# Patient Record
Sex: MALE | Race: BLACK OR AFRICAN AMERICAN | NOT HISPANIC OR LATINO | URBAN - METROPOLITAN AREA
[De-identification: names, ages, dates, MRNs, and addresses within clinical notes are randomized per-mention and may not be internally consistent; named-entity substitution may affect disease eponyms.]

---

## 2022-05-27 PROBLEM — Z00.00 ENCOUNTER FOR PREVENTIVE HEALTH EXAMINATION: Status: ACTIVE | Noted: 2022-05-27

## 2022-10-30 ENCOUNTER — EMERGENCY (EMERGENCY)
Facility: HOSPITAL | Age: 57
LOS: 0 days | Discharge: HOME | End: 2022-10-30
Attending: STUDENT IN AN ORGANIZED HEALTH CARE EDUCATION/TRAINING PROGRAM | Admitting: STUDENT IN AN ORGANIZED HEALTH CARE EDUCATION/TRAINING PROGRAM

## 2022-10-30 VITALS
HEART RATE: 77 BPM | SYSTOLIC BLOOD PRESSURE: 154 MMHG | RESPIRATION RATE: 16 BRPM | OXYGEN SATURATION: 99 % | TEMPERATURE: 98 F | DIASTOLIC BLOOD PRESSURE: 67 MMHG

## 2022-10-30 DIAGNOSIS — H92.02 OTALGIA, LEFT EAR: ICD-10-CM

## 2022-10-30 PROCEDURE — 99282 EMERGENCY DEPT VISIT SF MDM: CPT

## 2022-10-30 NOTE — ED PROVIDER NOTE - OBJECTIVE STATEMENT
58 y/o M p/w gradual onset, dull, non radiating L sided ear pain x 2d. + recent URI x1wk, improving. No hearing loss, trauma, HA. No discharge.

## 2022-10-30 NOTE — ED PROVIDER NOTE - PHYSICAL EXAMINATION
CONSTITUTIONAL: NAD  SKIN: Warm dry  HEAD: NCAT  EYES: NL inspection  ENT: MMM; b/l NL TM, no erythema, effusion or bulging; no pinna ttp; no mastoid ttp  NECK: Supple; non tender.  CARD: RRR  RESP: No resp distress  NEURO: Grossly unremarkable  PSYCH: Cooperative, appropriate.

## 2022-10-30 NOTE — ED PROVIDER NOTE - NS ED ROS FT
Constitutional:  See HPI  Eyes:  No visual changes  ENMT: See HPI; + nasal congestion  Cardiac:  No chest pain  Respiratory:  No cough or respiratory distress.   GI:  No nausea, vomiting, diarrhea or abdominal pain.  :  No dysuria, frequency or burning.  MS:  No back pain.  Neuro:  No headache   Skin:  No skin rash  Except as documented in the HPI,  all other systems are negative

## 2022-10-30 NOTE — ED PROVIDER NOTE - NSFOLLOWUPINSTRUCTIONS_ED_ALL_ED_FT
You have been seen for an earache.  Looks like you have a virus that will resolve on its own.  Safer discharge.  Take over-the-counter decongestants for your symptoms.  Follow-up with your doctor if your symptoms do not improve in 3 to 5 days.  For new or worsening symptoms return to emergency department    An earache, or ear pain, can be caused by many things, including:  •An infection.      •Ear wax buildup.      •Ear pressure.      •Something in the ear that should not be there (foreign body).      •A sore throat.      •Tooth problems.      •Jaw problems.      Treatment of the earache will depend on the cause. If the cause is not clear or cannot be determined, you may need to watch your symptoms until your earache goes away or until a cause is found.      Follow these instructions at home:    Medicines     •Take or apply over-the-counter and prescription medicines only as told by your health care provider.      •If you were prescribed an antibiotic medicine, use it as told by your health care provider. Do not stop using the antibiotic even if you start to feel better.      • Do not put anything in your ear other than medicine that is prescribed by your health care provider.      Managing pain     If directed, apply heat to the affected area as often as told by your health care provider. Use the heat source that your health care provider recommends, such as a moist heat pack or a heating pad.  •Place a towel between your skin and the heat source.      •Leave the heat on for 20–30 minutes.      •Remove the heat if your skin turns bright red. This is especially important if you are unable to feel pain, heat, or cold. You may have a greater risk of getting burned.        If directed, put ice on the affected area as often as told by your health care provider. To do this:              •Put ice in a plastic bag.      •Place a towel between your skin and the bag.      •Leave the ice on for 20 minutes, 2–3 times a day.      General instructions    •Pay attention to any changes in your symptoms.      •Try resting in an upright position instead of lying down. This may help to reduce pressure in your ear and relieve pain.      •Chew gum if it helps to relieve your ear pain.      •Treat any allergies as told by your health care provider.      •Drink enough fluid to keep your urine pale yellow.      •It is up to you to get the results of any tests that were done. Ask your health care provider, or the department that is doing the tests, when your results will be ready.      •Keep all follow-up visits as told by your health care provider. This is important.        Contact a health care provider if:    •Your pain does not improve within 2 days.      •Your earache gets worse.      •You have new symptoms.      •You have a fever.        Get help right away if you:    •Have a severe headache.      •Have a stiff neck.      •Have trouble swallowing.      •Have redness or swelling behind your ear.      •Have fluid or blood coming from your ear.      •Have hearing loss.      •Feel dizzy.        Summary    •An earache, or ear pain, can be caused by many things.      •Treatment of the earache will depend on the cause. Follow recommendations from your health care provider to treat your ear pain.      •If the cause is not clear or cannot be determined, you may need to watch your symptoms until your earache goes away or until a cause is found.      •Keep all follow-up visits as told by your health care provider. This is important.

## 2022-10-30 NOTE — ED ADULT NURSE NOTE - PAIN RATING/NUMBER SCALE (0-10): REST
Hpi Title: Evaluation of Skin Lesions How Severe Are Your Spot(S)?: mild Have Your Spot(S) Been Treated In The Past?: has not been treated Location: Right chest 5

## 2022-10-30 NOTE — ED PROVIDER NOTE - PATIENT PORTAL LINK FT
You can access the FollowMyHealth Patient Portal offered by [facility] by registering at the following website: [email or web address]. By joining [facility]’s FollowMyHealth portal, you will also be able to view your health information using other applications (apps) compatible with our system.

## 2023-12-12 ENCOUNTER — APPOINTMENT (OUTPATIENT)
Dept: NEUROLOGY | Facility: CLINIC | Age: 58
End: 2023-12-12
Payer: COMMERCIAL

## 2023-12-12 ENCOUNTER — OUTPATIENT (OUTPATIENT)
Dept: OUTPATIENT SERVICES | Facility: HOSPITAL | Age: 58
LOS: 1 days | End: 2023-12-12
Payer: COMMERCIAL

## 2023-12-12 VITALS
HEIGHT: 68 IN | OXYGEN SATURATION: 98 % | HEART RATE: 80 BPM | BODY MASS INDEX: 28.04 KG/M2 | WEIGHT: 185 LBS | SYSTOLIC BLOOD PRESSURE: 138 MMHG | DIASTOLIC BLOOD PRESSURE: 86 MMHG

## 2023-12-12 DIAGNOSIS — G44.209 TENSION-TYPE HEADACHE, UNSPECIFIED, NOT INTRACTABLE: ICD-10-CM

## 2023-12-12 DIAGNOSIS — M54.16 RADICULOPATHY, LUMBAR REGION: ICD-10-CM

## 2023-12-12 DIAGNOSIS — Z00.00 ENCOUNTER FOR GENERAL ADULT MEDICAL EXAMINATION WITHOUT ABNORMAL FINDINGS: ICD-10-CM

## 2023-12-12 PROCEDURE — 99203 OFFICE O/P NEW LOW 30 MIN: CPT

## 2023-12-14 DIAGNOSIS — G44.209 TENSION-TYPE HEADACHE, UNSPECIFIED, NOT INTRACTABLE: ICD-10-CM

## 2023-12-14 DIAGNOSIS — M54.16 RADICULOPATHY, LUMBAR REGION: ICD-10-CM

## 2024-07-05 ENCOUNTER — EMERGENCY (EMERGENCY)
Facility: HOSPITAL | Age: 59
LOS: 0 days | Discharge: HOME | End: 2024-07-05
Attending: STUDENT IN AN ORGANIZED HEALTH CARE EDUCATION/TRAINING PROGRAM
Payer: COMMERCIAL

## 2024-07-05 VITALS
DIASTOLIC BLOOD PRESSURE: 84 MMHG | HEART RATE: 73 BPM | SYSTOLIC BLOOD PRESSURE: 127 MMHG | RESPIRATION RATE: 18 BRPM | OXYGEN SATURATION: 99 % | TEMPERATURE: 98 F

## 2024-07-05 VITALS
RESPIRATION RATE: 18 BRPM | HEART RATE: 77 BPM | TEMPERATURE: 98 F | OXYGEN SATURATION: 97 % | DIASTOLIC BLOOD PRESSURE: 90 MMHG | WEIGHT: 167.99 LBS | SYSTOLIC BLOOD PRESSURE: 132 MMHG | HEIGHT: 68 IN

## 2024-07-05 DIAGNOSIS — I10 ESSENTIAL (PRIMARY) HYPERTENSION: ICD-10-CM

## 2024-07-05 DIAGNOSIS — R20.2 PARESTHESIA OF SKIN: ICD-10-CM

## 2024-07-05 DIAGNOSIS — E78.5 HYPERLIPIDEMIA, UNSPECIFIED: ICD-10-CM

## 2024-07-05 DIAGNOSIS — R07.0 PAIN IN THROAT: ICD-10-CM

## 2024-07-05 DIAGNOSIS — M79.89 OTHER SPECIFIED SOFT TISSUE DISORDERS: ICD-10-CM

## 2024-07-05 DIAGNOSIS — I63.81 OTHER CEREBRAL INFARCTION DUE TO OCCLUSION OR STENOSIS OF SMALL ARTERY: ICD-10-CM

## 2024-07-05 LAB
A1C WITH ESTIMATED AVERAGE GLUCOSE RESULT: 5.6 % — SIGNIFICANT CHANGE UP (ref 4–5.6)
ALBUMIN SERPL ELPH-MCNC: 4.6 G/DL — SIGNIFICANT CHANGE UP (ref 3.5–5.2)
ALP SERPL-CCNC: 64 U/L — SIGNIFICANT CHANGE UP (ref 30–115)
ALT FLD-CCNC: 19 U/L — SIGNIFICANT CHANGE UP (ref 0–41)
ANION GAP SERPL CALC-SCNC: 13 MMOL/L — SIGNIFICANT CHANGE UP (ref 7–14)
AST SERPL-CCNC: 26 U/L — SIGNIFICANT CHANGE UP (ref 0–41)
BASOPHILS # BLD AUTO: 0.02 K/UL — SIGNIFICANT CHANGE UP (ref 0–0.2)
BASOPHILS NFR BLD AUTO: 0.4 % — SIGNIFICANT CHANGE UP (ref 0–1)
BILIRUB SERPL-MCNC: 0.6 MG/DL — SIGNIFICANT CHANGE UP (ref 0.2–1.2)
BUN SERPL-MCNC: 14 MG/DL — SIGNIFICANT CHANGE UP (ref 10–20)
CALCIUM SERPL-MCNC: 9.7 MG/DL — SIGNIFICANT CHANGE UP (ref 8.4–10.5)
CHLORIDE SERPL-SCNC: 99 MMOL/L — SIGNIFICANT CHANGE UP (ref 98–110)
CHOLEST SERPL-MCNC: 126 MG/DL — SIGNIFICANT CHANGE UP
CO2 SERPL-SCNC: 25 MMOL/L — SIGNIFICANT CHANGE UP (ref 17–32)
CREAT SERPL-MCNC: 1.2 MG/DL — SIGNIFICANT CHANGE UP (ref 0.7–1.5)
EGFR: 69 ML/MIN/1.73M2 — SIGNIFICANT CHANGE UP
EOSINOPHIL # BLD AUTO: 0.13 K/UL — SIGNIFICANT CHANGE UP (ref 0–0.7)
EOSINOPHIL NFR BLD AUTO: 2.4 % — SIGNIFICANT CHANGE UP (ref 0–8)
ESTIMATED AVERAGE GLUCOSE: 114 MG/DL — SIGNIFICANT CHANGE UP (ref 68–114)
GLUCOSE SERPL-MCNC: 93 MG/DL — SIGNIFICANT CHANGE UP (ref 70–99)
HCT VFR BLD CALC: 43.2 % — SIGNIFICANT CHANGE UP (ref 42–52)
HDLC SERPL-MCNC: 52 MG/DL — SIGNIFICANT CHANGE UP
HGB BLD-MCNC: 14.8 G/DL — SIGNIFICANT CHANGE UP (ref 14–18)
IMM GRANULOCYTES NFR BLD AUTO: 0.2 % — SIGNIFICANT CHANGE UP (ref 0.1–0.3)
LIPID PNL WITH DIRECT LDL SERPL: 58 MG/DL — SIGNIFICANT CHANGE UP
LYMPHOCYTES # BLD AUTO: 1.28 K/UL — SIGNIFICANT CHANGE UP (ref 1.2–3.4)
LYMPHOCYTES # BLD AUTO: 23.7 % — SIGNIFICANT CHANGE UP (ref 20.5–51.1)
MCHC RBC-ENTMCNC: 33.7 PG — HIGH (ref 27–31)
MCHC RBC-ENTMCNC: 34.3 G/DL — SIGNIFICANT CHANGE UP (ref 32–37)
MCV RBC AUTO: 98.4 FL — HIGH (ref 80–94)
MONOCYTES # BLD AUTO: 0.33 K/UL — SIGNIFICANT CHANGE UP (ref 0.1–0.6)
MONOCYTES NFR BLD AUTO: 6.1 % — SIGNIFICANT CHANGE UP (ref 1.7–9.3)
NEUTROPHILS # BLD AUTO: 3.62 K/UL — SIGNIFICANT CHANGE UP (ref 1.4–6.5)
NEUTROPHILS NFR BLD AUTO: 67.2 % — SIGNIFICANT CHANGE UP (ref 42.2–75.2)
NON HDL CHOLESTEROL: 74 MG/DL — SIGNIFICANT CHANGE UP
NRBC # BLD: 0 /100 WBCS — SIGNIFICANT CHANGE UP (ref 0–0)
PLATELET # BLD AUTO: 301 K/UL — SIGNIFICANT CHANGE UP (ref 130–400)
PMV BLD: 9.3 FL — SIGNIFICANT CHANGE UP (ref 7.4–10.4)
POTASSIUM SERPL-MCNC: 5.5 MMOL/L — HIGH (ref 3.5–5)
POTASSIUM SERPL-SCNC: 5.5 MMOL/L — HIGH (ref 3.5–5)
PROT SERPL-MCNC: 7.2 G/DL — SIGNIFICANT CHANGE UP (ref 6–8)
RBC # BLD: 4.39 M/UL — LOW (ref 4.7–6.1)
RBC # FLD: 12.9 % — SIGNIFICANT CHANGE UP (ref 11.5–14.5)
SODIUM SERPL-SCNC: 137 MMOL/L — SIGNIFICANT CHANGE UP (ref 135–146)
TRIGL SERPL-MCNC: 77 MG/DL — SIGNIFICANT CHANGE UP
TSH SERPL-MCNC: 1.02 UIU/ML — SIGNIFICANT CHANGE UP (ref 0.27–4.2)
WBC # BLD: 5.39 K/UL — SIGNIFICANT CHANGE UP (ref 4.8–10.8)
WBC # FLD AUTO: 5.39 K/UL — SIGNIFICANT CHANGE UP (ref 4.8–10.8)

## 2024-07-05 PROCEDURE — 70498 CT ANGIOGRAPHY NECK: CPT | Mod: MC

## 2024-07-05 PROCEDURE — 70498 CT ANGIOGRAPHY NECK: CPT | Mod: 26,MC

## 2024-07-05 PROCEDURE — 36415 COLL VENOUS BLD VENIPUNCTURE: CPT

## 2024-07-05 PROCEDURE — 83036 HEMOGLOBIN GLYCOSYLATED A1C: CPT

## 2024-07-05 PROCEDURE — 70496 CT ANGIOGRAPHY HEAD: CPT | Mod: MC

## 2024-07-05 PROCEDURE — 84443 ASSAY THYROID STIM HORMONE: CPT

## 2024-07-05 PROCEDURE — 80061 LIPID PANEL: CPT

## 2024-07-05 PROCEDURE — 70551 MRI BRAIN STEM W/O DYE: CPT | Mod: MC

## 2024-07-05 PROCEDURE — 70450 CT HEAD/BRAIN W/O DYE: CPT | Mod: MC

## 2024-07-05 PROCEDURE — G0378: CPT

## 2024-07-05 PROCEDURE — 93010 ELECTROCARDIOGRAM REPORT: CPT

## 2024-07-05 PROCEDURE — 70551 MRI BRAIN STEM W/O DYE: CPT | Mod: 26,MC

## 2024-07-05 PROCEDURE — 70491 CT SOFT TISSUE NECK W/DYE: CPT | Mod: MC

## 2024-07-05 PROCEDURE — 70450 CT HEAD/BRAIN W/O DYE: CPT | Mod: 26,MC

## 2024-07-05 PROCEDURE — 99223 1ST HOSP IP/OBS HIGH 75: CPT

## 2024-07-05 PROCEDURE — 85025 COMPLETE CBC W/AUTO DIFF WBC: CPT

## 2024-07-05 PROCEDURE — 99284 EMERGENCY DEPT VISIT MOD MDM: CPT

## 2024-07-05 PROCEDURE — 70491 CT SOFT TISSUE NECK W/DYE: CPT | Mod: 26,MC

## 2024-07-05 PROCEDURE — 70496 CT ANGIOGRAPHY HEAD: CPT | Mod: 26,MC

## 2024-07-05 PROCEDURE — 93005 ELECTROCARDIOGRAM TRACING: CPT

## 2024-07-05 PROCEDURE — 80053 COMPREHEN METABOLIC PANEL: CPT

## 2024-07-05 RX ORDER — ATORVASTATIN CALCIUM 20 MG/1
80 TABLET, FILM COATED ORAL ONCE
Refills: 0 | Status: COMPLETED | OUTPATIENT
Start: 2024-07-05 | End: 2024-07-05

## 2024-07-05 RX ORDER — ASPIRIN 325 MG/1
1 TABLET, FILM COATED ORAL
Qty: 30 | Refills: 0
Start: 2024-07-05 | End: 2024-08-03

## 2024-07-05 RX ORDER — ASPIRIN 325 MG/1
81 TABLET, FILM COATED ORAL ONCE
Refills: 0 | Status: COMPLETED | OUTPATIENT
Start: 2024-07-05 | End: 2024-07-05

## 2024-07-05 RX ADMIN — ATORVASTATIN CALCIUM 80 MILLIGRAM(S): 20 TABLET, FILM COATED ORAL at 14:29

## 2024-07-05 RX ADMIN — ASPIRIN 81 MILLIGRAM(S): 325 TABLET, FILM COATED ORAL at 14:29

## 2024-07-13 ENCOUNTER — EMERGENCY (EMERGENCY)
Facility: HOSPITAL | Age: 59
LOS: 0 days | Discharge: HOME | End: 2024-07-13
Attending: STUDENT IN AN ORGANIZED HEALTH CARE EDUCATION/TRAINING PROGRAM
Payer: COMMERCIAL

## 2024-07-13 VITALS
OXYGEN SATURATION: 98 % | TEMPERATURE: 98 F | HEIGHT: 68 IN | WEIGHT: 167.99 LBS | SYSTOLIC BLOOD PRESSURE: 118 MMHG | DIASTOLIC BLOOD PRESSURE: 78 MMHG | RESPIRATION RATE: 18 BRPM | HEART RATE: 88 BPM

## 2024-07-13 DIAGNOSIS — Z91.148 PATIENT'S OTHER NONCOMPLIANCE WITH MEDICATION REGIMEN FOR OTHER REASON: ICD-10-CM

## 2024-07-13 DIAGNOSIS — I10 ESSENTIAL (PRIMARY) HYPERTENSION: ICD-10-CM

## 2024-07-13 DIAGNOSIS — E78.5 HYPERLIPIDEMIA, UNSPECIFIED: ICD-10-CM

## 2024-07-13 DIAGNOSIS — T50.906A UNDERDOSING OF UNSPECIFIED DRUGS, MEDICAMENTS AND BIOLOGICAL SUBSTANCES, INITIAL ENCOUNTER: ICD-10-CM

## 2024-07-13 DIAGNOSIS — F41.9 ANXIETY DISORDER, UNSPECIFIED: ICD-10-CM

## 2024-07-13 PROCEDURE — 99283 EMERGENCY DEPT VISIT LOW MDM: CPT

## 2024-07-15 ENCOUNTER — EMERGENCY (EMERGENCY)
Facility: HOSPITAL | Age: 59
LOS: 0 days | Discharge: HOME | End: 2024-07-16
Attending: EMERGENCY MEDICINE
Payer: COMMERCIAL

## 2024-07-15 VITALS
HEART RATE: 73 BPM | TEMPERATURE: 98 F | OXYGEN SATURATION: 98 % | DIASTOLIC BLOOD PRESSURE: 91 MMHG | RESPIRATION RATE: 18 BRPM | SYSTOLIC BLOOD PRESSURE: 153 MMHG

## 2024-07-15 VITALS
HEART RATE: 103 BPM | WEIGHT: 160.06 LBS | HEIGHT: 68 IN | OXYGEN SATURATION: 95 % | SYSTOLIC BLOOD PRESSURE: 122 MMHG | DIASTOLIC BLOOD PRESSURE: 76 MMHG | TEMPERATURE: 99 F | RESPIRATION RATE: 18 BRPM

## 2024-07-15 DIAGNOSIS — F41.9 ANXIETY DISORDER, UNSPECIFIED: ICD-10-CM

## 2024-07-15 DIAGNOSIS — E78.5 HYPERLIPIDEMIA, UNSPECIFIED: ICD-10-CM

## 2024-07-15 DIAGNOSIS — E03.9 HYPOTHYROIDISM, UNSPECIFIED: ICD-10-CM

## 2024-07-15 DIAGNOSIS — Z20.822 CONTACT WITH AND (SUSPECTED) EXPOSURE TO COVID-19: ICD-10-CM

## 2024-07-15 DIAGNOSIS — F41.0 PANIC DISORDER [EPISODIC PAROXYSMAL ANXIETY]: ICD-10-CM

## 2024-07-15 DIAGNOSIS — I10 ESSENTIAL (PRIMARY) HYPERTENSION: ICD-10-CM

## 2024-07-15 LAB
ANION GAP SERPL CALC-SCNC: 13 MMOL/L — SIGNIFICANT CHANGE UP (ref 7–14)
APAP SERPL-MCNC: <5 UG/ML — LOW (ref 10–30)
APPEARANCE UR: CLEAR — SIGNIFICANT CHANGE UP
BASOPHILS # BLD AUTO: 0.02 K/UL — SIGNIFICANT CHANGE UP (ref 0–0.2)
BASOPHILS NFR BLD AUTO: 0.4 % — SIGNIFICANT CHANGE UP (ref 0–1)
BILIRUB UR-MCNC: NEGATIVE — SIGNIFICANT CHANGE UP
BUN SERPL-MCNC: 12 MG/DL — SIGNIFICANT CHANGE UP (ref 10–20)
CALCIUM SERPL-MCNC: 10.2 MG/DL — SIGNIFICANT CHANGE UP (ref 8.4–10.5)
CHLORIDE SERPL-SCNC: 98 MMOL/L — SIGNIFICANT CHANGE UP (ref 98–110)
CO2 SERPL-SCNC: 27 MMOL/L — SIGNIFICANT CHANGE UP (ref 17–32)
COLOR SPEC: YELLOW — SIGNIFICANT CHANGE UP
CREAT SERPL-MCNC: 1.2 MG/DL — SIGNIFICANT CHANGE UP (ref 0.7–1.5)
DIFF PNL FLD: NEGATIVE — SIGNIFICANT CHANGE UP
EGFR: 69 ML/MIN/1.73M2 — SIGNIFICANT CHANGE UP
EOSINOPHIL # BLD AUTO: 0.03 K/UL — SIGNIFICANT CHANGE UP (ref 0–0.7)
EOSINOPHIL NFR BLD AUTO: 0.5 % — SIGNIFICANT CHANGE UP (ref 0–8)
ETHANOL SERPL-MCNC: <10 MG/DL — SIGNIFICANT CHANGE UP
FLUAV AG NPH QL: SIGNIFICANT CHANGE UP
FLUBV AG NPH QL: SIGNIFICANT CHANGE UP
GLUCOSE SERPL-MCNC: 74 MG/DL — SIGNIFICANT CHANGE UP (ref 70–99)
GLUCOSE UR QL: NEGATIVE MG/DL — SIGNIFICANT CHANGE UP
HCT VFR BLD CALC: 43.2 % — SIGNIFICANT CHANGE UP (ref 42–52)
HGB BLD-MCNC: 14.9 G/DL — SIGNIFICANT CHANGE UP (ref 14–18)
IMM GRANULOCYTES NFR BLD AUTO: 0.4 % — HIGH (ref 0.1–0.3)
KETONES UR-MCNC: 15 MG/DL
LEUKOCYTE ESTERASE UR-ACNC: NEGATIVE — SIGNIFICANT CHANGE UP
LYMPHOCYTES # BLD AUTO: 1.03 K/UL — LOW (ref 1.2–3.4)
LYMPHOCYTES # BLD AUTO: 18.6 % — LOW (ref 20.5–51.1)
MCHC RBC-ENTMCNC: 33.7 PG — HIGH (ref 27–31)
MCHC RBC-ENTMCNC: 34.5 G/DL — SIGNIFICANT CHANGE UP (ref 32–37)
MCV RBC AUTO: 97.7 FL — HIGH (ref 80–94)
MONOCYTES # BLD AUTO: 0.38 K/UL — SIGNIFICANT CHANGE UP (ref 0.1–0.6)
MONOCYTES NFR BLD AUTO: 6.8 % — SIGNIFICANT CHANGE UP (ref 1.7–9.3)
NEUTROPHILS # BLD AUTO: 4.07 K/UL — SIGNIFICANT CHANGE UP (ref 1.4–6.5)
NEUTROPHILS NFR BLD AUTO: 73.3 % — SIGNIFICANT CHANGE UP (ref 42.2–75.2)
NITRITE UR-MCNC: NEGATIVE — SIGNIFICANT CHANGE UP
NRBC # BLD: 0 /100 WBCS — SIGNIFICANT CHANGE UP (ref 0–0)
PH UR: 6 — SIGNIFICANT CHANGE UP (ref 5–8)
PLATELET # BLD AUTO: 303 K/UL — SIGNIFICANT CHANGE UP (ref 130–400)
PMV BLD: 8.9 FL — SIGNIFICANT CHANGE UP (ref 7.4–10.4)
POTASSIUM SERPL-MCNC: 4.9 MMOL/L — SIGNIFICANT CHANGE UP (ref 3.5–5)
POTASSIUM SERPL-SCNC: 4.9 MMOL/L — SIGNIFICANT CHANGE UP (ref 3.5–5)
PROT UR-MCNC: SIGNIFICANT CHANGE UP MG/DL
RBC # BLD: 4.42 M/UL — LOW (ref 4.7–6.1)
RBC # FLD: 13.1 % — SIGNIFICANT CHANGE UP (ref 11.5–14.5)
RSV RNA NPH QL NAA+NON-PROBE: SIGNIFICANT CHANGE UP
SALICYLATES SERPL-MCNC: <0.3 MG/DL — LOW (ref 4–30)
SARS-COV-2 RNA SPEC QL NAA+PROBE: SIGNIFICANT CHANGE UP
SODIUM SERPL-SCNC: 138 MMOL/L — SIGNIFICANT CHANGE UP (ref 135–146)
SP GR SPEC: 1.02 — SIGNIFICANT CHANGE UP (ref 1–1.03)
UROBILINOGEN FLD QL: 1 MG/DL — SIGNIFICANT CHANGE UP (ref 0.2–1)
WBC # BLD: 5.55 K/UL — SIGNIFICANT CHANGE UP (ref 4.8–10.8)
WBC # FLD AUTO: 5.55 K/UL — SIGNIFICANT CHANGE UP (ref 4.8–10.8)

## 2024-07-15 PROCEDURE — 36415 COLL VENOUS BLD VENIPUNCTURE: CPT

## 2024-07-15 PROCEDURE — 0241U: CPT

## 2024-07-15 PROCEDURE — 85025 COMPLETE CBC W/AUTO DIFF WBC: CPT

## 2024-07-15 PROCEDURE — 80048 BASIC METABOLIC PNL TOTAL CA: CPT

## 2024-07-15 PROCEDURE — 81003 URINALYSIS AUTO W/O SCOPE: CPT

## 2024-07-15 PROCEDURE — 90792 PSYCH DIAG EVAL W/MED SRVCS: CPT | Mod: 95

## 2024-07-15 PROCEDURE — 99285 EMERGENCY DEPT VISIT HI MDM: CPT

## 2024-07-15 PROCEDURE — 93005 ELECTROCARDIOGRAM TRACING: CPT

## 2024-07-15 PROCEDURE — 93010 ELECTROCARDIOGRAM REPORT: CPT

## 2024-07-15 PROCEDURE — 99285 EMERGENCY DEPT VISIT HI MDM: CPT | Mod: 25

## 2024-07-15 PROCEDURE — 80307 DRUG TEST PRSMV CHEM ANLYZR: CPT

## 2024-07-18 ENCOUNTER — OUTPATIENT (OUTPATIENT)
Dept: OUTPATIENT SERVICES | Facility: HOSPITAL | Age: 59
LOS: 1 days | Discharge: STILL ADMITTED OR ADMINISTRATIVE | End: 2024-07-18
Payer: COMMERCIAL

## 2024-07-18 PROCEDURE — 90833 PSYTX W PT W E/M 30 MIN: CPT

## 2024-07-18 PROCEDURE — 99204 OFFICE O/P NEW MOD 45 MIN: CPT

## 2024-07-22 DIAGNOSIS — F43.23 ADJUSTMENT DISORDER WITH MIXED ANXIETY AND DEPRESSED MOOD: ICD-10-CM

## 2024-07-24 ENCOUNTER — INPATIENT (INPATIENT)
Facility: HOSPITAL | Age: 59
LOS: 8 days | Discharge: PSYCHIATRIC OR SUBSTANCE USE FACILITY | DRG: 72 | End: 2024-08-02
Attending: HOSPITALIST | Admitting: INTERNAL MEDICINE
Payer: COMMERCIAL

## 2024-07-24 VITALS
OXYGEN SATURATION: 100 % | HEIGHT: 68 IN | SYSTOLIC BLOOD PRESSURE: 141 MMHG | RESPIRATION RATE: 18 BRPM | HEART RATE: 80 BPM | TEMPERATURE: 98 F | DIASTOLIC BLOOD PRESSURE: 90 MMHG

## 2024-07-24 PROCEDURE — 99285 EMERGENCY DEPT VISIT HI MDM: CPT

## 2024-07-24 NOTE — ED ADULT TRIAGE NOTE - CHIEF COMPLAINT QUOTE
Pt BIBA from for seizure like activity today ~9p now seems "off" and lethargic  same thing happened to pt last week per family

## 2024-07-24 NOTE — ED ADULT NURSE NOTE - NSFALLHARMRISKINTERV_ED_ALL_ED

## 2024-07-25 DIAGNOSIS — G93.49 OTHER ENCEPHALOPATHY: ICD-10-CM

## 2024-07-25 LAB
ALBUMIN SERPL ELPH-MCNC: 4.6 G/DL — SIGNIFICANT CHANGE UP (ref 3.5–5.2)
ALP SERPL-CCNC: 58 U/L — SIGNIFICANT CHANGE UP (ref 30–115)
ALT FLD-CCNC: 43 U/L — HIGH (ref 0–41)
ANION GAP SERPL CALC-SCNC: 12 MMOL/L — SIGNIFICANT CHANGE UP (ref 7–14)
APTT BLD: 29.9 SEC — SIGNIFICANT CHANGE UP (ref 27–39.2)
AST SERPL-CCNC: 119 U/L — HIGH (ref 0–41)
BASOPHILS # BLD AUTO: 0.02 K/UL — SIGNIFICANT CHANGE UP (ref 0–0.2)
BASOPHILS NFR BLD AUTO: 0.3 % — SIGNIFICANT CHANGE UP (ref 0–1)
BILIRUB SERPL-MCNC: 0.8 MG/DL — SIGNIFICANT CHANGE UP (ref 0.2–1.2)
BUN SERPL-MCNC: 32 MG/DL — HIGH (ref 10–20)
CALCIUM SERPL-MCNC: 9.5 MG/DL — SIGNIFICANT CHANGE UP (ref 8.4–10.5)
CHLORIDE SERPL-SCNC: 98 MMOL/L — SIGNIFICANT CHANGE UP (ref 98–110)
CO2 SERPL-SCNC: 25 MMOL/L — SIGNIFICANT CHANGE UP (ref 17–32)
CREAT SERPL-MCNC: 1.6 MG/DL — HIGH (ref 0.7–1.5)
EGFR: 49 ML/MIN/1.73M2 — LOW
EOSINOPHIL # BLD AUTO: 0.14 K/UL — SIGNIFICANT CHANGE UP (ref 0–0.7)
EOSINOPHIL NFR BLD AUTO: 2 % — SIGNIFICANT CHANGE UP (ref 0–8)
GLUCOSE SERPL-MCNC: 88 MG/DL — SIGNIFICANT CHANGE UP (ref 70–99)
HCT VFR BLD CALC: 42.2 % — SIGNIFICANT CHANGE UP (ref 42–52)
HGB BLD-MCNC: 14.3 G/DL — SIGNIFICANT CHANGE UP (ref 14–18)
IMM GRANULOCYTES NFR BLD AUTO: 0.1 % — SIGNIFICANT CHANGE UP (ref 0.1–0.3)
INR BLD: 1.01 RATIO — SIGNIFICANT CHANGE UP (ref 0.65–1.3)
LYMPHOCYTES # BLD AUTO: 1.35 K/UL — SIGNIFICANT CHANGE UP (ref 1.2–3.4)
LYMPHOCYTES # BLD AUTO: 18.9 % — LOW (ref 20.5–51.1)
MCHC RBC-ENTMCNC: 33.6 PG — HIGH (ref 27–31)
MCHC RBC-ENTMCNC: 33.9 G/DL — SIGNIFICANT CHANGE UP (ref 32–37)
MCV RBC AUTO: 99.3 FL — HIGH (ref 80–94)
MONOCYTES # BLD AUTO: 0.61 K/UL — HIGH (ref 0.1–0.6)
MONOCYTES NFR BLD AUTO: 8.5 % — SIGNIFICANT CHANGE UP (ref 1.7–9.3)
NEUTROPHILS # BLD AUTO: 5.02 K/UL — SIGNIFICANT CHANGE UP (ref 1.4–6.5)
NEUTROPHILS NFR BLD AUTO: 70.2 % — SIGNIFICANT CHANGE UP (ref 42.2–75.2)
NRBC # BLD: 0 /100 WBCS — SIGNIFICANT CHANGE UP (ref 0–0)
PLATELET # BLD AUTO: 226 K/UL — SIGNIFICANT CHANGE UP (ref 130–400)
PMV BLD: 8.9 FL — SIGNIFICANT CHANGE UP (ref 7.4–10.4)
POTASSIUM SERPL-MCNC: 5 MMOL/L — SIGNIFICANT CHANGE UP (ref 3.5–5)
POTASSIUM SERPL-SCNC: 5 MMOL/L — SIGNIFICANT CHANGE UP (ref 3.5–5)
PROT SERPL-MCNC: 7.2 G/DL — SIGNIFICANT CHANGE UP (ref 6–8)
PROTHROM AB SERPL-ACNC: 11.5 SEC — SIGNIFICANT CHANGE UP (ref 9.95–12.87)
RBC # BLD: 4.25 M/UL — LOW (ref 4.7–6.1)
RBC # FLD: 13.5 % — SIGNIFICANT CHANGE UP (ref 11.5–14.5)
SODIUM SERPL-SCNC: 135 MMOL/L — SIGNIFICANT CHANGE UP (ref 135–146)
TROPONIN T, HIGH SENSITIVITY RESULT: 28 NG/L — HIGH (ref 6–21)
WBC # BLD: 7.15 K/UL — SIGNIFICANT CHANGE UP (ref 4.8–10.8)
WBC # FLD AUTO: 7.15 K/UL — SIGNIFICANT CHANGE UP (ref 4.8–10.8)

## 2024-07-25 PROCEDURE — 82140 ASSAY OF AMMONIA: CPT

## 2024-07-25 PROCEDURE — 80307 DRUG TEST PRSMV CHEM ANLYZR: CPT

## 2024-07-25 PROCEDURE — 82746 ASSAY OF FOLIC ACID SERUM: CPT

## 2024-07-25 PROCEDURE — 84484 ASSAY OF TROPONIN QUANT: CPT

## 2024-07-25 PROCEDURE — 0042T: CPT | Mod: MC

## 2024-07-25 PROCEDURE — 99223 1ST HOSP IP/OBS HIGH 75: CPT

## 2024-07-25 PROCEDURE — A9579: CPT

## 2024-07-25 PROCEDURE — 70450 CT HEAD/BRAIN W/O DYE: CPT | Mod: 26,MC

## 2024-07-25 PROCEDURE — 71045 X-RAY EXAM CHEST 1 VIEW: CPT | Mod: 26

## 2024-07-25 PROCEDURE — 70498 CT ANGIOGRAPHY NECK: CPT | Mod: 26,MC

## 2024-07-25 PROCEDURE — 36415 COLL VENOUS BLD VENIPUNCTURE: CPT

## 2024-07-25 PROCEDURE — 80354 DRUG SCREENING FENTANYL: CPT

## 2024-07-25 PROCEDURE — 85027 COMPLETE CBC AUTOMATED: CPT

## 2024-07-25 PROCEDURE — 70552 MRI BRAIN STEM W/DYE: CPT | Mod: MC

## 2024-07-25 PROCEDURE — 70551 MRI BRAIN STEM W/O DYE: CPT | Mod: 26,MC

## 2024-07-25 PROCEDURE — 82607 VITAMIN B-12: CPT

## 2024-07-25 PROCEDURE — 76705 ECHO EXAM OF ABDOMEN: CPT

## 2024-07-25 PROCEDURE — 85025 COMPLETE CBC W/AUTO DIFF WBC: CPT

## 2024-07-25 PROCEDURE — 87040 BLOOD CULTURE FOR BACTERIA: CPT

## 2024-07-25 PROCEDURE — 86780 TREPONEMA PALLIDUM: CPT

## 2024-07-25 PROCEDURE — 87389 HIV-1 AG W/HIV-1&-2 AB AG IA: CPT

## 2024-07-25 PROCEDURE — 95819 EEG AWAKE AND ASLEEP: CPT

## 2024-07-25 PROCEDURE — 0241U: CPT

## 2024-07-25 PROCEDURE — 80053 COMPREHEN METABOLIC PANEL: CPT

## 2024-07-25 PROCEDURE — 84443 ASSAY THYROID STIM HORMONE: CPT

## 2024-07-25 PROCEDURE — 80048 BASIC METABOLIC PNL TOTAL CA: CPT

## 2024-07-25 PROCEDURE — 81003 URINALYSIS AUTO W/O SCOPE: CPT

## 2024-07-25 PROCEDURE — 83735 ASSAY OF MAGNESIUM: CPT

## 2024-07-25 PROCEDURE — 70496 CT ANGIOGRAPHY HEAD: CPT | Mod: 26,MC

## 2024-07-25 RX ORDER — HEPARIN SODIUM 1000 [USP'U]/ML
5000 INJECTION, SOLUTION INTRAVENOUS; SUBCUTANEOUS EVERY 8 HOURS
Refills: 0 | Status: DISCONTINUED | OUTPATIENT
Start: 2024-07-25 | End: 2024-08-01

## 2024-07-25 RX ORDER — ASPIRIN 500 MG
81 TABLET ORAL DAILY
Refills: 0 | Status: DISCONTINUED | OUTPATIENT
Start: 2024-07-25 | End: 2024-08-02

## 2024-07-25 RX ORDER — BACTERIOSTATIC SODIUM CHLORIDE 0.9 %
1000 VIAL (ML) INJECTION
Refills: 0 | Status: DISCONTINUED | OUTPATIENT
Start: 2024-07-25 | End: 2024-07-30

## 2024-07-25 RX ORDER — ATORVASTATIN CALCIUM 40 MG/1
10 TABLET, FILM COATED ORAL AT BEDTIME
Refills: 0 | Status: DISCONTINUED | OUTPATIENT
Start: 2024-07-25 | End: 2024-08-02

## 2024-07-25 RX ORDER — AMLODIPINE BESYLATE 2.5 MG/1
5 TABLET ORAL DAILY
Refills: 0 | Status: DISCONTINUED | OUTPATIENT
Start: 2024-07-25 | End: 2024-07-29

## 2024-07-25 RX ADMIN — HEPARIN SODIUM 5000 UNIT(S): 1000 INJECTION, SOLUTION INTRAVENOUS; SUBCUTANEOUS at 21:06

## 2024-07-25 RX ADMIN — ATORVASTATIN CALCIUM 10 MILLIGRAM(S): 40 TABLET, FILM COATED ORAL at 21:06

## 2024-07-25 NOTE — ED CDU PROVIDER INITIAL DAY NOTE - CLINICAL SUMMARY MEDICAL DECISION MAKING FREE TEXT BOX
Patient presented with seizure-like symptoms.  On exam was found to have left facial droop which wife confirms dyspnea and stroke was activated.  Patient was evaluated by the stroke team who stated that his symptoms are resolved, NIHSS score is 0.  They recommended placement in ED OU for MRI of the brain and EEG and reassess

## 2024-07-25 NOTE — ED CDU PROVIDER DISPOSITION NOTE - TIME SPENT DISCHARGE SVCS
[facility] Asc Procedure Text (A): After obtaining clear surgical margins the patient was sent to an ASC for surgical repair.  The patient understands they will receive post-surgical care and follow-up from the ASC physician.

## 2024-07-25 NOTE — ED PROVIDER NOTE - ATTENDING CONTRIBUTION TO CARE
59-year-old male with history of depression currently not being treated, HTN, recently found to have a chronic lacunar infarct 3 weeks ago, presents for evaluation of neurologic symptoms.  Patient's states that about 3 hours ago while they were sitting and watching TV, patient started having generalized tremors.  Patient did not lose tone, was trying to talk however unable to get any words out.  Wife states that this incident lasted for about 10 minutes and self resolved.  Also report the patient has since had left-sided facial droop.  Patient denies any dizziness, headache, nausea, vomiting, extremity weakness or numbness.  VSS, non toxic appearing, NAD, Head NCAT, MMM, neck supple, normal ROM, normal s1s2, lungs ctab, abd s/nt/nd, no guarding or rebound, extremities wnl, AAO x 3, GCS 15, AOx3, CN II to XII within normal limits, except for subtle left-sided facial nerve palsy with subsequent droop of the nasoabial fold, sensation intact throughout extremities, normal motor strength in all extremities, no pronator drift present.  Patient affect is very flat during evaluation. No acute skin lesions. Plan is stroke code activated due to facial droop, FS 82, head imaging, elevation by the stroke team and reassess.

## 2024-07-25 NOTE — ED PROVIDER NOTE - PHYSICAL EXAMINATION
VITAL SIGNS: I have reviewed nursing notes and confirm.  CONSTITUTIONAL: Well-developed; well-nourished; in no acute distress.  SKIN: Skin exam is warm and dry, no acute rash.  HEAD: Normocephalic; atraumatic.  EYES: PERRL, EOM intact; conjunctiva and sclera clear.  CARD: S1, S2 normal; no murmurs, gallops, or rubs. Regular rate and rhythm.  RESP: Normal respiratory effort, no tachypnea or distress. Lungs CTAB, no wheezes, rales or rhonchi.  ABD: soft, NT/ND.  EXT: Normal ROM. No clubbing, cyanosis or edema.  NEURO: Alert, oriented see below for NIH   PSYCH: Cooperative, flat affect, appears anxious    NIH Stroke Scale  1A: Level of consciousness  Alert; keenly responsive 0    1B: Ask month and age  Both questions right 0    1C: 'Blink eyes' & 'squeeze hands'  Performs both tasks 0    2: Horizontal extraocular movements  Normal 0    3: Visual fields  No visual loss 0    4: Facial palsy  Minor paralysis (flat nasolabial fold, smile asymmetry) +1    5A: Left arm motor drift  No drift for 10 seconds 0    5B: Right arm motor drift  No drift for 10 seconds 0    6A: Left leg motor drift  No drift for 5 seconds 0    6B: Right leg motor drift  No drift for 5 seconds 0    7: Limb Ataxia  No ataxia 0    8: Sensation  Normal; no sensory loss 0    9: Language/aphasia  Normal; no aphasia 0    10: Dysarthria  Normal 0    11: Extinction/inattention  No abnormality 0

## 2024-07-25 NOTE — ED PROVIDER NOTE - OBJECTIVE STATEMENT
59-year-old male with a past medical history of hypertension hyperlipidemia, CVA–diagnosed on 7/5, presents to the ED after an episode of aphasia, trembling as described by wife, followed by a period of AMS characterized by not responding to verbal stimuli.  Patient reports that during episode, he remembers periods of trying to speak but does not remember not responding to verbal stimuli.  Denies bowel or bladder incontinence or biting of tongue.  Patient currently AAO x 4 and responding appropriately but states he is anxious.  Additionally, reports an episode of sharp chest pain at 11 AM that lasted "a while" this morning not associated with shortness of breath, N/V.  Currently, no HA/dizziness/CP/SOB/N/V/D.

## 2024-07-25 NOTE — H&P ADULT - ATTENDING COMMENTS
59 year old male with PMH of HTN and recent chronic lacunar infarct with no residual deficits diagnosed in july24 coming to the hospital for momentary episodes of trembling and shaking associated with aphasia and impaired mobility. History complicated by multiple stressors and patient  is currently depressed with active suicidal thoughts but no active plan. Patient was a stroke code in the ED but negative for stroke, neuro consulted and Patient admitted for further work up.      Agree  with assessment  except for changes below.   Vital Signs Last 24 Hrs  T(C): 36.5 (25 Jul 2024 15:40), Max: 36.8 (24 Jul 2024 23:33)  T(F): 97.7 (25 Jul 2024 15:40), Max: 98.2 (24 Jul 2024 23:33)  HR: 84 (25 Jul 2024 15:40) (68 - 84)  BP: 130/84 (25 Jul 2024 15:40) (114/75 - 141/90)  BP(mean): --  RR: 18 (25 Jul 2024 15:40) (18 - 18)  SpO2: 96% (25 Jul 2024 15:40) (96% - 100%)    Parameters below as of 25 Jul 2024 15:40  Patient On (Oxygen Delivery Method): room air    CT PERFUSION:  No core infarct or acute ischemic penumbra.    CT ANGIOGRAPHY BRAIN:  No large vessel occlusion, significant stenosis, aneurysm or vascular   malformation.    CT ANGIOGRAPHY NECK:  Vasculature of the neck is patent without significant stenosis or   dissection.    MRI Brain   1.  No acute infarct or intracranial hemorrhage. Stable exam.  2.  Stable minimal chronic microvascular changes.  3.  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   - low suspicion for TME   - Obtain UTox, UA, Urine drug screen,   - Psych eval, informed psych and was asked to call back in the am  - B12, folate, TSH  - Continue with Asa/Statin/Amlodipine   - F/U neuro    Suspected DARREN Suspected Prerenal   Baseline creatinine: 1.0-1.2  Creatinine today 1.6, Consider Renal US,  Avoid nephrotoxic agents, Monitor BUN/creatinine, Send  Urine Lytes, Gentle Hydration     Transaminitis  - Pt has mild elevated LFTs  - Denies any drug/alcohol use   - Continue to trend LFTs  - Consider RUQ 59 year old male with PMH of HTN and recent chronic lacunar infarct with no residual deficits diagnosed in july24 coming to the hospital for momentary episodes of trembling and shaking associated with aphasia and impaired mobility. History complicated by multiple stressors and patient  is currently depressed with active suicidal thoughts but no active plan. Patient was a stroke code in the ED but negative for stroke, neuro consulted and Patient admitted for further work up.      Agree  with assessment  except for changes below.   Vital Signs Last 24 Hrs  T(C): 36.5 (25 Jul 2024 15:40), Max: 36.8 (24 Jul 2024 23:33)  T(F): 97.7 (25 Jul 2024 15:40), Max: 98.2 (24 Jul 2024 23:33)  HR: 84 (25 Jul 2024 15:40) (68 - 84)  BP: 130/84 (25 Jul 2024 15:40) (114/75 - 141/90)  BP(mean): --  RR: 18 (25 Jul 2024 15:40) (18 - 18)  SpO2: 96% (25 Jul 2024 15:40) (96% - 100%)    Parameters below as of 25 Jul 2024 15:40  Patient On (Oxygen Delivery Method): room air    CT PERFUSION:  No core infarct or acute ischemic penumbra.    CT ANGIOGRAPHY BRAIN:  No large vessel occlusion, significant stenosis, aneurysm or vascular   malformation.    CT ANGIOGRAPHY NECK:  Vasculature of the neck is patent without significant stenosis or   dissection.    MRI Brain   1.  No acute infarct or intracranial hemorrhage. Stable exam.  2.  Stable minimal chronic microvascular changes.  3.  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   - low suspicion for TME   - Obtain UTox, UA, Urine drug screen,   - Psych eval, informed psych and was asked to call back in the am  - B12, folate, TSH  - Continue with Asa/Statin/Amlodipine   - F/U neuro    Suspected DARREN Suspected Prerenal   Baseline creatinine: 1.0-1.2  Creatinine today 1.6, Consider Renal US,  Avoid nephrotoxic agents, Monitor BUN/creatinine, Send  Urine Lytes, Gentle Hydration     Transaminitis  - Pt has mild elevated LFTs  - Denies any drug/alcohol use   - Continue to trend LFTs  - Consider RUQ    Seen on 07/25

## 2024-07-25 NOTE — H&P ADULT - ASSESSMENT
- cmp, utox  UA  urinanalysis   CT, MRI   Patient is a 59 year old male with PMH of HTN and recent chronic lacunar infarct with no residual deficits diagnosed in july24 coming to the hospital for momentary episodes of trembling and shaking associated with aphasia and impaired mobility. History complicated by multiple stressors and Pt is currently depressed with active suicidal thoughts but no active plan. Pt was a stroke code in the ED but negative for stroke, neuro consulted and Pt admitted for further work up.     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   - low suspicion for TME   - Obtain UTox, UA, Urine drug screen,   - Urgent psych eval, informed psych but was asked to call back in the am - please call psych([phone]) in the am.   - Put on constant observation   - B12, folate, TSH  - Continue with Asa/Statin/Amlodipine   - F/U neuro    #DARREN   - Creat 1.6  - could be due to poor PO intake/dehydration  - encouraged PO hydration     #Transamnitis  - Pt has mild elevated LFTs  - Denies any drug/alcohol use   - Continue to trend     #MISC   - Diet - reg  - DVT prophy - heparin sub q  - Activity - AAT  - Gi prophy - not indicated    --> Pending : Psych consult, MRI with gadolinium, EEG, F/U Neuro, improvement in DARREN, LFTs.      Patient is a 59 year old male with PMH of HTN and recent chronic lacunar infarct with no residual deficits diagnosed in july24 coming to the hospital for momentary episodes of trembling and shaking associated with aphasia and impaired mobility. History complicated by multiple stressors and Pt is currently depressed with active suicidal thoughts but no active plan. Pt was a stroke code in the ED but negative for stroke, neuro consulted and Pt admitted for further work up.     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   - low suspicion for TME   - Obtain UTox, UA, Urine drug screen,   - Urgent psych eval, informed psych and was asked to call back in the am - please call psych([phone]) in the am.   - Put on constant observation   - B12, folate, TSH  - Continue with Asa/Statin/Amlodipine   - F/U neuro    #DARREN   - Creat 1.6  - could be due to poor PO intake/dehydration  - encouraged PO hydration     #Transamnitis  - Pt has mild elevated LFTs  - Denies any drug/alcohol use   - Continue to trend     #MISC   - Diet - reg  - DVT prophy - heparin sub q  - Activity - AAT  - Gi prophy - not indicated    --> Pending : Psych consult, MRI with gadolinium, EEG, F/U Neuro, improvement in DARREN, LFTs.

## 2024-07-25 NOTE — ED PROVIDER NOTE - PROGRESS NOTE DETAILS
Apple:  I discussed patient's presentation with telestroke attending .  She states that since NIHSS is 0, patient is not with TNK candidate at this time.

## 2024-07-25 NOTE — H&P ADULT - TIME-BASED BILLING (NON-CRITICAL CARE)
Spoke with regarding wanting to reschedule appointment for today ( 5/21/17 ). Patient stated she decided not to reschedule appointment.  Patient stated she will be at appointment today at 4:00pm with Dr. Dillard Allen Time-based billing (NON-critical care)

## 2024-07-25 NOTE — CONSULT NOTE ADULT - ASSESSMENT
Impression:  Patient is a 59 year old gentleman with PMH of HTN and recent depression/anxiety diagnosis. Recent negative MRI brain in July 2024. Patient went missing for 8 hours as per his wife. Was home and himself for several hours throughout the day but at 2000 was watching a sporting event on TV and began shaking his right hand and stopped speaking to his wife. Stroke code in ED. Patients family reported inconsistency in patients compliance with escitalopram. Patients exam is inconsistent. Smile symmetry is inconsistent, right hand shaking and alertness is inconsistent. Patient out of the window for IV thrombolytics, CTA without LVO not a candidate for IA intervention. Etiology of symptoms less likely neurovascular in nature, rule out epileptiform cause.     Suggestion:  MRI brain without rosa  Routine EEG  Seizure precautions  Keep magnesium >2  UTox  B12, folate, TSH  Telemetry monitoring  ED observational unit

## 2024-07-25 NOTE — H&P ADULT - NSHPPHYSICALEXAM_GEN_ALL_CORE
GENERAL: Flat affect  HEAD:  Atraumatic, normocephalic  EYES: EOMI, PERRLA, conjunctiva and sclera clear  NECK: Supple, trachea midline, no JVD  HEART: Regular rate and rhythm, no murmurs, rubs, or gallops  LUNGS: Unlabored respirations.  Clear to auscultation bilaterally, no crackles, wheezing, or rhonchi  ABDOMEN: Soft, nontender, nondistended, +BS  EXTREMITIES: 2+ peripheral pulses bilaterally. No clubbing, cyanosis, or edema  NERVOUS SYSTEM:  A&Ox3, moving all extremities, no focal deficits, left sided facial droop noticed

## 2024-07-25 NOTE — ED PROVIDER NOTE - CLINICAL SUMMARY MEDICAL DECISION MAKING FREE TEXT BOX
Patient presented with seizure-like symptoms.  On exam was found to have left facial droop which wife confirms dyspnea and stroke was activated.  Patient was evaluated by the stroke team who stated that his symptoms are resolved, NIHSS score is 0.  They recommended placement in ED OU for MRI of the brain and EEG and reassess.

## 2024-07-25 NOTE — H&P ADULT - HISTORY OF PRESENT ILLNESS
Patient is a 59 year old male with PMH of HTN and recent depression/anxiety diagnosis. Recent negative MRI brain in July 2024. Patient went missing for 8 hours as per his wife. Was home and himself for several hours throughout the day but at 2000 was watching a sporting event on TV and began shaking his right hand and stopped speaking to his wife. Stroke code in ED. Patients family reported inconsistency in patients compliance with escitalopram. Patients exam is inconsistent. Smile symmetry is inconsistent, right hand shaking and alertness is inconsistent. Patient out of the window for IV thrombolytics, CTA without LVO not a candidate for IA intervention. Etiology of symptoms less likely neurovascular in nature, rule out epileptiform cause.       60 yo RHM w/ HTN currently p/w episode of transient unresponsiveness in the broader setting of worsening cognition and ability to perform ADLs.  Had recent admission earlier this month for transient symptoms spontaneously resolved found to have incidental chronic stroke which magnified underlying anxiety and stress.  Per family also has had recent issues with wife after admitting to a recent extramarital affair 2 months which has increased his depression and anxiety to point of suicidal ideation.  When asked about where he when he left his brother house 2 days prior, he mentioned that he was "trying to get away" and admitted to thoughts of suicide but denies any attempts or actual plans.  Was recently prescribed escitalopram but has not started medications.  Has appointment with psychiatry later this week.     Per family has had recent significant weight loss over the last few months and currently family has noted intermittent twitching and shaking worse yesterday currently improved.  Pt denies any illicit drugs or ETOH.  Has appeared more withdrawn over the last 2 months but wife is unclear if this is due to recent affair and depression.  No HA/N/V/nuchal rigidity or stiffness.   No obvious focal neurologic deficits.  Has intermittent subtle myoclonic jerks particularly RUE.  (+) startle reflex.  Flat affect.  Suspect pseudodementia possibly from uncontrolled depression but cannot r/o toxic/metabolic cause for encephalopathy.  Recommend admit for further workup including psychiatric evaluation for uncontrolled depression/suicidal ideation.  Recommend repeat MRI Brain w/ gadolinium only, f/u EEG results and recommend medical w/u for encephalopathy for now.   Patient is a 59 year old male with PMH of HTN and recent chronic lacunar infarct with no residual deficits diagnosed in july24 coming to the hospital for momentary episodes of trembling and shaking associated with aphasia and impaired mobility. History goes back to 2 months ago since he disclosed to his wife about his extramarital affair and has been stressed/depressed. Patient also had episodes of worsening cognition, depressed mood and momentary episodes of trembling. Patient's wife brought him to the ED during one of these episodes on 7/5/24 and on imaging was found have a chronic age indeterminate lacunar infarct involving the anterior limb of the right external capsule. MRI head done at that time showed Minimal chronic microvascular type changes only. Patient was discharged on Asa & statin. Patient came back to ED again complaining of anxiety on 7/15 and suicidal thoughts & was discharged on escitalopram which he is not taking. Apparently two days ago pt was at his brother's house and was not found for around 8 hrs. When asked, pt said he was too stressed and wanted to be away from people, was having suicidal thoughts among many but denied an active plan. Patient was brought in to the ED in this admission for not being himself and episodes of shaking/trembling again per his wife.     On my encounter pt expressed being depressed with active suicidal thoughts but no active plan. Patient has an appointment with psychiatry coming up but is agreeable to see psych while admitted.     Triage vitals:     · Temp at ED Arrival (C)	36.8 Degrees C  · O2 Delivery/Oxygen Delivery Method	room air  · SpO2 (%)	100 %  · Temp (C)	36.8 Degrees C  · Temp (F)	98.2 Degrees F  · Respiration Rate (breaths/min)	18 /min  · Heart Rate	80 /min  · BP Diastolic	90 mm Hg  · BP Systolic	141 mm Hg    Patient was a stroke code in the ED, neuro consulted and stroke protocol followed with CTA & MRI as well.     CTA with not LVO, and MRI with No acute infarct or intracranial hemorrhage. 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    Pertinent labs: creat 1.6, transaminitis EKG NSR.     Pt is admitted to medicine for further work up and management.

## 2024-07-25 NOTE — CONSULT NOTE ADULT - SUBJECTIVE AND OBJECTIVE BOX
Neurology Consult    Patient is a 59y old  Male who presents with a chief complaint of lethargy and shaking    HPI:  Patient is a 59 year old gentleman with PMH of HTN and recent depression/anxiety diagnosis. Recent negative MRI brain in July 2024. Patient went missing for 8 hours as per his wife. Was home and himself for several hours throughout the day but at 2000 was watching a sporting event on TV and began shaking his right hand and stopped speaking to his wife. Stroke code in ED.     PAST MEDICAL & SURGICAL HISTORY:      FAMILY HISTORY:      Social History: (-) x 3    Allergies    No Known Allergies    Intolerances        MEDICATIONS  (STANDING):    MEDICATIONS  (PRN):      Vital Signs Last 24 Hrs  T(C): 36.8 (24 Jul 2024 23:33), Max: 36.8 (24 Jul 2024 23:33)  T(F): 98.2 (24 Jul 2024 23:33), Max: 98.2 (24 Jul 2024 23:33)  HR: 80 (24 Jul 2024 23:33) (80 - 80)  BP: 141/90 (24 Jul 2024 23:33) (141/90 - 141/90)  BP(mean): --  RR: 18 (24 Jul 2024 23:33) (18 - 18)  SpO2: 100% (24 Jul 2024 23:33) (100% - 100%)    Parameters below as of 24 Jul 2024 23:33  Patient On (Oxygen Delivery Method): room air        Examination:  Cognitive/Language: Alert with encouragement. The patient is oriented to person, place, time and date.  Recent and remote memory intact.  Fund of knowledge is intact and normal.  Language with normal repetition, comprehension and naming.  Nondysarthric.    Eyes: intact VA, VFF.  EOMI w/o nystagmus, skew or reported double vision.  PERRL.   Face:  Facial sensation normal V1 - 3, no facial asymmetry.    No cogwheeling, no increased tone.   Inconsistent course right arm shaking.   Formal Muscle Strength Testing: (MRC grade R/L) 5/5 UE; 5/5 LE.  No observable drift.  Reflexes:   2+ b/l pectoralis, biceps, triceps, brachioradialis, patella and Achilles.  Plantar response downgoing b/l.  Jaw jerk, Kyle, clonus absent.  Sensory examination:   Intact to light touch in all extremities.  Cerebellum:   FTN/HKS intact with normal AIDAN in all limbs.  No dysmetria or dysdiadokinesia.      NIHSS 0  mrs 0    Labs:   CBC Full  -  ( 25 Jul 2024 00:26 )  WBC Count : 7.15 K/uL  RBC Count : 4.25 M/uL  Hemoglobin : 14.3 g/dL  Hematocrit : 42.2 %  Platelet Count - Automated : 226 K/uL  Mean Cell Volume : 99.3 fL  Mean Cell Hemoglobin : 33.6 pg  Mean Cell Hemoglobin Concentration : 33.9 g/dL  Auto Neutrophil # : 5.02 K/uL  Auto Lymphocyte # : 1.35 K/uL  Auto Monocyte # : 0.61 K/uL  Auto Eosinophil # : 0.14 K/uL  Auto Basophil # : 0.02 K/uL  Auto Neutrophil % : 70.2 %  Auto Lymphocyte % : 18.9 %  Auto Monocyte % : 8.5 %  Auto Eosinophil % : 2.0 %  Auto Basophil % : 0.3 %    07-25    135  |  98  |  32<H>  ----------------------------<  88  5.0   |  25  |  1.6<H>    Ca    9.5      25 Jul 2024 00:26    TPro  7.2  /  Alb  4.6  /  TBili  0.8  /  DBili  x   /  AST  119<H>  /  ALT  43<H>  /  AlkPhos  58  07-25    LIVER FUNCTIONS - ( 25 Jul 2024 00:26 )  Alb: 4.6 g/dL / Pro: 7.2 g/dL / ALK PHOS: 58 U/L / ALT: 43 U/L / AST: 119 U/L / GGT: x           PT/INR - ( 25 Jul 2024 00:26 )   PT: 11.50 sec;   INR: 1.01 ratio         PTT - ( 25 Jul 2024 00:26 )  PTT:29.9 sec  Urinalysis Basic - ( 25 Jul 2024 00:26 )    Color: x / Appearance: x / SG: x / pH: x  Gluc: 88 mg/dL / Ketone: x  / Bili: x / Urobili: x   Blood: x / Protein: x / Nitrite: x   Leuk Esterase: x / RBC: x / WBC x   Sq Epi: x / Non Sq Epi: x / Bacteria: x          Neuroimaging:  < from: CT Brain Stroke Protocol (07.25.24 @ 00:43) >  IMPRESSION:    No CT evidence of large acute territorial infarct or acute intracranial   hemorrhage.    < end of copied text >  < from: CT Angio Neck Stroke Protocol w/ IV Cont (07.25.24 @ 00:52) >      IMPRESSION:  CT PERFUSION:  No core infarct or acute ischemic penumbra.    CT ANGIOGRAPHY BRAIN:  No large vessel occlusion, significant stenosis, aneurysm or vascular   malformation.    CT ANGIOGRAPHY NECK:  Vasculature of the neck is patent without significant stenosis or   dissection.    < end of copied text >       Neurology Consult    Patient is a 59y old  Male who presents with a chief complaint of lethargy and shaking    HPI:  Patient is a 59 year old gentleman with PMH of HTN and recent depression/anxiety diagnosis. Recent negative MRI brain in July 2024 for left head and face tingling. Patient went missing for 8 hours as per his wife. Returned home and was himself for several hours throughout the day but at 2000 was watching a sporting event on TV and began shaking his right hand and stopped speaking to his wife. Stroke code in ED.     PAST MEDICAL & SURGICAL HISTORY:      FAMILY HISTORY:      Social History: (-) x 3    Allergies    No Known Allergies    Intolerances        MEDICATIONS  (STANDING):    MEDICATIONS  (PRN):      Vital Signs Last 24 Hrs  T(C): 36.8 (24 Jul 2024 23:33), Max: 36.8 (24 Jul 2024 23:33)  T(F): 98.2 (24 Jul 2024 23:33), Max: 98.2 (24 Jul 2024 23:33)  HR: 80 (24 Jul 2024 23:33) (80 - 80)  BP: 141/90 (24 Jul 2024 23:33) (141/90 - 141/90)  BP(mean): --  RR: 18 (24 Jul 2024 23:33) (18 - 18)  SpO2: 100% (24 Jul 2024 23:33) (100% - 100%)    Parameters below as of 24 Jul 2024 23:33  Patient On (Oxygen Delivery Method): room air        Examination:  Cognitive/Language: Alert with encouragement. The patient is oriented to person, place, time and date.  Recent and remote memory intact.  Fund of knowledge is intact and normal.  Language with normal repetition, comprehension and naming.  Nondysarthric.    Eyes: intact VA, VFF.  EOMI w/o nystagmus, skew or reported double vision.  PERRL.   Face:  Facial sensation normal V1 - 3, no facial asymmetry.    No cogwheeling, no increased tone.   Inconsistent course right arm shaking.   Formal Muscle Strength Testing: (MRC grade R/L) 5/5 UE; 5/5 LE.  No observable drift.  Reflexes:   2+ b/l pectoralis, biceps, triceps, brachioradialis, patella and Achilles.  Plantar response downgoing b/l.  Jaw jerk, Kyle, clonus absent.  Sensory examination:   Intact to light touch in all extremities.  Cerebellum:   FTN/HKS intact with normal AIDAN in all limbs.  No dysmetria or dysdiadokinesia.      NIHSS 0  mrs 0    Labs:   CBC Full  -  ( 25 Jul 2024 00:26 )  WBC Count : 7.15 K/uL  RBC Count : 4.25 M/uL  Hemoglobin : 14.3 g/dL  Hematocrit : 42.2 %  Platelet Count - Automated : 226 K/uL  Mean Cell Volume : 99.3 fL  Mean Cell Hemoglobin : 33.6 pg  Mean Cell Hemoglobin Concentration : 33.9 g/dL  Auto Neutrophil # : 5.02 K/uL  Auto Lymphocyte # : 1.35 K/uL  Auto Monocyte # : 0.61 K/uL  Auto Eosinophil # : 0.14 K/uL  Auto Basophil # : 0.02 K/uL  Auto Neutrophil % : 70.2 %  Auto Lymphocyte % : 18.9 %  Auto Monocyte % : 8.5 %  Auto Eosinophil % : 2.0 %  Auto Basophil % : 0.3 %    07-25    135  |  98  |  32<H>  ----------------------------<  88  5.0   |  25  |  1.6<H>    Ca    9.5      25 Jul 2024 00:26    TPro  7.2  /  Alb  4.6  /  TBili  0.8  /  DBili  x   /  AST  119<H>  /  ALT  43<H>  /  AlkPhos  58  07-25    LIVER FUNCTIONS - ( 25 Jul 2024 00:26 )  Alb: 4.6 g/dL / Pro: 7.2 g/dL / ALK PHOS: 58 U/L / ALT: 43 U/L / AST: 119 U/L / GGT: x           PT/INR - ( 25 Jul 2024 00:26 )   PT: 11.50 sec;   INR: 1.01 ratio         PTT - ( 25 Jul 2024 00:26 )  PTT:29.9 sec  Urinalysis Basic - ( 25 Jul 2024 00:26 )    Color: x / Appearance: x / SG: x / pH: x  Gluc: 88 mg/dL / Ketone: x  / Bili: x / Urobili: x   Blood: x / Protein: x / Nitrite: x   Leuk Esterase: x / RBC: x / WBC x   Sq Epi: x / Non Sq Epi: x / Bacteria: x          Neuroimaging:  < from: CT Brain Stroke Protocol (07.25.24 @ 00:43) >  IMPRESSION:    No CT evidence of large acute territorial infarct or acute intracranial   hemorrhage.    < end of copied text >  < from: CT Angio Neck Stroke Protocol w/ IV Cont (07.25.24 @ 00:52) >      IMPRESSION:  CT PERFUSION:  No core infarct or acute ischemic penumbra.    CT ANGIOGRAPHY BRAIN:  No large vessel occlusion, significant stenosis, aneurysm or vascular   malformation.    CT ANGIOGRAPHY NECK:  Vasculature of the neck is patent without significant stenosis or   dissection.    < end of copied text >

## 2024-07-25 NOTE — CONSULT NOTE ADULT - NS ATTEND AMEND GEN_ALL_CORE FT
60 yo RHM w/ HTN currently p/w episode of transient unresponsiveness in the broader setting of worsening cognition and ability to perform ADLs.  Had recent admission earlier this month for transient symptoms spontaneously resolved found to have incidental chronic stroke which magnified underlying anxiety and stress.  Per family also has had recent issues with wife after admitting to a recent extramarital affair 2 months which has increased his depression and anxiety to point of suicidal ideation.  When asked about where he when he left his brother house 2 days prior, he mentioned that he was "trying to get away" and admitted to thoughts of suicide but denies any attempts or actual plans.  Was recently prescribed escitalopram but has not started medications.  Has appointment with psychiatry later this week.     Per family has had recent significant weight loss over the last few months and currently family has noted intermittent twitching and shaking worse yesterday currently improved.  Pt denies any illicit drugs or ETOH.  Has appeared more withdrawn over the last 2 months but wife is unclear if this is due to recent affair and depression.  No HA/N/V/nuchal rigidity or stiffness.   No obvious focal neurologic deficits.  Has intermittent subtle myoclonic jerks particularly RUE.  (+) startle reflex.  Suspect pseudodementia possibly from uncontrolled depression but cannot r/o toxic/metabolic cause for encephalopathy.  Recommend admit for further workup including psychiatric evaluation for uncontrolled depression/suicidal ideation.  Recommend repeat MRI Brain w/ gadolinium only, f/u EEG results and recommend medical w/u for encephalopathy for now. 60 yo RHM w/ HTN currently p/w episode of transient unresponsiveness in the broader setting of worsening cognition and ability to perform ADLs.  Had recent admission earlier this month for transient symptoms spontaneously resolved found to have incidental chronic stroke which magnified underlying anxiety and stress.  Per family also has had recent issues with wife after admitting to a recent extramarital affair 2 months which has increased his depression and anxiety to point of suicidal ideation.  When asked about where he when he left his brother house 2 days prior, he mentioned that he was "trying to get away" and admitted to thoughts of suicide but denies any attempts or actual plans.  Was recently prescribed escitalopram but has not started medications.  Has appointment with psychiatry later this week.     Per family has had recent significant weight loss over the last few months and currently family has noted intermittent twitching and shaking worse yesterday currently improved.  Pt denies any illicit drugs or ETOH.  Has appeared more withdrawn over the last 2 months but wife is unclear if this is due to recent affair and depression.  No HA/N/V/nuchal rigidity or stiffness.   No obvious focal neurologic deficits.  Has intermittent subtle myoclonic jerks particularly RUE.  (+) startle reflex.  Flat affect.  Suspect pseudodementia possibly from uncontrolled depression but cannot r/o toxic/metabolic cause for encephalopathy.  Recommend admit for further workup including psychiatric evaluation for uncontrolled depression/suicidal ideation.  Recommend repeat MRI Brain w/ gadolinium only, f/u EEG results and recommend medical w/u for encephalopathy for now.

## 2024-07-26 LAB
ALBUMIN SERPL ELPH-MCNC: 4.3 G/DL — SIGNIFICANT CHANGE UP (ref 3.5–5.2)
ALP SERPL-CCNC: 60 U/L — SIGNIFICANT CHANGE UP (ref 30–115)
ALT FLD-CCNC: 39 U/L — SIGNIFICANT CHANGE UP (ref 0–41)
ANION GAP SERPL CALC-SCNC: 13 MMOL/L — SIGNIFICANT CHANGE UP (ref 7–14)
ANION GAP SERPL CALC-SCNC: 9 MMOL/L — SIGNIFICANT CHANGE UP (ref 7–14)
APPEARANCE UR: CLEAR — SIGNIFICANT CHANGE UP
AST SERPL-CCNC: 61 U/L — HIGH (ref 0–41)
BILIRUB SERPL-MCNC: 0.8 MG/DL — SIGNIFICANT CHANGE UP (ref 0.2–1.2)
BILIRUB UR-MCNC: NEGATIVE — SIGNIFICANT CHANGE UP
BUN SERPL-MCNC: 14 MG/DL — SIGNIFICANT CHANGE UP (ref 10–20)
BUN SERPL-MCNC: 19 MG/DL — SIGNIFICANT CHANGE UP (ref 10–20)
CALCIUM SERPL-MCNC: 7.4 MG/DL — LOW (ref 8.4–10.5)
CALCIUM SERPL-MCNC: 9.2 MG/DL — SIGNIFICANT CHANGE UP (ref 8.4–10.5)
CHLORIDE SERPL-SCNC: 100 MMOL/L — SIGNIFICANT CHANGE UP (ref 98–110)
CHLORIDE SERPL-SCNC: 106 MMOL/L — SIGNIFICANT CHANGE UP (ref 98–110)
CO2 SERPL-SCNC: 24 MMOL/L — SIGNIFICANT CHANGE UP (ref 17–32)
CO2 SERPL-SCNC: 24 MMOL/L — SIGNIFICANT CHANGE UP (ref 17–32)
COLOR SPEC: YELLOW — SIGNIFICANT CHANGE UP
CREAT SERPL-MCNC: 0.9 MG/DL — SIGNIFICANT CHANGE UP (ref 0.7–1.5)
CREAT SERPL-MCNC: 1 MG/DL — SIGNIFICANT CHANGE UP (ref 0.7–1.5)
DIFF PNL FLD: NEGATIVE — SIGNIFICANT CHANGE UP
DRUG SCREEN 1, URINE RESULT: SIGNIFICANT CHANGE UP
EGFR: 86 ML/MIN/1.73M2 — SIGNIFICANT CHANGE UP
EGFR: 98 ML/MIN/1.73M2 — SIGNIFICANT CHANGE UP
GLUCOSE SERPL-MCNC: 138 MG/DL — HIGH (ref 70–99)
GLUCOSE SERPL-MCNC: 89 MG/DL — SIGNIFICANT CHANGE UP (ref 70–99)
GLUCOSE UR QL: NEGATIVE MG/DL — SIGNIFICANT CHANGE UP
HCT VFR BLD CALC: 43.5 % — SIGNIFICANT CHANGE UP (ref 42–52)
HGB BLD-MCNC: 14.6 G/DL — SIGNIFICANT CHANGE UP (ref 14–18)
KETONES UR-MCNC: NEGATIVE MG/DL — SIGNIFICANT CHANGE UP
LEUKOCYTE ESTERASE UR-ACNC: NEGATIVE — SIGNIFICANT CHANGE UP
MCHC RBC-ENTMCNC: 33.3 PG — HIGH (ref 27–31)
MCHC RBC-ENTMCNC: 33.6 G/DL — SIGNIFICANT CHANGE UP (ref 32–37)
MCV RBC AUTO: 99.3 FL — HIGH (ref 80–94)
NITRITE UR-MCNC: NEGATIVE — SIGNIFICANT CHANGE UP
NRBC # BLD: 0 /100 WBCS — SIGNIFICANT CHANGE UP (ref 0–0)
PH UR: 6 — SIGNIFICANT CHANGE UP (ref 5–8)
PLATELET # BLD AUTO: 245 K/UL — SIGNIFICANT CHANGE UP (ref 130–400)
PMV BLD: 9.1 FL — SIGNIFICANT CHANGE UP (ref 7.4–10.4)
POTASSIUM SERPL-MCNC: 3.8 MMOL/L — SIGNIFICANT CHANGE UP (ref 3.5–5)
POTASSIUM SERPL-MCNC: 4.5 MMOL/L — SIGNIFICANT CHANGE UP (ref 3.5–5)
POTASSIUM SERPL-SCNC: 3.8 MMOL/L — SIGNIFICANT CHANGE UP (ref 3.5–5)
POTASSIUM SERPL-SCNC: 4.5 MMOL/L — SIGNIFICANT CHANGE UP (ref 3.5–5)
PROT SERPL-MCNC: 6.6 G/DL — SIGNIFICANT CHANGE UP (ref 6–8)
PROT UR-MCNC: SIGNIFICANT CHANGE UP MG/DL
RBC # BLD: 4.38 M/UL — LOW (ref 4.7–6.1)
RBC # FLD: 13.2 % — SIGNIFICANT CHANGE UP (ref 11.5–14.5)
SODIUM SERPL-SCNC: 137 MMOL/L — SIGNIFICANT CHANGE UP (ref 135–146)
SODIUM SERPL-SCNC: 139 MMOL/L — SIGNIFICANT CHANGE UP (ref 135–146)
SP GR SPEC: 1.02 — SIGNIFICANT CHANGE UP (ref 1–1.03)
TROPONIN T, HIGH SENSITIVITY RESULT: 23 NG/L — HIGH (ref 6–21)
TSH SERPL-MCNC: 0.56 UIU/ML — SIGNIFICANT CHANGE UP (ref 0.27–4.2)
UROBILINOGEN FLD QL: 1 MG/DL — SIGNIFICANT CHANGE UP (ref 0.2–1)
WBC # BLD: 5.36 K/UL — SIGNIFICANT CHANGE UP (ref 4.8–10.8)
WBC # FLD AUTO: 5.36 K/UL — SIGNIFICANT CHANGE UP (ref 4.8–10.8)

## 2024-07-26 PROCEDURE — 95819 EEG AWAKE AND ASLEEP: CPT | Mod: 26

## 2024-07-26 PROCEDURE — 99232 SBSQ HOSP IP/OBS MODERATE 35: CPT

## 2024-07-26 PROCEDURE — 90792 PSYCH DIAG EVAL W/MED SRVCS: CPT | Mod: 95

## 2024-07-26 PROCEDURE — 70552 MRI BRAIN STEM W/DYE: CPT | Mod: 26

## 2024-07-26 RX ADMIN — ATORVASTATIN CALCIUM 10 MILLIGRAM(S): 40 TABLET, FILM COATED ORAL at 21:05

## 2024-07-26 RX ADMIN — Medication 81 MILLIGRAM(S): at 11:15

## 2024-07-26 RX ADMIN — HEPARIN SODIUM 5000 UNIT(S): 1000 INJECTION, SOLUTION INTRAVENOUS; SUBCUTANEOUS at 21:05

## 2024-07-26 RX ADMIN — AMLODIPINE BESYLATE 5 MILLIGRAM(S): 2.5 TABLET ORAL at 05:04

## 2024-07-26 NOTE — BH CONSULTATION LIAISON ASSESSMENT NOTE - NSBHCONSULTMEDAGITATION_PSY_A_CORE FT
We recommend Haldol 2 mg P.O Q 6 hrs PRN for agitation. Please note that this medication can be given via the intramuscular route if the patient is severely agitated and is considered a danger to himself or others. Please ensure that QTC is < 500

## 2024-07-26 NOTE — BH CONSULTATION LIAISON ASSESSMENT NOTE - NSBHMSEINTELL_PSY_A_CORE
"Pt bp recycled, admit team paged w/ pt bp  Pt able to follow commands, denies dizziness, denies discomfort, pt confirms feels "just fine" at this time  MAP is 65  " Average

## 2024-07-26 NOTE — PROGRESS NOTE ADULT - SUBJECTIVE AND OBJECTIVE BOX
JULIO CÉSAR DURANT 59y Male  MRN#: [id number]     Hospital Day: 1d    Pt is currently admitted with the primary diagnosis of  Other encephalopathy        SUBJECTIVE     Overnight events  None    Subjective complaints  Pt was evaluated this am. Patient denied any active complaints and per patient his symptoms are improving                                            ----------------------------------------------------------  OBJECTIVE  PAST MEDICAL & SURGICAL HISTORY                                            -----------------------------------------------------------  ALLERGIES:  No Known Allergies                                            ------------------------------------------------------------    HOME MEDICATIONS  Home Medications:  aspirin 81 mg oral tablet: 1 tab(s) orally once a day (25 Jul 2024 19:47)  atorvastatin 10 mg oral tablet: 1 tab(s) orally once a day (at bedtime) (25 Jul 2024 19:47)  Norvasc 5 mg oral tablet: 1 tab(s) orally once a day (25 Jul 2024 19:47)                           MEDICATIONS:  STANDING MEDICATIONS  amLODIPine   Tablet 5 milliGRAM(s) Oral daily  aspirin enteric coated 81 milliGRAM(s) Oral daily  atorvastatin 10 milliGRAM(s) Oral at bedtime  heparin   Injectable 5000 Unit(s) SubCutaneous every 8 hours  sodium chloride 0.9%. 1000 milliLiter(s) IV Continuous <Continuous>    PRN MEDICATIONS                                            ------------------------------------------------------------  VITAL SIGNS: Last 24 Hours  T(C): 36.9 (26 Jul 2024 05:00), Max: 36.9 (26 Jul 2024 05:00)  T(F): 98.5 (26 Jul 2024 05:00), Max: 98.5 (26 Jul 2024 05:00)  HR: 64 (26 Jul 2024 05:00) (64 - 84)  BP: 116/76 (26 Jul 2024 05:00) (106/66 - 133/87)  BP(mean): --  RR: 18 (26 Jul 2024 05:00) (18 - 18)  SpO2: 97% (26 Jul 2024 05:00) (96% - 98%)                                             --------------------------------------------------------------  LABS:                        14.3   7.15  )-----------( 226      ( 25 Jul 2024 00:26 )             42.2     07-25    135  |  98  |  32<H>  ----------------------------<  88  5.0   |  25  |  1.6<H>    Ca    9.5      25 Jul 2024 00:26    TPro  7.2  /  Alb  4.6  /  TBili  0.8  /  DBili  x   /  AST  119<H>  /  ALT  43<H>  /  AlkPhos  58  07-25    PT/INR - ( 25 Jul 2024 00:26 )   PT: 11.50 sec;   INR: 1.01 ratio         PTT - ( 25 Jul 2024 00:26 )  PTT:29.9 sec  Urinalysis Basic - ( 25 Jul 2024 00:26 )    Color: x / Appearance: x / SG: x / pH: x  Gluc: 88 mg/dL / Ketone: x  / Bili: x / Urobili: x   Blood: x / Protein: x / Nitrite: x   Leuk Esterase: x / RBC: x / WBC x   Sq Epi: x / Non Sq Epi: x / Bacteria: x                                                -------------------------------------------------------------  RADIOLOGY:    CT Angio Brain Stroke Protocol  w/ IV Cont (07.25.24 @ 00:52) >  CT PERFUSION:  No core infarct or acute ischemic penumbra.    CT ANGIOGRAPHY BRAIN:  No large vessel occlusion, significant stenosis, aneurysm or vascular   malformation.    CT ANGIOGRAPHY NECK:  Vasculature of the neck is patent without significant stenosis or   dissection.     MR Head No Cont (07.25.24 @ 11:39) >  1.  No acute infarct or intracranial hemorrhage. Stable exam.    2.  Stable minimal chronic microvascular changes.    3.  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                                                --------------------------------------------------------------    PHYSICAL EXAM:  GENERAL: NAD, lying in bed comfortably  HEAD:  Atraumatic, Normocephalic  EYES: EOMI, conjunctiva and sclera clear  ENT: Moist mucous membranes  NECK: Supple, No JVD  CHEST/LUNG: Clear to auscultation bilaterally; No rales, rhonchi, wheezing, or rubs. Unlabored respirations  HEART: regular rate and rhythm; No murmurs, rubs, or gallops  ABDOMEN: Bowel sounds present; Soft, Nontender, Nondistended.    EXTREMITIES: Warm. No clubbing, cyanosis, or edema  NERVOUS SYSTEM:  Alert & Oriented X3. No focal deficits   SKIN: No rashes or lesions                                           --------------------------------------------------------------

## 2024-07-26 NOTE — BH CONSULTATION LIAISON ASSESSMENT NOTE - NSBHCONSULTRECOMMENDOTHER_PSY_A_CORE FT
1. There is no acute indication to start any psychotropic medication for now   2. Patient was offered Trazodone 50mg bedtime for insomnia but refused this medication   3. We recommend melatonin 5 –10 mg P.O bedtime to regulate sleep wake cycle   4. We recommend behavioral interventions, and environmental modifications to help patient's orientation and help him feel safe in addition to implementing delirium precautions as per nursing staff.   5. Patient will benefit from supportive psychotherapy to help him develop better coping skills and better frustration tolerance to address his stressors.  6. No further psychiatric intervention is indicated for now , please re-call as needed

## 2024-07-26 NOTE — BH CONSULTATION LIAISON ASSESSMENT NOTE - NSBHCHARTREVIEWLAB_PSY_A_CORE FT
14.6   5.36  )-----------( 245      ( 26 Jul 2024 09:52 )             43.5     07-26    137  |  100  |  19  ----------------------------<  138<H>  4.5   |  24  |  1.0    Ca    9.2      26 Jul 2024 09:52    TPro  6.6  /  Alb  4.3  /  TBili  0.8  /  DBili  x   /  AST  61<H>  /  ALT  39  /  AlkPhos  60  07-26

## 2024-07-26 NOTE — PROGRESS NOTE ADULT - ASSESSMENT
Patient is a 59 year old male with PMH of HTN and recent chronic lacunar infarct with no residual deficits diagnosed in july24 coming to the hospital for momentary episodes of trembling and shaking associated with aphasia and impaired mobility. History complicated by multiple stressors and Pt is currently depressed with active suicidal thoughts but no active plan. Pt was a stroke code in the ED but negative for stroke, neuro consulted and Pt admitted for further work up.     1. 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   - low suspicion for TME   - Obtain UTox, UA, Urine drug screen,   - Urgent psych eval, informed psych and was asked to call back in the am - please call psych([phone]) in the am.   - Put on constant observation   - f/u B12, folate, TSH  - Continue with Asa/Statin/Amlodipine   - F/U neuro    #DARREN   - Creat 1.6  - could be due to poor PO intake/dehydration  - encouraged PO hydration     #Transamnitis  - Pt has mild elevated LFTs  - Denies any drug/alcohol use   - Continue to trend     #MISC   - Diet - reg  - DVT prophy - heparin sub q  - Activity - AAT  - Gi prophy - not indicated    --> Pending : Psych consult, MRI with gadolinium, EEG, F/U Neuro, improvement in DARREN, LFTs.        Patient is a 59 year old male with PMH of HTN and recent chronic lacunar infarct with no residual deficits diagnosed in july24 coming to the hospital for momentary episodes of trembling and shaking associated with aphasia and impaired mobility. History complicated by multiple stressors and Pt is currently depressed with active suicidal thoughts but no active plan. Pt was a stroke code in the ED but negative for stroke, neuro consulted and Pt admitted for further work up.     1. 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 Minimal chronic microvascular type changes.  EEG negative  & medical w/u for encephalopathy - Neuro recommending LP but patient refusing - will discuss LP under anesthesia  - low suspicion for TME   - f/u UTox, UA, Urine drug screen  - Urgent psych eval, informed psych and was asked to call back in the am - please call psych([phone]) in the am.   - Put on constant observation   - f/u B12, folate, TSH 0.56   - Continue with Asa/Statin/Amlodipine   - F/U neuro      #DARREN - resolved   - Creat 1.6 - > 1.0  - could be due to poor PO intake/dehydration  - encouraged PO hydration     #Transamnitis  - Pt has mild elevated LFTs  - Denies any drug/alcohol use   - Continue to trend         - DVT prophy - heparin sub q  - Gi prophy - not indicated    --> Pending :   EEG, F/U Neuro, improvement in DARREN, LFTs.

## 2024-07-26 NOTE — BH CONSULTATION LIAISON ASSESSMENT NOTE - SUMMARY
Mr Pizarro is a 59 year old man with a history of depression who was admitted to the medical floor for the evaluation of Altered mental status  . According to the medical team, patient has significant marital stressors and was diagnosed with Depression on 7/15, was placed on Lexapro but has not been taking it . psychiatry consult was called for the evaluation of depressed mood and possible suicidal ideations .   It seems that patient made a suicidal statement in the context of the distress and feeling of demoralization he was experiencing in the context of his social stressors . He seems to have significant difficult coping with this stressor with associated feelings of shame , guilt and disappointment in himself despite the support he has of his family.   Although he has significant feelings of depression, his symptom do not meet criteria for a Major depressive disorder for now . He denies having current suicidal ideations, intent or plan.   For now, patients do not appear to have acute symptoms of psychosis or alexandro .  At this time, patient is not considered an imminent danger to himself or others and does not need inpatient psychiatric hospitalization.

## 2024-07-26 NOTE — CHART NOTE - NSCHARTNOTEFT_GEN_A_CORE
As per neurology, patient was advised for Lumbar puncture to rule out any brain infection/inflammation (like HSV, autoimmune encephalitis) as in his MRI brain, right temporal lobe looks smaller.   Patient refused the procedure.   Patient was explained the risk and benefits of not getting the procedure.   Patient still refusing the procedure.
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
## 2024-07-26 NOTE — BH CONSULTATION LIAISON ASSESSMENT NOTE - NSBHADMITIPOBS_PSY_A_CORE
If you are a smoker, it is important for your health to stop smoking. Please be aware that second hand smoke is also harmful.
Routine observation

## 2024-07-26 NOTE — BH CONSULTATION LIAISON ASSESSMENT NOTE - CURRENT MEDICATION
MEDICATIONS  (STANDING):  amLODIPine   Tablet 5 milliGRAM(s) Oral daily  aspirin enteric coated 81 milliGRAM(s) Oral daily  atorvastatin 10 milliGRAM(s) Oral at bedtime  heparin   Injectable 5000 Unit(s) SubCutaneous every 8 hours  sodium chloride 0.9%. 1000 milliLiter(s) (75 mL/Hr) IV Continuous <Continuous>    MEDICATIONS  (PRN):

## 2024-07-26 NOTE — BH CONSULTATION LIAISON ASSESSMENT NOTE - NSBHCONSULTFOLLOWAFTERCARE_PSY_A_CORE FT
Upon discharge, patient  should keep his intake appointment as scheduled on Tuesday, 7/30/2024 at [facility], [address] [place] [address], and Phone number: [phone] Upon discharge, patient  should keep his intake appointment as scheduled on Tuesday, 7/30/2024 at [facility], [address] [place] [address], and Phone number: [phone] with therapist Erendira.
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
## 2024-07-26 NOTE — BH CONSULTATION LIAISON ASSESSMENT NOTE - NSBHCHARTREVIEWVS_PSY_A_CORE FT
Vital Signs Last 24 Hrs  T(C): 36.8 (26 Jul 2024 12:16), Max: 36.9 (26 Jul 2024 05:00)  T(F): 98.2 (26 Jul 2024 12:16), Max: 98.5 (26 Jul 2024 05:00)  HR: 78 (26 Jul 2024 12:16) (64 - 84)  BP: 108/72 (26 Jul 2024 12:16) (106/66 - 133/87)  BP(mean): --  RR: 16 (26 Jul 2024 12:16) (16 - 18)  SpO2: 99% (26 Jul 2024 12:16) (96% - 99%)    Parameters below as of 26 Jul 2024 03:25  Patient On (Oxygen Delivery Method): room air

## 2024-07-26 NOTE — EEG REPORT - NS EEG TEXT BOX
Patient Name:	JULIO CÉSAR DURANT    :	1965  MRN:	[id number]  Study Date/Time:	2024, 9:18:13 PM  Referred by:	-    Brief Clinical History:  JULIO CÉSAR DURANT is a 59 year old Male; study performed to investigate for seizures or markers of epilepsy.   Diagnosis Code: R40.4 Transient alteration of awareness  CPT:  64352 (awake/drowsy)     Patient Medication:  ecotrin    heparin    norvasc    lipitor      Acquisition Details:  Electroencephalography was acquired using a minimum of 21 channels on an [employer] Neurology system v 9.3.1 with electrode placement according to the standard International 10-20 system following ACNS (American Clinical Neurophysiology Society) guidelines.  Anterior temporal T1 and T2 electrodes were utilized whenever possible.   The [employer] automated spike & seizure detections were all reviewed in detail, in addition to the entire raw EEG.    Findings:  Background:  continuous. No cortical activity seen at 2 microvolts/mm.  Voltage:  Normal (20uV)  Organization:  Appropriate anterior-posterior gradient  Posterior Dominant Rhythm:  8.5 Hz symmetric, well-organized, and well-modulated  Variability:  Yes	Reactivity:  Yes  Sleep:  Absent.  Focal abnormalities:  No persistent asymmetries of voltage or frequency.  Interictal Activity:  None  Location:    Focal Slowing:  None  Generalized Slowing:  No  Events:  1)	No electrographic seizures or significant clinical events.  Provocations:  1)	Hyperventilation: was not performed.  2)	Photic stimulation: was not performed.  Impression:  normal    Clinical Correlation:  Normal study does not exclude diagnosis of seizure disorder    Tom Gonzalez MD  Attending Neurologist, Division of Epilepsy
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
## 2024-07-27 LAB
ANION GAP SERPL CALC-SCNC: 11 MMOL/L — SIGNIFICANT CHANGE UP (ref 7–14)
BUN SERPL-MCNC: 12 MG/DL — SIGNIFICANT CHANGE UP (ref 10–20)
CALCIUM SERPL-MCNC: 9 MG/DL — SIGNIFICANT CHANGE UP (ref 8.4–10.5)
CHLORIDE SERPL-SCNC: 103 MMOL/L — SIGNIFICANT CHANGE UP (ref 98–110)
CO2 SERPL-SCNC: 24 MMOL/L — SIGNIFICANT CHANGE UP (ref 17–32)
CREAT SERPL-MCNC: 1.1 MG/DL — SIGNIFICANT CHANGE UP (ref 0.7–1.5)
EGFR: 77 ML/MIN/1.73M2 — SIGNIFICANT CHANGE UP
FOLATE SERPL-MCNC: 14 NG/ML — SIGNIFICANT CHANGE UP
GLUCOSE SERPL-MCNC: 94 MG/DL — SIGNIFICANT CHANGE UP (ref 70–99)
HCT VFR BLD CALC: 42.7 % — SIGNIFICANT CHANGE UP (ref 42–52)
HGB BLD-MCNC: 14.4 G/DL — SIGNIFICANT CHANGE UP (ref 14–18)
MCHC RBC-ENTMCNC: 33.5 PG — HIGH (ref 27–31)
MCHC RBC-ENTMCNC: 33.7 G/DL — SIGNIFICANT CHANGE UP (ref 32–37)
MCV RBC AUTO: 99.3 FL — HIGH (ref 80–94)
NRBC # BLD: 0 /100 WBCS — SIGNIFICANT CHANGE UP (ref 0–0)
PLATELET # BLD AUTO: 260 K/UL — SIGNIFICANT CHANGE UP (ref 130–400)
PMV BLD: 9 FL — SIGNIFICANT CHANGE UP (ref 7.4–10.4)
POTASSIUM SERPL-MCNC: 4.3 MMOL/L — SIGNIFICANT CHANGE UP (ref 3.5–5)
POTASSIUM SERPL-SCNC: 4.3 MMOL/L — SIGNIFICANT CHANGE UP (ref 3.5–5)
RBC # BLD: 4.3 M/UL — LOW (ref 4.7–6.1)
RBC # FLD: 13.2 % — SIGNIFICANT CHANGE UP (ref 11.5–14.5)
SODIUM SERPL-SCNC: 138 MMOL/L — SIGNIFICANT CHANGE UP (ref 135–146)
VIT B12 SERPL-MCNC: 1871 PG/ML — HIGH (ref 232–1245)
WBC # BLD: 3.9 K/UL — LOW (ref 4.8–10.8)
WBC # FLD AUTO: 3.9 K/UL — LOW (ref 4.8–10.8)

## 2024-07-27 PROCEDURE — 99232 SBSQ HOSP IP/OBS MODERATE 35: CPT

## 2024-07-27 RX ADMIN — AMLODIPINE BESYLATE 5 MILLIGRAM(S): 2.5 TABLET ORAL at 05:07

## 2024-07-27 RX ADMIN — HEPARIN SODIUM 5000 UNIT(S): 1000 INJECTION, SOLUTION INTRAVENOUS; SUBCUTANEOUS at 05:07

## 2024-07-27 RX ADMIN — HEPARIN SODIUM 5000 UNIT(S): 1000 INJECTION, SOLUTION INTRAVENOUS; SUBCUTANEOUS at 21:57

## 2024-07-27 RX ADMIN — HEPARIN SODIUM 5000 UNIT(S): 1000 INJECTION, SOLUTION INTRAVENOUS; SUBCUTANEOUS at 13:39

## 2024-07-27 RX ADMIN — Medication 75 MILLILITER(S): at 18:52

## 2024-07-27 RX ADMIN — Medication 81 MILLIGRAM(S): at 12:37

## 2024-07-27 RX ADMIN — ATORVASTATIN CALCIUM 10 MILLIGRAM(S): 40 TABLET, FILM COATED ORAL at 21:57

## 2024-07-27 NOTE — PROGRESS NOTE ADULT - SUBJECTIVE AND OBJECTIVE BOX
JULIO CÉSAR DURANT 59y Male  MRN#: [id number]     Hospital Day: 2d    Pt is currently admitted with the primary diagnosis of  Other encephalopathy        SUBJECTIVE     Overnight events  None    Subjective complaints  Pt was evaluated this am. Patient denied any active complaints and per patient his symptoms are improving                                            ----------------------------------------------------------  OBJECTIVE  PAST MEDICAL & SURGICAL HISTORY                                            -----------------------------------------------------------  ALLERGIES:  No Known Allergies                                            ------------------------------------------------------------    HOME MEDICATIONS  Home Medications:  aspirin 81 mg oral tablet: 1 tab(s) orally once a day (25 Jul 2024 19:47)  atorvastatin 10 mg oral tablet: 1 tab(s) orally once a day (at bedtime) (25 Jul 2024 19:47)  Norvasc 5 mg oral tablet: 1 tab(s) orally once a day (25 Jul 2024 19:47)                           MEDICATIONS:  STANDING MEDICATIONS  amLODIPine   Tablet 5 milliGRAM(s) Oral daily  aspirin enteric coated 81 milliGRAM(s) Oral daily  atorvastatin 10 milliGRAM(s) Oral at bedtime  heparin   Injectable 5000 Unit(s) SubCutaneous every 8 hours  sodium chloride 0.9%. 1000 milliLiter(s) IV Continuous <Continuous>    PRN MEDICATIONS                                            ------------------------------------------------------------  VITAL SIGNS: Last 24 Hours  T(C): 36.8 (27 Jul 2024 05:00), Max: 36.8 (26 Jul 2024 12:16)  T(F): 98.2 (27 Jul 2024 05:00), Max: 98.2 (26 Jul 2024 12:16)  HR: 81 (27 Jul 2024 05:00) (73 - 81)  BP: 133/85 (27 Jul 2024 05:00) (108/72 - 133/85)  BP(mean): --  RR: 18 (27 Jul 2024 05:00) (16 - 18)  SpO2: 97% (27 Jul 2024 05:00) (97% - 99%)      07-26-24 @ 07:01  -  07-27-24 @ 07:00  --------------------------------------------------------  IN: 900 mL / OUT: 870 mL / NET: 30 mL                                             --------------------------------------------------------------  LABS:                        14.6   5.36  )-----------( 245      ( 26 Jul 2024 09:52 )             43.5     07-26    139  |  106  |  14  ----------------------------<  89  3.8   |  24  |  0.9    Ca    7.4<L>      26 Jul 2024 16:00    TPro  6.6  /  Alb  4.3  /  TBili  0.8  /  DBili  x   /  AST  61<H>  /  ALT  39  /  AlkPhos  60  07-26      Urinalysis Basic - ( 26 Jul 2024 16:00 )    Color: x / Appearance: x / SG: x / pH: x  Gluc: 89 mg/dL / Ketone: x  / Bili: x / Urobili: x   Blood: x / Protein: x / Nitrite: x   Leuk Esterase: x / RBC: x / WBC x   Sq Epi: x / Non Sq Epi: x / Bacteria: x                                              -------------------------------------------------------------  RADIOLOGY:  CT Angio Brain Stroke Protocol  w/ IV Cont (07.25.24 @ 00:52) >  CT PERFUSION:  No core infarct or acute ischemic penumbra.    CT ANGIOGRAPHY BRAIN:  No large vessel occlusion, significant stenosis, aneurysm or vascular   malformation.    CT ANGIOGRAPHY NECK:  Vasculature of the neck is patent without significant stenosis or   dissection.     MR Head No Cont (07.25.24 @ 11:39) >  1.  No acute infarct or intracranial hemorrhage. Stable exam.    2.  Stable minimal chronic microvascular changes.    3.  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    No acute intracranial pathology or abnormal enhancement.    Minimal chronic microvascular type changes.                                            --------------------------------------------------------------    PHYSICAL EXAM:  GENERAL: NAD, lying in bed comfortably  HEAD:  Atraumatic, Normocephalic  EYES: EOMI, conjunctiva and sclera clear  ENT: Moist mucous membranes  NECK: Supple, No JVD  CHEST/LUNG: Clear to auscultation bilaterally; No rales, rhonchi, wheezing, or rubs. Unlabored respirations  HEART: regular rate and rhythm; No murmurs, rubs, or gallops  ABDOMEN: Bowel sounds present; Soft, Nontender, Nondistended.    EXTREMITIES: Warm. No clubbing, cyanosis, or edema  NERVOUS SYSTEM:  Alert & Oriented X3. No focal deficits   SKIN: No rashes or lesions                                           --------------------------------------------------------------

## 2024-07-27 NOTE — PROGRESS NOTE ADULT - ASSESSMENT
Patient is a 59 year old male with PMH of HTN and recent chronic lacunar infarct with no residual deficits diagnosed in july24 coming to the hospital for momentary episodes of trembling and shaking associated with aphasia and impaired mobility. History complicated by multiple stressors and Pt is currently depressed with active suicidal thoughts but no active plan. Pt was a stroke code in the ED but negative for stroke, neuro consulted and Pt admitted for further work up.     1. 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   - MRI Brain w/ gadolinium (7/26)- No acute intracranial pathology or abnormal enhancement. Minimal chronic microvascular type changes.  - EEG negative    - f/u B12 1871, folate 14, TSH 0.56    - f/u UTox (7/15) negative, UA negative, Urine drug screen (7/26) negative  PLAN:   - Neuro recommending  LP but patient refusing  - on constant observation   - Continue with Asa/Statin/Amlodipine   - Psychiatry consultation 7/26 -  appointment at the [facility] psychiatry [facility] next Tuesday.                                              - There is no acute indication to start any psychotropic medication for now                                              -  recommends melatonin 5 –10 mg P.O bedtime to regulate sleep wake cycle      #DARREN - resolved   - Creat 1.6 - > 1.0  - could be due to poor PO intake/dehydration  - encouraged PO hydration     #Transamnitis  - Pt has mild elevated LFTs  - Denies any drug/alcohol use   - Continue to trend     - DVT prophy - heparin sub q  - Gi prophy - not indicated

## 2024-07-27 NOTE — PROGRESS NOTE ADULT - ASSESSMENT
Patient is a 59 year old male with PMH of HTN and recent chronic lacunar infarct with no residual deficits diagnosed in july24 coming to the hospital for momentary episodes of trembling and shaking associated with aphasia and impaired mobility. History complicated by multiple stressors and Pt is currently depressed with active suicidal thoughts but no active plan. Pt was a stroke code in the ED but negative for stroke, neuro consulted and Pt admitted for further work up.     Today: patient accepting to do LP, f/u neuro recs    1. 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 Minimal chronic microvascular type changes.  -EEG negative   -  medical w/u for encephalopathy - Neuro recommending LP but patient refusing on  (7/26)  - low suspicion for TME   - f/u UTox, UA, Urine drug screen- all negative  - Urgent psych eval, informed psych and was asked to call back in the am - please call psych([phone]) in the am.   - Psychiatry consulted (7/26) -  As per psychiatry- no acute indication to start any psychotropic medication for now ,recommends melatonin 5 –10 mg P.O bedtime to regulate sleep wake cycle   - on constant observation   - f/u B12 1871, folate 14, TSH 0.56    - Continue with Asa/Statin/Amlodipine   - F/U neuro after LP       #DARREN - resolved   - Creat 1.6 - > 1.0  - could be due to poor PO intake/dehydration  - encouraged PO hydration     #Transamnitis  - Pt has mild elevated LFTs  - Denies any drug/alcohol use   - Continue to trend         - DVT prophy - heparin sub q  - Gi prophy - not indicated    -Pending :    F/U Neuro, LP

## 2024-07-27 NOTE — PROGRESS NOTE ADULT - SUBJECTIVE AND OBJECTIVE BOX
JULIO CÉSAR DURANT 59y Male  MRN#: [id number]     Hospital Day: 2d    Pt is currently admitted with the primary diagnosis of  Other encephalopathy        SUBJECTIVE     Overnight events  None    Subjective complaints  Pt was evaluated this am. Patient denied any active complaints and per patient his symptoms are improving                                            ----------------------------------------------------------  OBJECTIVE  PAST MEDICAL & SURGICAL HISTORY                                            -----------------------------------------------------------  ALLERGIES:  No Known Allergies                                            ------------------------------------------------------------    HOME MEDICATIONS  Home Medications:  aspirin 81 mg oral tablet: 1 tab(s) orally once a day (25 Jul 2024 19:47)  atorvastatin 10 mg oral tablet: 1 tab(s) orally once a day (at bedtime) (25 Jul 2024 19:47)  Norvasc 5 mg oral tablet: 1 tab(s) orally once a day (25 Jul 2024 19:47)                           MEDICATIONS:  STANDING MEDICATIONS  amLODIPine   Tablet 5 milliGRAM(s) Oral daily  aspirin enteric coated 81 milliGRAM(s) Oral daily  atorvastatin 10 milliGRAM(s) Oral at bedtime  heparin   Injectable 5000 Unit(s) SubCutaneous every 8 hours  sodium chloride 0.9%. 1000 milliLiter(s) IV Continuous <Continuous>    PRN MEDICATIONS                                            ------------------------------------------------------------  VITAL SIGNS: Last 24 Hours  T(C): 36.8 (27 Jul 2024 05:00), Max: 36.8 (27 Jul 2024 05:00)  T(F): 98.2 (27 Jul 2024 05:00), Max: 98.2 (27 Jul 2024 05:00)  HR: 81 (27 Jul 2024 05:00) (73 - 81)  BP: 133/85 (27 Jul 2024 05:00) (116/73 - 133/85)  BP(mean): --  RR: 18 (27 Jul 2024 05:00) (18 - 18)  SpO2: 97% (27 Jul 2024 05:00) (97% - 98%)      07-26-24 @ 07:01  -  07-27-24 @ 07:00  --------------------------------------------------------  IN: 900 mL / OUT: 870 mL / NET: 30 mL                                             --------------------------------------------------------------  LABS:                        14.4   3.90  )-----------( 260      ( 27 Jul 2024 08:43 )             42.7     07-27    138  |  103  |  12  ----------------------------<  94  4.3   |  24  |  1.1    Ca    9.0      27 Jul 2024 08:43    TPro  6.6  /  Alb  4.3  /  TBili  0.8  /  DBili  x   /  AST  61<H>  /  ALT  39  /  AlkPhos  60  07-26      Urinalysis Basic - ( 27 Jul 2024 08:43 )    Color: x / Appearance: x / SG: x / pH: x  Gluc: 94 mg/dL / Ketone: x  / Bili: x / Urobili: x   Blood: x / Protein: x / Nitrite: x   Leuk Esterase: x / RBC: x / WBC x   Sq Epi: x / Non Sq Epi: x / Bacteria: x                                              -------------------------------------------------------------  RADIOLOGY:    CT Angio Brain Stroke Protocol  w/ IV Cont (07.25.24 @ 00:52) >  CT PERFUSION:  No core infarct or acute ischemic penumbra.    CT ANGIOGRAPHY BRAIN:  No large vessel occlusion, significant stenosis, aneurysm or vascular   malformation.    CT ANGIOGRAPHY NECK:  Vasculature of the neck is patent without significant stenosis or   dissection.     MR Head No Cont (07.25.24 @ 11:39) >  1.  No acute infarct or intracranial hemorrhage. Stable exam.    2.  Stable minimal chronic microvascular changes.    3.  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                                            --------------------------------------------------------------    PHYSICAL EXAM:  GENERAL: NAD, lying in bed comfortably  HEAD:  Atraumatic, Normocephalic  EYES: EOMI, conjunctiva and sclera clear  ENT: Moist mucous membranes  NECK: Supple, No JVD  CHEST/LUNG: Clear to auscultation bilaterally; No rales, rhonchi, wheezing, or rubs. Unlabored respirations  HEART: regular rate and rhythm; No murmurs, rubs, or gallops  ABDOMEN: Bowel sounds present; Soft, Nontender, Nondistended.    EXTREMITIES: Warm. No clubbing, cyanosis, or edema  NERVOUS SYSTEM:  Alert & Oriented X3. No focal deficits   SKIN: No rashes or lesions                                           --------------------------------------------------------------

## 2024-07-28 LAB
ANION GAP SERPL CALC-SCNC: 14 MMOL/L — SIGNIFICANT CHANGE UP (ref 7–14)
BUN SERPL-MCNC: 6 MG/DL — LOW (ref 10–20)
CALCIUM SERPL-MCNC: 7.7 MG/DL — LOW (ref 8.4–10.5)
CHLORIDE SERPL-SCNC: 109 MMOL/L — SIGNIFICANT CHANGE UP (ref 98–110)
CO2 SERPL-SCNC: 21 MMOL/L — SIGNIFICANT CHANGE UP (ref 17–32)
CREAT SERPL-MCNC: 0.8 MG/DL — SIGNIFICANT CHANGE UP (ref 0.7–1.5)
EGFR: 101 ML/MIN/1.73M2 — SIGNIFICANT CHANGE UP
GLUCOSE SERPL-MCNC: 93 MG/DL — SIGNIFICANT CHANGE UP (ref 70–99)
HCT VFR BLD CALC: 39.8 % — LOW (ref 42–52)
HGB BLD-MCNC: 13.3 G/DL — LOW (ref 14–18)
MCHC RBC-ENTMCNC: 33.1 PG — HIGH (ref 27–31)
MCHC RBC-ENTMCNC: 33.4 G/DL — SIGNIFICANT CHANGE UP (ref 32–37)
MCV RBC AUTO: 99 FL — HIGH (ref 80–94)
NRBC # BLD: 0 /100 WBCS — SIGNIFICANT CHANGE UP (ref 0–0)
PLATELET # BLD AUTO: 234 K/UL — SIGNIFICANT CHANGE UP (ref 130–400)
PMV BLD: 9 FL — SIGNIFICANT CHANGE UP (ref 7.4–10.4)
POTASSIUM SERPL-MCNC: 3.8 MMOL/L — SIGNIFICANT CHANGE UP (ref 3.5–5)
POTASSIUM SERPL-SCNC: 3.8 MMOL/L — SIGNIFICANT CHANGE UP (ref 3.5–5)
RBC # BLD: 4.02 M/UL — LOW (ref 4.7–6.1)
RBC # FLD: 13 % — SIGNIFICANT CHANGE UP (ref 11.5–14.5)
SODIUM SERPL-SCNC: 144 MMOL/L — SIGNIFICANT CHANGE UP (ref 135–146)
WBC # BLD: 4.22 K/UL — LOW (ref 4.8–10.8)
WBC # FLD AUTO: 4.22 K/UL — LOW (ref 4.8–10.8)

## 2024-07-28 PROCEDURE — 99232 SBSQ HOSP IP/OBS MODERATE 35: CPT

## 2024-07-28 RX ORDER — ESCITALOPRAM OXALATE 20 MG
5 TABLET ORAL DAILY
Refills: 0 | Status: DISCONTINUED | OUTPATIENT
Start: 2024-07-28 | End: 2024-07-30

## 2024-07-28 RX ADMIN — Medication 75 MILLILITER(S): at 05:49

## 2024-07-28 RX ADMIN — Medication 75 MILLILITER(S): at 21:08

## 2024-07-28 RX ADMIN — HEPARIN SODIUM 5000 UNIT(S): 1000 INJECTION, SOLUTION INTRAVENOUS; SUBCUTANEOUS at 21:08

## 2024-07-28 RX ADMIN — HEPARIN SODIUM 5000 UNIT(S): 1000 INJECTION, SOLUTION INTRAVENOUS; SUBCUTANEOUS at 13:44

## 2024-07-28 RX ADMIN — HEPARIN SODIUM 5000 UNIT(S): 1000 INJECTION, SOLUTION INTRAVENOUS; SUBCUTANEOUS at 05:49

## 2024-07-28 RX ADMIN — ATORVASTATIN CALCIUM 10 MILLIGRAM(S): 40 TABLET, FILM COATED ORAL at 21:08

## 2024-07-28 RX ADMIN — Medication 5 MILLIGRAM(S): at 13:45

## 2024-07-28 RX ADMIN — Medication 75 MILLILITER(S): at 08:33

## 2024-07-28 RX ADMIN — AMLODIPINE BESYLATE 5 MILLIGRAM(S): 2.5 TABLET ORAL at 05:49

## 2024-07-28 RX ADMIN — Medication 81 MILLIGRAM(S): at 11:36

## 2024-07-28 NOTE — PROGRESS NOTE ADULT - SUBJECTIVE AND OBJECTIVE BOX
CARLEEN, JULIO CÉSAR  59y, Male  Allergy: No Known Allergies    Hospital Day: 3d    Patient seen and examined earlier today.  No acute events overnight.     PMH/PSH:  PAST MEDICAL & SURGICAL HISTORY:      LAST 24-Hr EVENTS:    VITALS:  T(F): 97.7 (07-28-24 @ 12:58), Max: 98.4 (07-27-24 @ 14:05)  HR: 84 (07-28-24 @ 12:58)  BP: 152/88 (07-27-24 @ 19:53) (124/85 - 152/88)  RR: 16 (07-28-24 @ 12:58)  SpO2: 96% (07-28-24 @ 12:58)          TESTS & MEASUREMENTS:  Weight/BMI  71.4 (07-26-24 @ 03:25)  23.9 (07-26-24 @ 03:25)    07-26-24 @ 07:01  -  07-27-24 @ 07:00  --------------------------------------------------------  IN: 900 mL / OUT: 870 mL / NET: 30 mL                            13.3   4.22  )-----------( 234      ( 28 Jul 2024 09:45 )             39.8       INR: 1.01 ratio (07-25-24 @ 00:26)    07-28    144  |  109  |  6<L>  ----------------------------<  93  3.8   |  21  |  0.8    Ca    7.7<L>      28 Jul 2024 09:45              Culture - Blood (collected 07-26-24 @ 13:15)  Source: .Blood Blood-Peripheral  Preliminary Report (07-27-24 @ 22:02):    No growth at 24 hours      Urinalysis Basic - ( 28 Jul 2024 09:45 )    Color: x / Appearance: x / SG: x / pH: x  Gluc: 93 mg/dL / Ketone: x  / Bili: x / Urobili: x   Blood: x / Protein: x / Nitrite: x   Leuk Esterase: x / RBC: x / WBC x   Sq Epi: x / Non Sq Epi: x / Bacteria: x                    A1C with Estimated Average Glucose Result: 5.6 % (07-05-24 @ 12:37)          RADIOLOGY, ECG, & ADDITIONAL TESTS:  12 Lead ECG:   Ventricular Rate 72 BPM    Atrial Rate 72 BPM    P-R Interval 102 ms    QRS Duration 80 ms    Q-T Interval 372 ms    QTC Calculation(Bazett) 407 ms    P Axis 76 degrees    R Axis 5 degrees    T Axis 67 degrees    Diagnosis Line Sinus rhythm with short DC  Borderline ECG    Confirmed by Gene Nelson ([id number]) on 7/25/2024 8:14:31 AM (07-25-24 @ 06:42)      RECENT DIAGNOSTIC ORDERS:      MEDICATIONS:  MEDICATIONS  (STANDING):  amLODIPine   Tablet 5 milliGRAM(s) Oral daily  aspirin enteric coated 81 milliGRAM(s) Oral daily  atorvastatin 10 milliGRAM(s) Oral at bedtime  heparin   Injectable 5000 Unit(s) SubCutaneous every 8 hours  sodium chloride 0.9%. 1000 milliLiter(s) (75 mL/Hr) IV Continuous <Continuous>    MEDICATIONS  (PRN):      HOME MEDICATIONS:  aspirin 81 mg oral tablet (07-25)  Aspirin Low Dose 81 mg oral delayed release tablet (07-05)  atorvastatin 10 mg oral tablet (07-25)  Norvasc 5 mg oral tablet (07-25)      PHYSICAL EXAM:  GENERAL: NAD, lying in bed comfortably  HEAD:  Atraumatic, Normocephalic  EYES: conjunctiva and sclera clear  ENT: Moist mucous membranes  NECK: Supple, No JVD  CHEST/LUNG: Unlabored respirations, CTAB  HEART: regular rate and rhythm  ABDOMEN: , Nondistended.    EXTREMITIES: Warm. No clubbing, cyanosis, or edema  NERVOUS SYSTEM:  Alert & Oriented X3. No focal deficits   SKIN: No rashes or lesions

## 2024-07-28 NOTE — PROGRESS NOTE ADULT - SUBJECTIVE AND OBJECTIVE BOX
SUBJECTIVE/OVERNIGHT EVENTS  Today is hospital day 3d. This morning patient was seen and examined at bedside, resting comfortably in bed. No acute or major events overnight.    HOSPITAL COURSE  Day 1:   Day 2:   Day 3:     CODE STATUS:    FAMILY COMMUNICATION  Contact date:  Name of person contacted:  Relationship to patient:  Communication details:    MEDICATIONS  STANDING MEDICATIONS  amLODIPine   Tablet 5 milliGRAM(s) Oral daily  aspirin enteric coated 81 milliGRAM(s) Oral daily  atorvastatin 10 milliGRAM(s) Oral at bedtime  escitalopram 5 milliGRAM(s) Oral daily  heparin   Injectable 5000 Unit(s) SubCutaneous every 8 hours  sodium chloride 0.9%. 1000 milliLiter(s) IV Continuous <Continuous>    PRN MEDICATIONS    VITALS  T(F): 97.7 (07-28-24 @ 12:58), Max: 97.7 (07-28-24 @ 12:58)  HR: 84 (07-28-24 @ 12:58) (84 - 84)  BP: 175/107 (07-28-24 @ 18:20) (143/95 - 175/107)  RR: 16 (07-28-24 @ 12:58) (16 - 16)  SpO2: 96% (07-28-24 @ 12:58) (96% - 96%)    PHYSICAL EXAM  GENERAL  (  ) NAD, lying in bed comfortably     (  ) obtunded     (  ) lethargic     (  ) somnolent    HEAD  (  ) Atraumatic     (  ) hematoma     (  ) laceration (specify location:       )     NECK  (  ) Supple     (  ) neck stiffness     (  ) nuchal rigidity     (  )  no JVD     (  ) JVD present ( -- cm)    HEART  Rate -->  (  ) normal rate    (  ) bradycardic    (  ) tachycardic  Rhythm -->  (  ) regular    (  ) regularly irregular    (  ) irregularly irregular  Murmurs -->  (  ) normal s1/s2    (  ) systolic murmur    (  ) diastolic murmur    (  ) continuous murmur     (  ) S3 present    (  ) S4 present    LUNGS  (  )Unlabored respirations     (  ) tachypnea  (  ) B/L air entry     (  ) decreased breath sounds in:  (location     )    (  ) no adventitious sound     (  ) crackles     (  ) wheezing      (  ) rhonchi      (specify location:       )  (  ) chest wall tenderness (specify location:       )    ABDOMEN  (  ) Soft     (  ) tense   |   (  ) nondistended     (  ) distended   |   (  ) +BS     (  ) hypoactive bowel sounds     (  ) hyperactive bowel sounds  (  ) nontender     (  ) RUQ tenderness     (  ) RLQ tenderness     (  ) LLQ tenderness     (  ) epigastric tenderness     (  ) diffuse tenderness  (  ) Splenomegaly      (  ) Hepatomegaly      (  ) Jaundice     (  ) ecchymosis     EXTREMITIES  (  ) Normal     (  ) Rash     (  ) ecchymosis     (  ) varicose veins      (  ) pitting edema     (  ) non-pitting edema   (  ) ulceration     (  ) gangrene:     (location:     )    NERVOUS SYSTEM  (  ) A&Ox3     (  ) confused     (  ) lethargic  CN II-XII:     (  ) Intact     (  ) focal deficits  (Specify:     )   Upper extremities:     (  ) strength X/5     (  ) focal deficit (specify:    )  Lower extremities:     (  ) strength  X/5    (  ) focal deficit (specify:    )    SKIN  (  ) No rashes or lesions     (  ) maculopapular rash     (  ) pustules     (  ) vesicles     (  ) ulcer     (  ) ecchymosis     (specify location:     )    (  ) Indwelling Mercer Catheter   Date insterted:    Reason (  ) Critical illness     (  ) urinary retention    (  ) Accurate Ins/Outs Monitoring     (  ) CMO patient    (  ) Central Line  Date inserted:  Location: (  ) Right IJ   (  ) Left IJ   (  ) Right Fem   (  ) Left Fem    (  ) SPC  (  ) pigtail  (  ) PEG tube  (  ) colostomy  (  ) jejunostomy  (  ) U-Dall    LABS             13.3   4.22  )-----------( 234      ( 07-28-24 @ 09:45 )             39.8     144  |  109  |  6   -------------------------<  93   07-28-24 @ 09:45  3.8  |  21  |  0.8    Ca      7.7     07-28-24 @ 09:45        Troponin T, High Sensitivity Result: 23 ng/L (07-26-24 @ 00:24)    Urinalysis Basic - ( 28 Jul 2024 09:45 )    Color: x / Appearance: x / SG: x / pH: x  Gluc: 93 mg/dL / Ketone: x  / Bili: x / Urobili: x   Blood: x / Protein: x / Nitrite: x   Leuk Esterase: x / RBC: x / WBC x   Sq Epi: x / Non Sq Epi: x / Bacteria: x          Culture - Blood (collected 26 Jul 2024 13:15)  Source: .Blood Blood-Peripheral  Preliminary Report (27 Jul 2024 22:02):    No growth at 24 hours      IMAGING

## 2024-07-28 NOTE — PROGRESS NOTE ADULT - ASSESSMENT
Patient is a 59 year old male with PMH of HTN and recent chronic lacunar infarct with no residual deficits diagnosed in july24 coming to the hospital for momentary episodes of trembling and shaking associated with aphasia and impaired mobility. History complicated by multiple stressors and Pt is currently depressed with active suicidal thoughts but no active plan. Pt was a stroke code in the ED but negative for stroke, neuro consulted and Pt admitted for further work up.     Today: patient accepting to do LP, f/u neuro recs    1. 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 Minimal chronic microvascular type changes.  -EEG negative   -  medical w/u for encephalopathy - Neuro recommending LP   - low suspicion for TME   - UTox, UA, Urine drug screen- all negative  - Psychiatry consulted (7/26) -  As per psychiatry- no acute indication to start any psychotropic medication for now ,recommends melatonin 5 –10 mg P.O bedtime to regulate sleep wake cycle - wife wanting to start Psych meds - start Home lexapro  - on constant observation   - B12 1871, folate 14, TSH 0.56    - Continue with Asa/Statin/Amlodipine   - F/U neuro after LP  - will need to get in AM with procedure team      #DARREN - resolved   - Creat 1.6 - > 1.0  - could be due to poor PO intake/dehydration  - encouraged PO hydration     #Transamnitis - improved  - Pt has mild elevated LFTs  - Denies any drug/alcohol use   - Continue to trend         - DVT prophy - heparin sub q  - Gi prophy - not indicated    -Pending :    F/U Neuro, LP

## 2024-07-29 LAB
AMMONIA BLD-MCNC: 12 UMOL/L — SIGNIFICANT CHANGE UP (ref 11–55)
ANION GAP SERPL CALC-SCNC: 16 MMOL/L — HIGH (ref 7–14)
BUN SERPL-MCNC: 9 MG/DL — LOW (ref 10–20)
CALCIUM SERPL-MCNC: 9.4 MG/DL — SIGNIFICANT CHANGE UP (ref 8.4–10.5)
CHLORIDE SERPL-SCNC: 100 MMOL/L — SIGNIFICANT CHANGE UP (ref 98–110)
CO2 SERPL-SCNC: 22 MMOL/L — SIGNIFICANT CHANGE UP (ref 17–32)
CREAT SERPL-MCNC: 0.9 MG/DL — SIGNIFICANT CHANGE UP (ref 0.7–1.5)
EGFR: 98 ML/MIN/1.73M2 — SIGNIFICANT CHANGE UP
GLUCOSE SERPL-MCNC: 121 MG/DL — HIGH (ref 70–99)
HCT VFR BLD CALC: 43 % — SIGNIFICANT CHANGE UP (ref 42–52)
HGB BLD-MCNC: 14.3 G/DL — SIGNIFICANT CHANGE UP (ref 14–18)
MCHC RBC-ENTMCNC: 33.3 G/DL — SIGNIFICANT CHANGE UP (ref 32–37)
MCHC RBC-ENTMCNC: 33.4 PG — HIGH (ref 27–31)
MCV RBC AUTO: 100.5 FL — HIGH (ref 80–94)
NRBC # BLD: 0 /100 WBCS — SIGNIFICANT CHANGE UP (ref 0–0)
PLATELET # BLD AUTO: 268 K/UL — SIGNIFICANT CHANGE UP (ref 130–400)
PMV BLD: 9.2 FL — SIGNIFICANT CHANGE UP (ref 7.4–10.4)
POTASSIUM SERPL-MCNC: 4.4 MMOL/L — SIGNIFICANT CHANGE UP (ref 3.5–5)
POTASSIUM SERPL-SCNC: 4.4 MMOL/L — SIGNIFICANT CHANGE UP (ref 3.5–5)
RBC # BLD: 4.28 M/UL — LOW (ref 4.7–6.1)
RBC # FLD: 13.2 % — SIGNIFICANT CHANGE UP (ref 11.5–14.5)
SODIUM SERPL-SCNC: 138 MMOL/L — SIGNIFICANT CHANGE UP (ref 135–146)
WBC # BLD: 5.31 K/UL — SIGNIFICANT CHANGE UP (ref 4.8–10.8)
WBC # FLD AUTO: 5.31 K/UL — SIGNIFICANT CHANGE UP (ref 4.8–10.8)

## 2024-07-29 PROCEDURE — 76705 ECHO EXAM OF ABDOMEN: CPT | Mod: 26

## 2024-07-29 PROCEDURE — 99232 SBSQ HOSP IP/OBS MODERATE 35: CPT

## 2024-07-29 RX ORDER — AMLODIPINE BESYLATE 2.5 MG/1
10 TABLET ORAL DAILY
Refills: 0 | Status: DISCONTINUED | OUTPATIENT
Start: 2024-07-29 | End: 2024-08-02

## 2024-07-29 RX ADMIN — HEPARIN SODIUM 5000 UNIT(S): 1000 INJECTION, SOLUTION INTRAVENOUS; SUBCUTANEOUS at 13:23

## 2024-07-29 RX ADMIN — Medication 81 MILLIGRAM(S): at 11:36

## 2024-07-29 RX ADMIN — AMLODIPINE BESYLATE 10 MILLIGRAM(S): 2.5 TABLET ORAL at 11:37

## 2024-07-29 RX ADMIN — HEPARIN SODIUM 5000 UNIT(S): 1000 INJECTION, SOLUTION INTRAVENOUS; SUBCUTANEOUS at 05:04

## 2024-07-29 RX ADMIN — AMLODIPINE BESYLATE 5 MILLIGRAM(S): 2.5 TABLET ORAL at 05:04

## 2024-07-29 RX ADMIN — HEPARIN SODIUM 5000 UNIT(S): 1000 INJECTION, SOLUTION INTRAVENOUS; SUBCUTANEOUS at 22:09

## 2024-07-29 RX ADMIN — Medication 75 MILLILITER(S): at 05:03

## 2024-07-29 RX ADMIN — Medication 75 MILLILITER(S): at 22:08

## 2024-07-29 RX ADMIN — ATORVASTATIN CALCIUM 10 MILLIGRAM(S): 40 TABLET, FILM COATED ORAL at 22:09

## 2024-07-29 RX ADMIN — Medication 75 MILLILITER(S): at 13:55

## 2024-07-29 RX ADMIN — Medication 5 MILLIGRAM(S): at 11:36

## 2024-07-29 NOTE — PROGRESS NOTE ADULT - ASSESSMENT
59 year old gentleman with PMH of HTN and recent depression/anxiety diagnosis. Recent negative MRI brain in July 2024. Patient went missing for 8 hours as per his wife. Was home and himself for several hours throughout the day but at 2000 was watching a sporting event on TV and began shaking his right hand and stopped speaking to his wife. Stroke code in ED. Patients family reported inconsistency in patients compliance with escitalopram. Patients exam is inconsistent. Smile symmetry is inconsistent, right hand shaking and alertness is inconsistent. Patient out of the window for IV thrombolytics, CTA without LVO not a candidate for IA intervention. Etiology of symptoms less likely neurovascular in nature, rule out epileptiform cause.     7/26: MRH Minimal chronic microvascular type changes  7/26 EEG: Normal  B12, folate, TSH normal    Recommendation:  - Management by primary team  - Can follow outpatient in neurology clinic  - If any neurological worsening or any further question, please contact back neurology    Case discussed with General Neurology attending Dr. Rogers.

## 2024-07-29 NOTE — PROGRESS NOTE ADULT - SUBJECTIVE AND OBJECTIVE BOX
NEUROLOGY CONSULT PROGRESS NOTE    INTERVAL HISTORY:      REVIEW OF SYSTEMS:  As per HPI, otherwise negative for Constitutional, Eyes, Ears/Nose/Mouth/Throat, Neck, Cardiovascular, Respiratory, Gastrointestinal, Genitourinary, Skin, Endocrine, Musculoskeletal, Psychiatric, and Hematologic/Lymphatic.    MEDICATIONS:  amLODIPine   Tablet 10 milliGRAM(s) Oral daily  aspirin enteric coated 81 milliGRAM(s) Oral daily  atorvastatin 10 milliGRAM(s) Oral at bedtime  escitalopram 5 milliGRAM(s) Oral daily  heparin   Injectable 5000 Unit(s) SubCutaneous every 8 hours  sodium chloride 0.9%. 1000 milliLiter(s) IV Continuous <Continuous>    VITAL SIGNS:  Vital Signs Last 24 Hrs  T(C): 36 (29 Jul 2024 09:02), Max: 36.7 (28 Jul 2024 21:00)  T(F): 96.8 (29 Jul 2024 09:02), Max: 98.1 (28 Jul 2024 21:00)  HR: 84 (29 Jul 2024 11:38) (82 - 108)  BP: 129/80 (29 Jul 2024 11:38) (129/80 - 175/107)  BP(mean): --  RR: 19 (29 Jul 2024 11:38) (18 - 19)  SpO2: 96% (29 Jul 2024 11:38) (96% - 98%)    Parameters below as of 29 Jul 2024 11:38  Patient On (Oxygen Delivery Method): room air    PHYSICAL EXAMINATION:  Constitutional: WDWN; NAD  Eyes: anicteric sclera  Cardiovascular: +S1/S2, RRR; no M/R/G  Examination:  Cognitive/Language: Alert with encouragement. The patient is oriented to person, in place, time and date.  Recent and remote memory intact.  Fund of knowledge is intact and normal.  Language with normal repetition, comprehension and naming.  Nondysarthric.    Eyes: intact VA, VFF.  EOMI w/o nystagmus, skew or reported double vision.  PERRL.   Face:  Facial sensation normal V1 - 3, no facial asymmetry.    No cogwheeling, no increased tone.   Inconsistent course right arm shaking.   Formal Muscle Strength Testing: (MRC grade R/L) 5/5 UE; 5/5 LE.  No observable drift.  Reflexes:   2+ b/l pectoralis, biceps, triceps, brachioradialis, patella and Achilles.  Plantar response downgoing b/l.  Jaw jerk, Kyle, clonus absent.  Sensory examination:   Intact to light touch in all extremities.  Cerebellum:   FTN/HKS intact with normal AIDAN in all limbs.  No dysmetria or dysdiadokinesia.      NIHSS 0  mrs 0      LABS:                          14.3   5.31  )-----------( 268      ( 29 Jul 2024 10:25 )             43.0     07-29    138  |  100  |  9<L>  ----------------------------<  121<H>  4.4   |  22  |  0.9    Ca    9.4      29 Jul 2024 10:25        Urinalysis Basic - ( 29 Jul 2024 10:25 )    Color: x / Appearance: x / SG: x / pH: x  Gluc: 121 mg/dL / Ketone: x  / Bili: x / Urobili: x   Blood: x / Protein: x / Nitrite: x   Leuk Esterase: x / RBC: x / WBC x   Sq Epi: x / Non Sq Epi: x / Bacteria: x        RADIOLOGY & ADDITIONAL STUDIES:      IMPRESSION & RECOMMENDATIONS:   NEUROLOGY CONSULT PROGRESS NOTE    INTERVAL HISTORY: Patient improving from his previous symptoms. Lexapro well tolerated. Neurological exam normal    REVIEW OF SYSTEMS:  As per HPI, otherwise negative for Constitutional, Eyes, Ears/Nose/Mouth/Throat, Neck, Cardiovascular, Respiratory, Gastrointestinal, Genitourinary, Skin, Endocrine, Musculoskeletal, Psychiatric, and Hematologic/Lymphatic.    MEDICATIONS:  amLODIPine   Tablet 10 milliGRAM(s) Oral daily  aspirin enteric coated 81 milliGRAM(s) Oral daily  atorvastatin 10 milliGRAM(s) Oral at bedtime  escitalopram 5 milliGRAM(s) Oral daily  heparin   Injectable 5000 Unit(s) SubCutaneous every 8 hours  sodium chloride 0.9%. 1000 milliLiter(s) IV Continuous <Continuous>    VITAL SIGNS:  Vital Signs Last 24 Hrs  T(C): 36 (29 Jul 2024 09:02), Max: 36.7 (28 Jul 2024 21:00)  T(F): 96.8 (29 Jul 2024 09:02), Max: 98.1 (28 Jul 2024 21:00)  HR: 84 (29 Jul 2024 11:38) (82 - 108)  BP: 129/80 (29 Jul 2024 11:38) (129/80 - 175/107)  BP(mean): --  RR: 19 (29 Jul 2024 11:38) (18 - 19)  SpO2: 96% (29 Jul 2024 11:38) (96% - 98%)    Parameters below as of 29 Jul 2024 11:38  Patient On (Oxygen Delivery Method): room air    PHYSICAL EXAMINATION:  Constitutional: WDWN; NAD  Eyes: anicteric sclera  Cardiovascular: +S1/S2, RRR; no M/R/G  Examination:  Cognitive/Language: Alert with encouragement. The patient is oriented to person, in place, time and date.  Recent and remote memory intact.  Fund of knowledge is intact and normal.  Language with normal repetition, comprehension and naming.  Nondysarthric.    Eyes: intact VA, VFF.  EOMI w/o nystagmus, skew or reported double vision.  PERRL.   Face:  Facial sensation normal V1 - 3, no facial asymmetry.    No cogwheeling, no increased tone.   Inconsistent course right arm shaking.   Formal Muscle Strength Testing: (MRC grade R/L) 5/5 UE; 5/5 LE.  No observable drift.  Reflexes:   2+ b/l pectoralis, biceps, triceps, brachioradialis, patella and Achilles.  Plantar response downgoing b/l.  Jaw jerk, Kyle, clonus absent.  Sensory examination:   Intact to light touch in all extremities.  Cerebellum:   FTN/HKS intact with normal AIDAN in all limbs.  No dysmetria or dysdiadokinesia.      NIHSS 0  mrs 0      LABS:                          14.3   5.31  )-----------( 268      ( 29 Jul 2024 10:25 )             43.0     07-29    138  |  100  |  9<L>  ----------------------------<  121<H>  4.4   |  22  |  0.9    Ca    9.4      29 Jul 2024 10:25        Urinalysis Basic - ( 29 Jul 2024 10:25 )    Color: x / Appearance: x / SG: x / pH: x  Gluc: 121 mg/dL / Ketone: x  / Bili: x / Urobili: x   Blood: x / Protein: x / Nitrite: x   Leuk Esterase: x / RBC: x / WBC x   Sq Epi: x / Non Sq Epi: x / Bacteria: x        RADIOLOGY & ADDITIONAL STUDIES:      IMPRESSION & RECOMMENDATIONS:

## 2024-07-29 NOTE — PROGRESS NOTE ADULT - ASSESSMENT
Patient is a 59 year old male with PMH of HTN and recent chronic lacunar infarct with no residual deficits diagnosed in July 2024 presenting with episodes of trembling and shaking associated with aphasia and impaired mobility. History complicated by multiple stressors and Pt is currently depressed with suicidal thoughts but no active plan. Pt was a stroke code in the ED but negative for stroke, neuro consulted and Pt admitted for further work up.     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     Active suicidal ideation      Uncontrolled depression    ?Conversion disorder - aphasia  - patient voiced active suicidal ideation on admission   - on constant observation   - UTox, UA, Urine drug screen- all negative  - B12 1871, folate 14, TSH 0.56- all wnl    - f/u VDRL, RPR  - Continue with Asa/Statin/Amlodipine   - Psychiatry consulted (7/26), recs appreciated       - no acute indication to start any psychotropic medication for now       - c/w melatonin 5 –10 mg P.O bedtime to regulate sleep wake cycle       - c/w home lexapro  - Neuro c/s to r/o stroke- recs appreciated       - MRI Brain w/ gadolinium - No acute intracranial pathology or abnormal enhancement. Minimal chronic microvascular type changes.       -EEG negative        -  f/u LP to r/o encephalopathy     #DARREN - resolved   - Creat 1.6--> 1.0--> 0.8  - possibly 2/2 poor PO intake/dehydration  - encouraged PO hydration     #Transaminitis - improved  - Pt had mild elevated LFTs on admission, trending down  - Denies any drug/alcohol use   - Continue to trend   - F/u RUQ US  - F/u ammonia level    # Misc  - DVT prophy - heparin sub q  - Gi prophy - not indicated    -Pending :    F/U Neuro, LP, RUQ US, ammonia level, RPR, VDRL Patient is a 59 year old male with PMH of HTN and recent chronic lacunar infarct with no residual deficits diagnosed in July 2024 presenting with episodes of trembling and shaking associated with aphasia and impaired mobility. History complicated by multiple stressors and Pt is currently depressed with suicidal thoughts but no active plan. Pt was a stroke code in the ED but negative for stroke, neuro consulted and Pt admitted for further work up.     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     Active suicidal ideation      Uncontrolled depression    ?Conversion disorder - aphasia  - patient voiced active suicidal ideation on admission   - on constant observation   - UTox, UA, Urine drug screen- all negative  - B12 1871, folate 14, TSH 0.56- all wnl    - f/u VDRL, RPR  - Continue with Asa/Statin/Amlodipine   - Psychiatry consulted (7/26), recs appreciated       - no acute indication to start any psychotropic medication for now       - c/w melatonin 5 –10 mg P.O bedtime to regulate sleep wake cycle       - c/w home lexapro  - Neuro c/s to r/o stroke- recs appreciated       - MRI Brain w/ gadolinium - No acute intracranial pathology or abnormal enhancement. Minimal chronic microvascular type changes.       -EEG negative        -  f/u LP to r/o encephalopathy     #DARREN - resolved   - Creat 1.6--> 1.0--> 0.8  - possibly 2/2 poor PO intake/dehydration  - encouraged PO hydration     #Transaminitis - improved  - Pt had mild elevated LFTs on admission, trending down  - Denies any drug/alcohol use   - Continue to trend   - F/u RUQ US  - Ammonia level wnl    # Misc  - DVT prophy - heparin sub q  - Gi prophy - not indicated    -Pending :    F/U Neuro, LP, RUQ US, RPR, VDRL, psych Patient is a 59 year old male with PMH of HTN and recent chronic lacunar infarct with no residual deficits diagnosed in July 2024 presenting with episodes of trembling and shaking associated with aphasia and impaired mobility. History complicated by multiple stressors and Pt is currently depressed with suicidal thoughts but no active plan. Pt was a stroke code in the ED but negative for stroke, neuro consulted and Pt admitted for further work up.     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     Active suicidal ideation      Uncontrolled depression    ?Conversion disorder - aphasia  - patient voiced active suicidal ideation on admission   - on constant observation   - UTox, UA, Urine drug screen- all negative  - B12 1871, folate 14, TSH 0.56- all wnl    - f/u VDRL, RPR  - Continue with Asa/Statin/Amlodipine   - Psychiatry consulted (7/26), recs appreciated       - no acute indication to start any psychotropic medication for now       - c/w melatonin 5 –10 mg P.O bedtime to regulate sleep wake cycle       - c/w home lexapro  - Neuro c/s to r/o stroke- recs appreciated       - MRI Brain w/ gadolinium - No acute intracranial pathology or abnormal enhancement. Minimal chronic microvascular type changes.       - EEG negative        - LP no longer recommended by neuro/primary team as patient is at low risk for encephalopathy  - f/u HIV      #DARREN - resolved   - Creat 1.6--> 1.0--> 0.8  - possibly 2/2 poor PO intake/dehydration  - encouraged PO hydration     #Transaminitis - improved  - Pt had mild elevated LFTs on admission, trending down  - Denies any drug/alcohol use   - Continue to trend   - F/u RUQ US  - Ammonia level wnl    # Misc  - DVT prophy - heparin sub q  - Gi prophy - not indicated    -Pending :    F/U Neuro, HIV, RUQ US, RPR, VDRL, psych

## 2024-07-29 NOTE — PROGRESS NOTE ADULT - SUBJECTIVE AND OBJECTIVE BOX
SUBJECTIVE/OVERNIGHT EVENTS  Today is hospital day 4d. This morning patient was seen and examined at bedside, sitting in chair at bedside. No acute or major events overnight. Patient has a noted sad affect, however does not endorse any feelings of active suicidal ideation. Patient has no complaints at this time. Denies fever, chills, nausea, vomiting, diarrhea, constipation, palpitation, sob, pena, tremors, sensation loss, pain.    CODE STATUS: FULL    FAMILY COMMUNICATION  Contact date: 7/29/2024  Relationship to patient: Wife ([phone])  Communication details: Talked with patient's wife and she notes that patient became agitated yesterday when wife was leaving and she no longer feels safe for patient to be discharged home at this time. Will continue to evaluate and discuss further. Wife was updated on plan for today including LP and constant observation.    MEDICATIONS  STANDING MEDICATIONS  amLODIPine   Tablet 10 milliGRAM(s) Oral daily  aspirin enteric coated 81 milliGRAM(s) Oral daily  atorvastatin 10 milliGRAM(s) Oral at bedtime  escitalopram 5 milliGRAM(s) Oral daily  heparin   Injectable 5000 Unit(s) SubCutaneous every 8 hours  sodium chloride 0.9%. 1000 milliLiter(s) IV Continuous <Continuous>    VITALS  T(F): 96.8 (07-29-24 @ 09:02), Max: 98.1 (07-28-24 @ 21:00)  HR: 84 (07-29-24 @ 11:38) (82 - 108)  BP: 129/80 (07-29-24 @ 11:38) (129/80 - 175/107)  RR: 19 (07-29-24 @ 11:38) (16 - 19)  SpO2: 96% (07-29-24 @ 11:38) (96% - 98%)    PHYSICAL EXAM  GENERAL  ( X ) NAD, sitting in chair comfortably     (  ) obtunded     (  ) lethargic     (  ) somnolent    HEAD  ( X ) Atraumatic     (  ) hematoma     (  ) laceration (specify location:       )     NECK  ( X ) Supple     (  ) neck stiffness     (  ) nuchal rigidity     (  )  no JVD     (  ) JVD present ( -- cm)    HEART  Rate -->  ( X ) normal rate    (  ) bradycardic    (  ) tachycardic  Rhythm -->  ( X ) regular    (  ) regularly irregular    (  ) irregularly irregular  Murmurs -->  ( X ) normal s1/s2    (  ) systolic murmur    (  ) diastolic murmur    (  ) continuous murmur     (  ) S3 present    (  ) S4 present    LUNGS  ( X )Unlabored respirations     (  ) tachypnea  ( X ) B/L air entry     (  ) decreased breath sounds in:  (location     )    (  ) no adventitious sound     (  ) crackles     (  ) wheezing      (  ) rhonchi      (specify location:       )  (  ) chest wall tenderness (specify location:       )    ABDOMEN  ( X ) Soft     (  ) tense   |   ( X ) nondistended     (  ) distended   |   ( X ) +BS     (  ) hypoactive bowel sounds     (  ) hyperactive bowel sounds  ( X ) nontender     (  ) RUQ tenderness     (  ) RLQ tenderness     (  ) LLQ tenderness     (  ) epigastric tenderness     (  ) diffuse tenderness  (  ) Splenomegaly      (  ) Hepatomegaly      (  ) Jaundice     (  ) ecchymosis     EXTREMITIES  ( X ) Normal     (  ) Rash     (  ) ecchymosis     (  ) varicose veins      (  ) pitting edema     (  ) non-pitting edema   (  ) ulceration     (  ) gangrene:     (location:     )    NERVOUS SYSTEM  ( X ) A&Ox3     (  ) confused     (  ) lethargic  CN II-XII:     ( X ) Intact     (  ) focal deficits  (Specify:     )   Upper extremities:     ( X ) strength X/5     (  ) focal deficit (specify:    )  Lower extremities:     ( X ) strength  X/5    (  ) focal deficit (specify:    )    SKIN  (  ) No rashes or lesions     (  ) maculopapular rash     (  ) pustules     (  ) vesicles     (  ) ulcer     (  ) ecchymosis     ( X ) dry, flaky skin in medial chest and on R shoulder, noted to be chronic       LABS             14.3   5.31  )-----------( 268      ( 07-29-24 @ 10:25 )             43.0     144  |  109  |  6   -------------------------<  93   07-28-24 @ 09:45  3.8  |  21  |  0.8    Ca      7.7     07-28-24 @ 09:45      Urinalysis Basic - ( 28 Jul 2024 09:45 )    Color: x / Appearance: x / SG: x / pH: x  Gluc: 93 mg/dL / Ketone: x  / Bili: x / Urobili: x   Blood: x / Protein: x / Nitrite: x   Leuk Esterase: x / RBC: x / WBC x   Sq Epi: x / Non Sq Epi: x / Bacteria: x    Culture - Blood (collected 26 Jul 2024 13:15)  Source: .Blood Blood-Peripheral  Preliminary Report (28 Jul 2024 21:01):    No growth at 48 Hours     SUBJECTIVE/OVERNIGHT EVENTS  Today is hospital day 4d. This morning patient was seen and examined at bedside, sitting in chair at bedside. No acute or major events overnight. Patient has a noted sad affect, however does not endorse any feelings of active suicidal ideation. Patient has no complaints at this time. Denies fever, chills, nausea, vomiting, diarrhea, constipation, palpitation, sob, pena, tremors, sensation loss, pain.    CODE STATUS: FULL    FAMILY COMMUNICATION  Contact date: 7/29/2024  Relationship to patient: Wife ([phone])  Communication details: Talked with patient's wife and she notes that patient became agitated yesterday when wife was leaving and she no longer feels safe for patient to be discharged home at this time. Will continue to evaluate and discuss further. Wife was updated on plan for today including LP and constant observation.    Patient's sister also left phone number for contact:  Name: Kimberly Moran  #: [phone]    MEDICATIONS  STANDING MEDICATIONS  amLODIPine   Tablet 10 milliGRAM(s) Oral daily  aspirin enteric coated 81 milliGRAM(s) Oral daily  atorvastatin 10 milliGRAM(s) Oral at bedtime  escitalopram 5 milliGRAM(s) Oral daily  heparin   Injectable 5000 Unit(s) SubCutaneous every 8 hours  sodium chloride 0.9%. 1000 milliLiter(s) IV Continuous <Continuous>    VITALS  T(F): 96.8 (07-29-24 @ 09:02), Max: 98.1 (07-28-24 @ 21:00)  HR: 84 (07-29-24 @ 11:38) (82 - 108)  BP: 129/80 (07-29-24 @ 11:38) (129/80 - 175/107)  RR: 19 (07-29-24 @ 11:38) (16 - 19)  SpO2: 96% (07-29-24 @ 11:38) (96% - 98%)    PHYSICAL EXAM  GENERAL  ( X ) NAD, sitting in chair comfortably     (  ) obtunded     (  ) lethargic     (  ) somnolent    HEAD  ( X ) Atraumatic     (  ) hematoma     (  ) laceration (specify location:       )     NECK  ( X ) Supple     (  ) neck stiffness     (  ) nuchal rigidity     (  )  no JVD     (  ) JVD present ( -- cm)    HEART  Rate -->  ( X ) normal rate    (  ) bradycardic    (  ) tachycardic  Rhythm -->  ( X ) regular    (  ) regularly irregular    (  ) irregularly irregular  Murmurs -->  ( X ) normal s1/s2    (  ) systolic murmur    (  ) diastolic murmur    (  ) continuous murmur     (  ) S3 present    (  ) S4 present    LUNGS  ( X )Unlabored respirations     (  ) tachypnea  ( X ) B/L air entry     (  ) decreased breath sounds in:  (location     )    (  ) no adventitious sound     (  ) crackles     (  ) wheezing      (  ) rhonchi      (specify location:       )  (  ) chest wall tenderness (specify location:       )    ABDOMEN  ( X ) Soft     (  ) tense   |   ( X ) nondistended     (  ) distended   |   ( X ) +BS     (  ) hypoactive bowel sounds     (  ) hyperactive bowel sounds  ( X ) nontender     (  ) RUQ tenderness     (  ) RLQ tenderness     (  ) LLQ tenderness     (  ) epigastric tenderness     (  ) diffuse tenderness  (  ) Splenomegaly      (  ) Hepatomegaly      (  ) Jaundice     (  ) ecchymosis     EXTREMITIES  ( X ) Normal     (  ) Rash     (  ) ecchymosis     (  ) varicose veins      (  ) pitting edema     (  ) non-pitting edema   (  ) ulceration     (  ) gangrene:     (location:     )    NERVOUS SYSTEM  ( X ) A&Ox3     (  ) confused     (  ) lethargic  CN II-XII:     ( X ) Intact     (  ) focal deficits  (Specify:     )   Upper extremities:     ( X ) strength X/5     (  ) focal deficit (specify:    )  Lower extremities:     ( X ) strength  X/5    (  ) focal deficit (specify:    )    SKIN  (  ) No rashes or lesions     (  ) maculopapular rash     (  ) pustules     (  ) vesicles     (  ) ulcer     (  ) ecchymosis     ( X ) dry, flaky skin in medial chest and on R shoulder, noted to be chronic       LABS             14.3   5.31  )-----------( 268      ( 07-29-24 @ 10:25 )             43.0     144  |  109  |  6   -------------------------<  93   07-28-24 @ 09:45  3.8  |  21  |  0.8    Ca      7.7     07-28-24 @ 09:45      Urinalysis Basic - ( 28 Jul 2024 09:45 )    Color: x / Appearance: x / SG: x / pH: x  Gluc: 93 mg/dL / Ketone: x  / Bili: x / Urobili: x   Blood: x / Protein: x / Nitrite: x   Leuk Esterase: x / RBC: x / WBC x   Sq Epi: x / Non Sq Epi: x / Bacteria: x    Culture - Blood (collected 26 Jul 2024 13:15)  Source: .Blood Blood-Peripheral  Preliminary Report (28 Jul 2024 21:01):    No growth at 48 Hours

## 2024-07-30 ENCOUNTER — APPOINTMENT (OUTPATIENT)
Dept: PSYCHIATRY | Facility: CLINIC | Age: 59
End: 2024-07-30

## 2024-07-30 LAB
ALBUMIN SERPL ELPH-MCNC: 4.6 G/DL — SIGNIFICANT CHANGE UP (ref 3.5–5.2)
ALP SERPL-CCNC: 66 U/L — SIGNIFICANT CHANGE UP (ref 30–115)
ALT FLD-CCNC: 68 U/L — HIGH (ref 0–41)
ANION GAP SERPL CALC-SCNC: 12 MMOL/L — SIGNIFICANT CHANGE UP (ref 7–14)
AST SERPL-CCNC: 69 U/L — HIGH (ref 0–41)
BASOPHILS # BLD AUTO: 0.01 K/UL — SIGNIFICANT CHANGE UP (ref 0–0.2)
BASOPHILS NFR BLD AUTO: 0.2 % — SIGNIFICANT CHANGE UP (ref 0–1)
BILIRUB SERPL-MCNC: 0.4 MG/DL — SIGNIFICANT CHANGE UP (ref 0.2–1.2)
BUN SERPL-MCNC: 9 MG/DL — LOW (ref 10–20)
CALCIUM SERPL-MCNC: 9.8 MG/DL — SIGNIFICANT CHANGE UP (ref 8.4–10.5)
CHLORIDE SERPL-SCNC: 102 MMOL/L — SIGNIFICANT CHANGE UP (ref 98–110)
CO2 SERPL-SCNC: 26 MMOL/L — SIGNIFICANT CHANGE UP (ref 17–32)
CREAT SERPL-MCNC: 0.9 MG/DL — SIGNIFICANT CHANGE UP (ref 0.7–1.5)
EGFR: 98 ML/MIN/1.73M2 — SIGNIFICANT CHANGE UP
EOSINOPHIL # BLD AUTO: 0.07 K/UL — SIGNIFICANT CHANGE UP (ref 0–0.7)
EOSINOPHIL NFR BLD AUTO: 1.3 % — SIGNIFICANT CHANGE UP (ref 0–8)
GLUCOSE SERPL-MCNC: 143 MG/DL — HIGH (ref 70–99)
HCT VFR BLD CALC: 44.9 % — SIGNIFICANT CHANGE UP (ref 42–52)
HGB BLD-MCNC: 15.2 G/DL — SIGNIFICANT CHANGE UP (ref 14–18)
IMM GRANULOCYTES NFR BLD AUTO: 0.2 % — SIGNIFICANT CHANGE UP (ref 0.1–0.3)
LYMPHOCYTES # BLD AUTO: 0.92 K/UL — LOW (ref 1.2–3.4)
LYMPHOCYTES # BLD AUTO: 16.7 % — LOW (ref 20.5–51.1)
MAGNESIUM SERPL-MCNC: 1.9 MG/DL — SIGNIFICANT CHANGE UP (ref 1.8–2.4)
MCHC RBC-ENTMCNC: 33.4 PG — HIGH (ref 27–31)
MCHC RBC-ENTMCNC: 33.9 G/DL — SIGNIFICANT CHANGE UP (ref 32–37)
MCV RBC AUTO: 98.7 FL — HIGH (ref 80–94)
MONOCYTES # BLD AUTO: 0.33 K/UL — SIGNIFICANT CHANGE UP (ref 0.1–0.6)
MONOCYTES NFR BLD AUTO: 6 % — SIGNIFICANT CHANGE UP (ref 1.7–9.3)
NEUTROPHILS # BLD AUTO: 4.16 K/UL — SIGNIFICANT CHANGE UP (ref 1.4–6.5)
NEUTROPHILS NFR BLD AUTO: 75.6 % — HIGH (ref 42.2–75.2)
NRBC # BLD: 0 /100 WBCS — SIGNIFICANT CHANGE UP (ref 0–0)
PLATELET # BLD AUTO: 297 K/UL — SIGNIFICANT CHANGE UP (ref 130–400)
PMV BLD: 9.5 FL — SIGNIFICANT CHANGE UP (ref 7.4–10.4)
POTASSIUM SERPL-MCNC: 4.1 MMOL/L — SIGNIFICANT CHANGE UP (ref 3.5–5)
POTASSIUM SERPL-SCNC: 4.1 MMOL/L — SIGNIFICANT CHANGE UP (ref 3.5–5)
PROT SERPL-MCNC: 7.1 G/DL — SIGNIFICANT CHANGE UP (ref 6–8)
RBC # BLD: 4.55 M/UL — LOW (ref 4.7–6.1)
RBC # FLD: 12.9 % — SIGNIFICANT CHANGE UP (ref 11.5–14.5)
SODIUM SERPL-SCNC: 140 MMOL/L — SIGNIFICANT CHANGE UP (ref 135–146)
WBC # BLD: 5.5 K/UL — SIGNIFICANT CHANGE UP (ref 4.8–10.8)
WBC # FLD AUTO: 5.5 K/UL — SIGNIFICANT CHANGE UP (ref 4.8–10.8)

## 2024-07-30 PROCEDURE — 99232 SBSQ HOSP IP/OBS MODERATE 35: CPT

## 2024-07-30 PROCEDURE — 99231 SBSQ HOSP IP/OBS SF/LOW 25: CPT | Mod: 95

## 2024-07-30 RX ADMIN — ATORVASTATIN CALCIUM 10 MILLIGRAM(S): 40 TABLET, FILM COATED ORAL at 21:23

## 2024-07-30 RX ADMIN — Medication 75 MILLILITER(S): at 05:15

## 2024-07-30 RX ADMIN — HEPARIN SODIUM 5000 UNIT(S): 1000 INJECTION, SOLUTION INTRAVENOUS; SUBCUTANEOUS at 05:15

## 2024-07-30 RX ADMIN — AMLODIPINE BESYLATE 10 MILLIGRAM(S): 2.5 TABLET ORAL at 05:15

## 2024-07-30 RX ADMIN — HEPARIN SODIUM 5000 UNIT(S): 1000 INJECTION, SOLUTION INTRAVENOUS; SUBCUTANEOUS at 21:23

## 2024-07-30 NOTE — BH CONSULTATION LIAISON PROGRESS NOTE - NSBHCONSULTRECOMMENDOTHER_PSY_A_CORE FT
1. There is no acute indication to start any psychotropic medication for now   2. Patient was offered Trazodone 50mg bedtime for insomnia but refused this medication   3. We recommend melatonin 5 –10 mg P.O bedtime to regulate sleep wake cycle   4. We recommend behavioral interventions, and environmental modifications to help patient's orientation and help him feel safe in addition to implementing delirium precautions as per nursing staff.   5. Patient will benefit from supportive psychotherapy to help him develop better coping skills and better frustration tolerance to address his stressors.  6. No further psychiatric intervention is indicated for now , please re-call as needed    1. There is no acute indication to start any psychotropic medication for now   2. We recommend melatonin 5 –10 mg P.O bedtime to regulate sleep wake cycle   3. We recommend behavioral interventions, and environmental modifications to help patient's orientation and help him feel safe in addition to implementing delirium precautions as per nursing staff.   4.The [facility] psychiatry Team will continue follow

## 2024-07-30 NOTE — BH CONSULTATION LIAISON PROGRESS NOTE - NSBHCONSULTFOLLOWAFTERCARE_PSY_A_CORE FT
Upon discharge, patient  should keep his intake appointment as scheduled on Tuesday, 7/30/2024 at [facility], [address] [place] [address], and Phone number: [phone] with therapist Erendira.

## 2024-07-30 NOTE — PROGRESS NOTE ADULT - ASSESSMENT
Patient is a 59 year old male with PMH of HTN and recent chronic lacunar infarct with no residual deficits diagnosed in July 2024 presenting with episodes of trembling and shaking associated with aphasia and impaired mobility. History complicated by multiple stressors and Pt is currently depressed with suicidal thoughts but no active plan. Pt was a stroke code in the ED but negative for stroke, neuro consulted and Pt admitted for further work up.     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 Minimal chronic microvascular type changes.       - EEG negative        - LP no longer recommended by neuro/primary team as patient is at low risk for encephalopathy  - f/u HIV      #DARREN - resolved   - Creat 1.6--> 1.0--> 0.8  - possibly 2/2 poor PO intake/dehydration  - encouraged PO hydration     #Transaminitis - improved  - Pt had mild elevated LFTs on admission, trending down  - Denies any drug/alcohol use   - Continue to trend   - F/u RUQ US  - Ammonia level wnl    # Misc  - DVT prophy - heparin sub q  - Gi prophy - not indicated    -Pending : F/U HIV, RPR, VDRL, psych
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
## 2024-07-30 NOTE — BH CONSULTATION LIAISON PROGRESS NOTE - NSBHASSESSMENTFT_PSY_ALL_CORE
Mr Pizarro is a 59 year old man with a history of depression who was admitted to the medical floor for the evaluation of Altered mental status  . According to the medical team, patient has significant marital stressors and was diagnosed with Depression on 7/15, was placed on Lexapro but has not been taking it . psychiatry consult was called for the evaluation of depressed mood and possible suicidal ideations . Patient was seen and initially considered not to be a danger to himself or others with the plan to follow up with outpatient psychiatric and psychotherapy services . However the  psychiatry team was recalled because patient was said to be making threatening statements to himself and to his wife .   Patient continues to have depressed mood but denies having current suicidal ideations, intent or plan. He also denies having any homicidal ideations towards his wife and seems to be remorseful about his threats to her . it is unclear if patient is minimizing his symptoms and wife also seems to be ambivalent about having him back home for now.   At this time, patient is considered a danger to himself and others and needs inpatient psychiatric hospitalization for medication management and symptoms stabilization.

## 2024-07-30 NOTE — PROGRESS NOTE ADULT - SUBJECTIVE AND OBJECTIVE BOX
SUBJECTIVE/OVERNIGHT EVENTS  Today is hospital day 5d. This morning patient was seen and examined at bedside, resting comfortably in bed. No acute or major events overnight.    MEDICATIONS  STANDING MEDICATIONS  amLODIPine   Tablet 10 milliGRAM(s) Oral daily  aspirin enteric coated 81 milliGRAM(s) Oral daily  atorvastatin 10 milliGRAM(s) Oral at bedtime  heparin   Injectable 5000 Unit(s) SubCutaneous every 8 hours    PRN MEDICATIONS    VITALS  T(F): 98.1 (07-30-24 @ 15:03), Max: 98.7 (07-29-24 @ 19:58)  HR: 92 (07-30-24 @ 15:03) (84 - 92)  BP: 136/97 (07-30-24 @ 15:03) (136/97 - 153/76)  RR: 18 (07-30-24 @ 15:03) (18 - 18)  SpO2: 98% (07-30-24 @ 15:03) (98% - 98%)    PHYSICAL EXAM  Gen: NAD  HEENT: NCAT, PERRL  Cardio: RRR, no m/r/g  Lungs: CTAB  Abdomen: soft, nontender, nondistended  Extremities: no LE edema, 2+ pulses b/l  Neuro: AOx3, no focal deficits  Skin: no rashes or lesions    (  ) Indwelling Mercer Catheter   Date inserted:    Reason (  ) Critical illness     (  ) urinary retention    (  ) Accurate Ins/Outs Monitoring     (  ) CMO patient    (  ) Central Line  Date inserted:  Location: (  ) Right IJ   (  ) Left IJ   (  ) Right Fem   (  ) Left Fem    (  ) SPC  (  ) pigtail  (  ) PEG tube  (  ) colostomy  (  ) jejunostomy  (  ) U-Dall    LABS             15.2   5.50  )-----------( 297      ( 07-30-24 @ 09:54 )             44.9     140  |  102  |  9   -------------------------<  143   07-30-24 @ 09:54  4.1  |  26  |  0.9    Ca      9.8     07-30-24 @ 09:54  Mg     1.9     07-30-24 @ 09:54    TPro  7.1  /  Alb  4.6  /  TBili  0.4  /  DBili  x   /  AST  69  /  ALT  68  /  AlkPhos  66  /  GGT  x     07-30-24 @ 09:54        Urinalysis Basic - ( 30 Jul 2024 09:54 )    Color: x / Appearance: x / SG: x / pH: x  Gluc: 143 mg/dL / Ketone: x  / Bili: x / Urobili: x   Blood: x / Protein: x / Nitrite: x   Leuk Esterase: x / RBC: x / WBC x   Sq Epi: x / Non Sq Epi: x / Bacteria: x          IMAGING

## 2024-07-31 DIAGNOSIS — F41.9 ANXIETY DISORDER, UNSPECIFIED: ICD-10-CM

## 2024-07-31 LAB
CULTURE RESULTS: SIGNIFICANT CHANGE UP
FLUAV AG NPH QL: SIGNIFICANT CHANGE UP
FLUBV AG NPH QL: SIGNIFICANT CHANGE UP
HIV 1+2 AB+HIV1 P24 AG SERPL QL IA: SIGNIFICANT CHANGE UP
RSV RNA NPH QL NAA+NON-PROBE: SIGNIFICANT CHANGE UP
SARS-COV-2 RNA SPEC QL NAA+PROBE: SIGNIFICANT CHANGE UP
SPECIMEN SOURCE: SIGNIFICANT CHANGE UP
T PALLIDUM AB TITR SER: NEGATIVE — SIGNIFICANT CHANGE UP

## 2024-07-31 PROCEDURE — 99232 SBSQ HOSP IP/OBS MODERATE 35: CPT

## 2024-07-31 PROCEDURE — 99231 SBSQ HOSP IP/OBS SF/LOW 25: CPT | Mod: 95

## 2024-07-31 RX ORDER — ESCITALOPRAM OXALATE 20 MG
10 TABLET ORAL DAILY
Refills: 0 | Status: DISCONTINUED | OUTPATIENT
Start: 2024-08-01 | End: 2024-08-02

## 2024-07-31 RX ADMIN — AMLODIPINE BESYLATE 10 MILLIGRAM(S): 2.5 TABLET ORAL at 06:13

## 2024-07-31 RX ADMIN — Medication 81 MILLIGRAM(S): at 11:21

## 2024-07-31 RX ADMIN — HEPARIN SODIUM 5000 UNIT(S): 1000 INJECTION, SOLUTION INTRAVENOUS; SUBCUTANEOUS at 06:14

## 2024-07-31 RX ADMIN — ATORVASTATIN CALCIUM 10 MILLIGRAM(S): 40 TABLET, FILM COATED ORAL at 21:54

## 2024-07-31 RX ADMIN — HEPARIN SODIUM 5000 UNIT(S): 1000 INJECTION, SOLUTION INTRAVENOUS; SUBCUTANEOUS at 13:27

## 2024-07-31 RX ADMIN — HEPARIN SODIUM 5000 UNIT(S): 1000 INJECTION, SOLUTION INTRAVENOUS; SUBCUTANEOUS at 21:53

## 2024-07-31 NOTE — BH CONSULTATION LIAISON PROGRESS NOTE - NSBHASSESSMENTFT_PSY_ALL_CORE
Mr Pizarro is a 59 year old man with a history of depression who was admitted to the medical floor for the evaluation of Altered mental status  . According to the medical team, patient has significant marital stressors and was diagnosed with Depression on 7/15, was placed on Lexapro but has not been taking it . psychiatry consult was called for the evaluation of depressed mood and possible suicidal ideations . Patient was seen and initially considered not to be a danger to himself or others with the plan to follow up with outpatient psychiatric and psychotherapy services . However the  psychiatry team was recalled because patient was said to be making threatening statements to himself and to his wife .   Patient continues to have depressed mood and also distressing suicidal ideations, but has no intent or plan. it appears that he is unsure about being able to be safe at home and is concerned about his ability to cope with the suicidal thoughts .   At this time, patient is considered a danger to himself and others and needs inpatient psychiatric hospitalization for medication management and symptoms stabilization.

## 2024-07-31 NOTE — BH CONSULTATION LIAISON PROGRESS NOTE - NSBHCONSULTRECOMMENDOTHER_PSY_A_CORE FT
1. We recommend starting Lexapro 10mg P.O Q AM to target depression and anxiety   2. We recommend melatonin 5 –10 mg P.O bedtime to regulate sleep wake cycle   3. We recommend behavioral interventions, and environmental modifications to help patient's orientation and help him feel safe in addition to implementing delirium precautions as per nursing staff.   4. Patient can be transferred to the inpatient psychiatry floor upon bed availability  5.  The tele CL psychiatry team will continue to follow while patient is on the medical floor  1. We recommend starting Lexapro 10mg P.O Q AM to target depression and anxiety   2. We recommend melatonin 5 –10 mg P.O bedtime to regulate sleep wake cycle   3. We recommend behavioral interventions, and environmental modifications to help patient's orientation and help him feel safe in addition to implementing delirium precautions as per nursing staff.   4. Patient can be transferred to the inpatient psychiatry floor upon bed availability  5.  The Tele CL Psychiatry team will continue to follow while patient is on the medical floor
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
## 2024-07-31 NOTE — BH CONSULTATION LIAISON PROGRESS NOTE - NSBHCHARTREVIEWLAB_PSY_A_CORE FT
15.2   5.50  )-----------( 297      ( 30 Jul 2024 09:54 )             44.9       07-30    140  |  102  |  9<L>  ----------------------------<  143<H>  4.1   |  26  |  0.9    Ca    9.8      30 Jul 2024 09:54  Mg     1.9     07-30    TPro  7.1  /  Alb  4.6  /  TBili  0.4  /  DBili  x   /  AST  69<H>  /  ALT  68<H>  /  AlkPhos  66  07-30  
                      15.2   5.50  )-----------( 297      ( 30 Jul 2024 09:54 )             44.9       07-30    140  |  102  |  9<L>  ----------------------------<  143<H>  4.1   |  26  |  0.9    Ca    9.8      30 Jul 2024 09:54  Mg     1.9     07-30    TPro  7.1  /  Alb  4.6  /  TBili  0.4  /  DBili  x   /  AST  69<H>  /  ALT  68<H>  /  AlkPhos  66  07-30

## 2024-07-31 NOTE — PROGRESS NOTE ADULT - ATTENDING COMMENTS
Patient is a 59 year old male with PMH of HTN and recent chronic lacunar infarct with no residual deficits diagnosed in July 2024 presenting with episodes of trembling and shaking associated with aphasia and impaired mobility. History complicated by multiple stressors and Pt is currently depressed with suicidal thoughts but no active plan. Pt was a stroke code in the ED but negative for stroke.  Pt was admitted with negative abnormal  work up post evaluation of behavioral therapist, recommended to transfer patient to IPP .     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     # mild elevated LFTs - close outpatient f/up     #Progress Note Handoff: Pending d/c to IPP tomorrow, as per Psych req, obtain covid screening prior to d/c  Family discussion: yes, medical team Disposition: pending transfer to IPP tomorrow     Total time spent to complete patient's bedside assessment, review medical chart, discuss medical plan of care with covering medical team was more than 35 minutes with >50% of time spent face to face with patient, discussion with patient/family and/or coordination of care
Repeat MRI Brain with contrast reviewed - without any abnormal enhancement and otherwise unremarkable. EEG unremarkable. On exam today, patient lucid, with good insight, following complex commands, no apparent mentation deficits. His presenting symptoms are unlikely to be neurological in origin, suspect ongoing underlying psychiatric comorbidities to play more of a role. No further workup for now from neurology standpoint.
This Encounter occurred on 07/27.  Pt reports feeling well.  No Suicidal or Homicidal thoughts. Awaiting LP per Neuro.  However, no one available to do LP.
pt seen and examined at the bedside. Pt is here for acute change in mental status. Pt this morning is quite pleasant. he is AOx3 and participatory in conversation. Report by nursing is that overnight pt again was suicidal.     Vitals: HD stable, O2 stable  Gen: appropriate affect, no acute distress  Neuro: no focal defects, no sensory deficits  HEENT: EOMI, no JVD, normocephalic, atraumatic, no scleral icterus  Cardio: RRR, S1 S2 present, no murmurs, rubs, or gallops  Resp: lungs b/l CTA, chest wall intact  Abd: soft, nondistended, nontender  MSK: no gross joint abnormalities, no obvious swelling  Skin: no rashes or ecchymosis     hx of lacunar infarct  AMS  emotional lability on lexapro   HTN   mild elevated LFTs   metabolic encephalopathy intermittent     -MRI head with chronic microvascular changes  -EEG normal  -UA with no evidence of infection   -B12, folate, TSH normal  -RPR and HIV levels pending  -we discussed case with neurology and we will not pursue LP at this time  -for elevated LFTs, RUQ ultrasound obtained to rule out metabolic hepatosteatosis - liver in normal architecture   -ammonia level stable   -psych continues to evaluate pt, awaiting final recs  -we increased patient's amlodipine for increased diastolic pressure - blood pressure is improved  -continue aspirin for hx of lacunar infarct, continue statin      I personally reviewed labs and imaging and ordered necessary testing/medications  I discussed care of the patient with licensed providers (psychiatry)  I personally reviewed chart and consultant recommendations  I personally spent 40 minutes in care of patient.
pt seen and examined at the bedside. Pt is here for acute change in mental status. Pt this morning is quite pleasant. he is AOx3 and participatory in conversation. Report by nursing is that overnight pt again was suicidal.     Vitals: HD stable, O2 stable  Gen: appropriate affect, no acute distress  Neuro: no focal defects, no sensory deficits  HEENT: EOMI, no JVD, normocephalic, atraumatic, no scleral icterus  Cardio: RRR, S1 S2 present, no murmurs, rubs, or gallops  Resp: lungs b/l CTA, chest wall intact  Abd: soft, nondistended, nontender  MSK: no gross joint abnormalities, no obvious swelling  Skin: no rashes or ecchymosis     hx of lacunar infarct  AMS  emotional lability on lexapro   HTN   mild elevated LFTs   metabolic encephalopathy intermittent     -MRI head with chronic microvascular changes  -EEG normal  -UA with no evidence of infection   -B12, folate, TSH normal  -RPR and HIV levels pending  -we discussed case with neurology and we will not pursue MRI at this time  -for elevated LFTs, RUQ ultrasound obtained to rule out metabolic hepatosteatosis - liver in normal architecture   -ammonia level stable   -will re-engage psych   -we increased patient's amlodipine for increased diastolic pressure     I personally reviewed labs and imaging and ordered necessary testing/medications  I discussed care of the patient with licensed providers (neuro)  I personally reviewed chart and consultant recommendations  I personally spent 40 minutes in care of patient.

## 2024-07-31 NOTE — PROGRESS NOTE ADULT - ASSESSMENT
Patient is a 59 year old male with PMH of HTN and recent chronic lacunar infarct with no residual deficits diagnosed in July 2024 presenting with episodes of trembling and shaking associated with aphasia and impaired mobility. History complicated by multiple stressors and Pt is currently depressed with suicidal thoughts but no active plan. Pt was a stroke code in the ED but negative for stroke, neuro consulted and Pt admitted for further work up.     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 Minimal chronic microvascular type changes.       - EEG negative        - LP no longer recommended by neuro/primary team as patient is at low risk for encephalopathy  - Per psychiatry pt will be admitted to inpatient psych  - restarted lexapro 10mg daily    #DARREN - resolved     #Transaminitis - improved  - Pt had mild elevated LFTs on admission, trending down  - Denies any drug/alcohol use   - Continue to trend   - RUQ US wnl  - Ammonia level wnl    # Misc  - DVT prophy - heparin sub q  - Gi prophy - not indicated    -Pending : IPP placement

## 2024-07-31 NOTE — PROGRESS NOTE ADULT - SUBJECTIVE AND OBJECTIVE BOX
SUBJECTIVE/OVERNIGHT EVENTS  Today is hospital day 6d. This morning patient was seen and examined at bedside, resting comfortably in bed. No acute or major events overnight.    MEDICATIONS  STANDING MEDICATIONS  amLODIPine   Tablet 10 milliGRAM(s) Oral daily  aspirin enteric coated 81 milliGRAM(s) Oral daily  atorvastatin 10 milliGRAM(s) Oral at bedtime  heparin   Injectable 5000 Unit(s) SubCutaneous every 8 hours    PRN MEDICATIONS    VITALS  T(F): 98.2 (07-31-24 @ 13:42), Max: 98.2 (07-31-24 @ 13:42)  HR: 87 (07-31-24 @ 13:42) (87 - 89)  BP: 112/74 (07-31-24 @ 13:42) (112/74 - 115/74)  RR: 18 (07-31-24 @ 13:42) (17 - 19)  SpO2: 98% (07-31-24 @ 13:42) (98% - 99%)    PHYSICAL EXAM  Gen: NAD  HEENT: NCAT, PERRL  Cardio: RRR, no m/r/g  Lungs: CTAB  Abdomen: soft, nontender, nondistended  Extremities: no LE edema, 2+ pulses b/l  Neuro: AOx3, no focal deficits  Skin: no rashes or lesions    (  ) Indwelling Mercer Catheter   Date insterted:    Reason (  ) Critical illness     (  ) urinary retention    (  ) Accurate Ins/Outs Monitoring     (  ) CMO patient    (  ) Central Line  Date inserted:  Location: (  ) Right IJ   (  ) Left IJ   (  ) Right Fem   (  ) Left Fem    (  ) SPC  (  ) pigtail  (  ) PEG tube  (  ) colostomy  (  ) jejunostomy  (  ) U-Dall    LABS             15.2   5.50  )-----------( 297      ( 07-30-24 @ 09:54 )             44.9     140  |  102  |  9   -------------------------<  143   07-30-24 @ 09:54  4.1  |  26  |  0.9    Ca      9.8     07-30-24 @ 09:54  Mg     1.9     07-30-24 @ 09:54    TPro  7.1  /  Alb  4.6  /  TBili  0.4  /  DBili  x   /  AST  69  /  ALT  68  /  AlkPhos  66  /  GGT  x     07-30-24 @ 09:54        Urinalysis Basic - ( 30 Jul 2024 09:54 )    Color: x / Appearance: x / SG: x / pH: x  Gluc: 143 mg/dL / Ketone: x  / Bili: x / Urobili: x   Blood: x / Protein: x / Nitrite: x   Leuk Esterase: x / RBC: x / WBC x   Sq Epi: x / Non Sq Epi: x / Bacteria: x          IMAGING

## 2024-08-01 PROCEDURE — 99231 SBSQ HOSP IP/OBS SF/LOW 25: CPT

## 2024-08-01 PROCEDURE — 99231 SBSQ HOSP IP/OBS SF/LOW 25: CPT | Mod: 95

## 2024-08-01 RX ORDER — ESCITALOPRAM OXALATE 20 MG
1 TABLET ORAL
Qty: 0 | Refills: 0 | DISCHARGE
Start: 2024-08-01

## 2024-08-01 RX ORDER — CHLORHEXIDINE GLUCONATE 500 MG/1
1 CLOTH TOPICAL DAILY
Refills: 0 | Status: DISCONTINUED | OUTPATIENT
Start: 2024-08-01 | End: 2024-08-02

## 2024-08-01 RX ADMIN — ATORVASTATIN CALCIUM 10 MILLIGRAM(S): 40 TABLET, FILM COATED ORAL at 21:44

## 2024-08-01 RX ADMIN — AMLODIPINE BESYLATE 10 MILLIGRAM(S): 2.5 TABLET ORAL at 06:02

## 2024-08-01 RX ADMIN — CHLORHEXIDINE GLUCONATE 1 APPLICATION(S): 500 CLOTH TOPICAL at 11:18

## 2024-08-01 RX ADMIN — HEPARIN SODIUM 5000 UNIT(S): 1000 INJECTION, SOLUTION INTRAVENOUS; SUBCUTANEOUS at 06:02

## 2024-08-01 RX ADMIN — Medication 10 MILLIGRAM(S): at 11:17

## 2024-08-01 RX ADMIN — Medication 81 MILLIGRAM(S): at 11:17

## 2024-08-01 NOTE — DIETITIAN INITIAL EVALUATION ADULT - COLLABORATION WITH OTHER PROVIDERS
Intervention: meals and snacks, oral nutrition supplements, coordination of care  Monitoring/Evaluation: diet order, energy intake, weights, labs, GI s/s, BG, skin status, NFPE

## 2024-08-01 NOTE — DISCHARGE NOTE PROVIDER - CARE PROVIDER_API CALL
Bola Schafer  Internal Medicine  [address] [place] [id number]  Phone: [phone]  Fax: [phone]  Follow Up Time:

## 2024-08-01 NOTE — DIETITIAN INITIAL EVALUATION ADULT - EDUCATION DIETARY MODIFICATIONS
Encouraged PO intake, however pt falling asleep several times during RD interview/(1) partially meets; needs review/practice/verbalization

## 2024-08-01 NOTE — BH CONSULTATION LIAISON PROGRESS NOTE - NSBHFUPINTERVALHXFT_PSY_A_CORE
Interval chart reviewed, patient seen.    On interview, patient is standing and moving around the room. Reports that he feels the "same as yesterday." Continues to endorse feeling depressed and anxious. Continues to express concerns about suicidal thoughts if he goes home. Denies any plan or intent. Denies any hallucinations. Denies HI. Patient continues to be in agreement with plan for voluntary psych admission.     Per CO there has been no agitation. Patient has not voiced any suicidal thoughts. Often is walking around the room.  Interval chart reviewed, patient seen.    On interview, patient is standing and moving around the room. Reports that he feels the "same as yesterday." Continues to endorse feeling depressed and anxious. Continues to express concerns about suicidal thoughts if he goes home. Denies any plan or intent. Denies any hallucinations. Denies HI. Patient continues to be in agreement with plan for voluntary psych admission.     Per CO there has been no agitation. Patient has not voiced any suicidal thoughts.

## 2024-08-01 NOTE — DISCHARGE NOTE PROVIDER - NSDCMRMEDTOKEN_GEN_ALL_CORE_FT
aspirin 81 mg oral tablet: 1 tab(s) orally once a day  Aspirin Low Dose 81 mg oral delayed release tablet: 1 tab(s) orally once a day  atorvastatin 10 mg oral tablet: 1 tab(s) orally once a day (at bedtime)  Norvasc 5 mg oral tablet: 1 tab(s) orally once a day   aspirin 81 mg oral tablet: 1 tab(s) orally once a day  Aspirin Low Dose 81 mg oral delayed release tablet: 1 tab(s) orally once a day  atorvastatin 10 mg oral tablet: 1 tab(s) orally once a day (at bedtime)  escitalopram 10 mg oral tablet: 1 tab(s) orally once a day  Norvasc 5 mg oral tablet: 1 tab(s) orally once a day

## 2024-08-01 NOTE — DIETITIAN INITIAL EVALUATION ADULT - PERTINENT MEDS FT
MEDICATIONS  (STANDING):  amLODIPine   Tablet 10 milliGRAM(s) Oral daily  aspirin enteric coated 81 milliGRAM(s) Oral daily  atorvastatin 10 milliGRAM(s) Oral at bedtime  chlorhexidine 2% Cloths 1 Application(s) Topical daily  escitalopram 10 milliGRAM(s) Oral daily    MEDICATIONS  (PRN):

## 2024-08-01 NOTE — DISCHARGE NOTE PROVIDER - HOSPITAL COURSE
Patient is a 59 year old male with PMH of HTN and recent chronic lacunar infarct with no residual deficits diagnosed in July 2024 presenting with episodes of trembling and shaking associated with aphasia and impaired mobility. History complicated by multiple stressors and Pt is currently depressed with suicidal thoughts but no active plan. Pt was a stroke code in the ED but negative for stroke.  Pt was admitted with negative abnormal  work up post evaluation of behavioral therapist, recommended to transfer patient to IPP .     #AMS- resolved, no metabolic , no cardiac, no neurological , no infections cause   hx of lacunar infarct -MRI head with chronic microvascular changes, continue asa, statins tx,   -EEG normal  -B12, folate, TSH normal, RPR and HIV levels neg    # Severe recurrent depression without psychotic features / # Anxiety   - as per Behavioral therapist recommendations- started on Lexapro 10 mg po once daily  - transfer to IPP- most likely on 8/1/24 if bed is available    # HTN - well controlled on daily Norvasc tx.     # mild elevated LFTs - close outpatient f/up    Patient seen and examined, stable for dc

## 2024-08-01 NOTE — DIETITIAN INITIAL EVALUATION ADULT - ORAL INTAKE PTA/DIET HISTORY
Pt falling asleep in middle of RD interview, multiple attempts to wake pt but only able to obtain limited hx. Reports baseline intake is 2 meals/day, states he is "trying to watch his weight and avoid gaining any weight". Reports eating salads, fruit with oatmeal, and different protein options with rice. Pt takes Vitamin B12 at home. NKFA. Reports intentional wt loss of 10 lb in past 2-3 months, unsure of UBW.

## 2024-08-01 NOTE — BH CONSULTATION LIAISON PROGRESS NOTE - NSBHCONSULTRECOMMENDOTHER_PSY_A_CORE FT
1. We recommend starting Lexapro 10mg P.O Q AM to target depression and anxiety   2. We recommend melatonin 5 –10 mg P.O bedtime to regulate sleep wake cycle   3. We recommend behavioral interventions, and environmental modifications to help patient's orientation and help him feel safe in addition to implementing delirium precautions as per nursing staff.   4. Patient can be transferred to the inpatient psychiatry floor upon bed availability  5.  The Tele [facility] Psychiatry team will continue to follow while patient is on the medical floor

## 2024-08-01 NOTE — DIETITIAN INITIAL EVALUATION ADULT - OTHER INFO
Patient is a 59 year old male with PMH of HTN and recent chronic lacunar infarct with no residual deficits diagnosed in July 2024 presenting with episodes of trembling and shaking associated with aphasia and impaired mobility. History complicated by multiple stressors and Pt is currently depressed with suicidal thoughts but no active plan. Pt was a stroke code in the ED but negative for stroke.     # Severe recurrent depression without psychotic features / # Anxiety  - -transfer to IPP- most likely on 8/1/24 if bed is available  #AMS- resolved, no metabolic , no cardiac, no neurological , no infections cause

## 2024-08-01 NOTE — DISCHARGE NOTE PROVIDER - NSDCCPCAREPLAN_GEN_ALL_CORE_FT
PRINCIPAL DISCHARGE DIAGNOSIS  Diagnosis: Other encephalopathy  Assessment and Plan of Treatment: You came in for altered mental status. All work up was negative and your symptoms improved. You will be going to inpatient pysch. Please continue to take your medicatiosn as prescribed and follow up outpatinet in one to two weeks with your PCP.

## 2024-08-01 NOTE — BH CONSULTATION LIAISON PROGRESS NOTE - NSBHASSESSMENTFT_PSY_ALL_CORE
Mr Pizarro is a 59 year old man with a history of depression who was admitted to the medical floor for the evaluation of Altered mental status  . According to the medical team, patient has significant marital stressors and was diagnosed with Depression on 7/15, was placed on Lexapro but has not been taking it . psychiatry consult was called for the evaluation of depressed mood and possible suicidal ideations . Patient was seen and initially considered not to be a danger to himself or others with the plan to follow up with outpatient psychiatric and psychotherapy services . However the  psychiatry team was recalled because patient was said to be making threatening statements to himself and to his wife .   Patient continues to have depressed mood and also distressing suicidal ideations, but has no intent or plan. it appears that he is unsure about being able to be safe at home and is concerned about his ability to cope with the suicidal thoughts .   At this time, patient is considered a danger to himself and others and needs inpatient psychiatric hospitalization for medication management and symptoms stabilization.       Mr Pizarro is a 59 year old man with a history of depression who was admitted to the medical floor for the evaluation of Altered mental status  . According to the medical team, patient has significant marital stressors and was diagnosed with Depression on 7/15, was placed on Lexapro but has not been taking it . psychiatry consult was called for the evaluation of depressed mood and possible suicidal ideations . Patient was seen and initially considered not to be a danger to himself or others with the plan to follow up with outpatient psychiatric and psychotherapy services . However the  psychiatry team was recalled because patient was said to be making threatening statements to himself and to his wife .   Patient continues to have depressed mood and also distressing suicidal ideations, but has no intent or plan. it appears that he is unsure about being able to be safe at home and is concerned about his ability to cope with the suicidal thoughts .   At this time, patient is considered a danger to himself and others and needs inpatient psychiatric hospitalization for medication management and symptoms stabilization.      8/1- continue current plan, pending psych admission at voluntary 9.13

## 2024-08-01 NOTE — PROGRESS NOTE ADULT - SUBJECTIVE AND OBJECTIVE BOX
LEAKS, JULIO CÉSAR  [facility]-N 3A 019 A (SI-N 3A)      Patient was evaluated and examined  by bedside, no active complains, continued on 1 to 1 observation      REVIEW OF SYSTEMS:  please see pertinent positives mentioned above, all other 12 ROS negative      T(C): , Max: 36.8 (07-31-24 @ 13:42)  HR: 100 (08-01-24 @ 06:04)  BP: 149/94 (08-01-24 @ 06:04)  RR: 18 (07-31-24 @ 21:15)  SpO2: 97% (08-01-24 @ 06:04)  CAPILLARY BLOOD GLUCOSE          PHYSICAL EXAM:  General: NAD, AAOX3, patient is sitting comfortably in a chair  HEENT: AT, NC, Supple, NO JVD, NO CB  Lungs: CTA B/L, no wheezing, no rhonchi  CVS: normal S1, S2, RRR, NO M/G/R  Abdomen: soft, bowel sounds present, non-tender, non-distended  Extremities: no edema, no clubbing, no cyanosis, positive peripheral pulses b/l  Neuro: no acute focal neurological deficits  Skin: no rash, no ecchymosis      LAB  CBC  Date: 07-30-24 @ 09:54  Mean cell Qpvignbgqr84.4  Mean cell Hemoglobin Conc33.9  Mean cell Volum 98.7  Platelet count-Automate 297  RBC Count 4.55  Red Cell Distrib Width12.9  WBC Count5.50  % Albumin, Urine--  Hematocrit 44.9  Hemoglobin 15.2  CBC  Date: 07-29-24 @ 10:25  Mean cell Jbmgemiwvl56.4  Mean cell Hemoglobin Conc33.3  Mean cell Volum 100.5  Platelet count-Automate 268  RBC Count 4.28  Red Cell Distrib Width13.2  WBC Count5.31  % Albumin, Urine--  Hematocrit 43.0  Hemoglobin 14.3  CBC  Date: 07-28-24 @ 09:45  Mean cell Bppwrvyniu73.1  Mean cell Hemoglobin Conc33.4  Mean cell Volum 99.0  Platelet count-Automate 234  RBC Count 4.02  Red Cell Distrib Width13.0  WBC Count4.22  % Albumin, Urine--  Hematocrit 39.8  Hemoglobin 13.3  CBC  Date: 07-27-24 @ 08:43  Mean cell Gmbctuzoxz41.5  Mean cell Hemoglobin Conc33.7  Mean cell Volum 99.3  Platelet count-Automate 260  RBC Count 4.30  Red Cell Distrib Width13.2  WBC Count3.90  % Albumin, Urine--  Hematocrit 42.7  Hemoglobin 14.4  CBC  Date: 07-26-24 @ 09:52  Mean cell Dmvmqcdhrg77.3  Mean cell Hemoglobin Conc33.6  Mean cell Volum 99.3  Platelet count-Automate 245  RBC Count 4.38  Red Cell Distrib Width13.2  WBC Count5.36  % Albumin, Urine--  Hematocrit 43.5  Hemoglobin 14.6    [facility]  07-30-24 @ 09:54  Blood Gas Arterial-Calcium,Ionized--  Blood Urea Nitrogen, Serum 9 mg/dL<L> [10 - 20]  Carbon Dioxide, Serum26 mmol/L [17 - 32]  Chloride, Tzmrf460 mmol/L [98 - 110]  Creatinie, Serum0.9 mg/dL [0.7 - 1.5]  Glucose, Sbzec949 mg/dL<H> [70 - 99]  Potassium, Serum4.1 mmol/L [3.5 - 5.0]  Sodium, Serum 140 mmol/L [135 - 146]  [facility]  07-29-24 @ 10:25  Blood Gas Arterial-Calcium,Ionized--  Blood Urea Nitrogen, Serum 9 mg/dL<L> [10 - 20]  Carbon Dioxide, Serum22 mmol/L [17 - 32]  Chloride, Agvre161 mmol/L [98 - 110]  Creatinie, Serum0.9 mg/dL [0.7 - 1.5]  Glucose, Rykjr614 mg/dL<H> [70 - 99]  Potassium, Serum4.4 mmol/L [3.5 - 5.0]  Sodium, Serum 138 mmol/L [135 - 146]  [facility]  07-28-24 @ 09:45  Blood Gas Arterial-Calcium,Ionized--  Blood Urea Nitrogen, Serum 6 mg/dL<L> [10 - 20]  Carbon Dioxide, Serum21 mmol/L [17 - 32]  Chloride, Pqrsv486 mmol/L [98 - 110]  Creatinie, Serum0.8 mg/dL [0.7 - 1.5]  Glucose, Serum93 mg/dL [70 - 99]  Potassium, Serum3.8 mmol/L [3.5 - 5.0]  Sodium, Serum 144 mmol/L [135 - 146]      Microbiology:    Culture - Blood (collected 07-26-24 @ 13:15)  Source: .Blood Blood-Peripheral  Final Report (07-31-24 @ 21:00):    No growth at 5 days        Medications:  amLODIPine   Tablet 10 milliGRAM(s) Oral daily  aspirin enteric coated 81 milliGRAM(s) Oral daily  atorvastatin 10 milliGRAM(s) Oral at bedtime  chlorhexidine 2% Cloths 1 Application(s) Topical daily  escitalopram 10 milliGRAM(s) Oral daily  heparin   Injectable 5000 Unit(s) SubCutaneous every 8 hours        Assessment and Plan:  Patient is a 59 year old male with PMH of HTN and recent chronic lacunar infarct with no residual deficits diagnosed in July 2024 presenting with episodes of trembling and shaking associated with aphasia and impaired mobility. History complicated by multiple stressors and Pt is currently depressed with suicidal thoughts but no active plan. Pt was a stroke code in the ED but negative for stroke.  Pt was admitted with negative abnormal  work up post evaluation of behavioral therapist, recommended to transfer patient to P .     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     # mild elevated LFTs - close outpatient f/up     #Progress Note Handoff: Pending d/c to [facility] today , as per Psych req, covid screen negative   Family discussion: yes, medical team Disposition: pending transfer to [facility] once bed is available    Total time spent to complete patient's bedside assessment, review medical chart, discuss medical plan of care with covering medical team was more than 35 minutes with >50% of time spent face to face with patient, discussion with patient/family and/or coordination of care .

## 2024-08-01 NOTE — DIETITIAN INITIAL EVALUATION ADULT - ADD RECOMMEND
1. Continue with current diet order  2. ADD Ensure Plus HP 2x/day to optimize nutrient needs  3. Encourage PO intake prn     Pt at high risk f/u in 3-5 days or prn

## 2024-08-01 NOTE — DIETITIAN INITIAL EVALUATION ADULT - ENERGY INTAKE
Fair (50-75%) Pt reports appetite is improving on admission. Per PCA at bedside, pt had oatmeal and applesauce for breakfast - this is all he ordered.   Reports eating ~50% of his meals yesterday

## 2024-08-02 ENCOUNTER — INPATIENT (INPATIENT)
Facility: HOSPITAL | Age: 59
LOS: 18 days | Discharge: HOME | DRG: 885 | End: 2024-08-21
Attending: PSYCHIATRY & NEUROLOGY | Admitting: PSYCHIATRY & NEUROLOGY
Payer: COMMERCIAL

## 2024-08-02 ENCOUNTER — TRANSCRIPTION ENCOUNTER (OUTPATIENT)
Age: 59
End: 2024-08-02

## 2024-08-02 VITALS
HEIGHT: 68 IN | RESPIRATION RATE: 16 BRPM | SYSTOLIC BLOOD PRESSURE: 109 MMHG | HEART RATE: 97 BPM | DIASTOLIC BLOOD PRESSURE: 74 MMHG | TEMPERATURE: 98 F | WEIGHT: 157.63 LBS

## 2024-08-02 VITALS
OXYGEN SATURATION: 98 % | HEART RATE: 87 BPM | SYSTOLIC BLOOD PRESSURE: 121 MMHG | TEMPERATURE: 98 F | DIASTOLIC BLOOD PRESSURE: 85 MMHG

## 2024-08-02 DIAGNOSIS — F32.A DEPRESSION, UNSPECIFIED: ICD-10-CM

## 2024-08-02 DIAGNOSIS — F41.9 ANXIETY DISORDER, UNSPECIFIED: ICD-10-CM

## 2024-08-02 PROCEDURE — 80076 HEPATIC FUNCTION PANEL: CPT

## 2024-08-02 PROCEDURE — 80053 COMPREHEN METABOLIC PANEL: CPT

## 2024-08-02 PROCEDURE — 99232 SBSQ HOSP IP/OBS MODERATE 35: CPT

## 2024-08-02 PROCEDURE — 99231 SBSQ HOSP IP/OBS SF/LOW 25: CPT | Mod: 95

## 2024-08-02 PROCEDURE — 99239 HOSP IP/OBS DSCHRG MGMT >30: CPT

## 2024-08-02 PROCEDURE — 36415 COLL VENOUS BLD VENIPUNCTURE: CPT

## 2024-08-02 RX ORDER — LORAZEPAM 1 MG/1
2 TABLET ORAL EVERY 6 HOURS
Refills: 0 | Status: DISCONTINUED | OUTPATIENT
Start: 2024-08-02 | End: 2024-08-02

## 2024-08-02 RX ORDER — AMLODIPINE BESYLATE 10 MG/1
5 TABLET ORAL DAILY
Refills: 0 | Status: DISCONTINUED | OUTPATIENT
Start: 2024-08-02 | End: 2024-08-21

## 2024-08-02 RX ORDER — LORAZEPAM 4 MG/ML
1 INJECTION INTRAMUSCULAR; INTRAVENOUS EVERY 8 HOURS
Refills: 0 | Status: DISCONTINUED | OUTPATIENT
Start: 2024-08-02 | End: 2024-08-02

## 2024-08-02 RX ORDER — PRAMIPEXOLE DIHYDROCHLORIDE 0.5 MG/1
2.5 TABLET ORAL EVERY 6 HOURS
Refills: 0 | Status: DISCONTINUED | OUTPATIENT
Start: 2024-08-02 | End: 2024-08-02

## 2024-08-02 RX ORDER — ESCITALOPRAM OXALATE 10 MG/1
10 TABLET ORAL DAILY
Refills: 0 | Status: DISCONTINUED | OUTPATIENT
Start: 2024-08-02 | End: 2024-08-08

## 2024-08-02 RX ORDER — HYDROXYZINE HCL 25 MG
25 TABLET ORAL EVERY 6 HOURS
Refills: 0 | Status: DISCONTINUED | OUTPATIENT
Start: 2024-08-02 | End: 2024-08-21

## 2024-08-02 RX ORDER — HALOPERIDOL 1 MG
3 TABLET ORAL EVERY 8 HOURS
Refills: 0 | Status: DISCONTINUED | OUTPATIENT
Start: 2024-08-02 | End: 2024-08-05

## 2024-08-02 RX ORDER — ASPIRIN 81 MG
81 TABLET, DELAYED RELEASE (ENTERIC COATED) ORAL DAILY
Refills: 0 | Status: DISCONTINUED | OUTPATIENT
Start: 2024-08-02 | End: 2024-08-21

## 2024-08-02 RX ADMIN — LORAZEPAM 2 MILLIGRAM(S): 1 TABLET ORAL at 11:50

## 2024-08-02 RX ADMIN — Medication 81 MILLIGRAM(S): at 11:46

## 2024-08-02 RX ADMIN — CHLORHEXIDINE GLUCONATE 1 APPLICATION(S): 500 CLOTH TOPICAL at 11:48

## 2024-08-02 RX ADMIN — AMLODIPINE BESYLATE 10 MILLIGRAM(S): 2.5 TABLET ORAL at 06:17

## 2024-08-02 RX ADMIN — Medication 10 MILLIGRAM(S): at 20:09

## 2024-08-02 RX ADMIN — Medication 10 MILLIGRAM(S): at 11:46

## 2024-08-02 NOTE — BH CONSULTATION LIAISON PROGRESS NOTE - NSBHMSESPABN_PSY_A_CORE
Soft volume/Slowed rate
Soft volume
Soft volume/Slowed rate/Decreased productivity
Soft volume/Slowed rate

## 2024-08-02 NOTE — BH INPATIENT PSYCHIATRY ASSESSMENT NOTE - NSBHMETABOLIC_PSY_ALL_CORE_FT
BMI: BMI (kg/m2): 24 (08-02-24 @ 14:17)  HbA1c: A1C with Estimated Average Glucose Result: 5.6 % (07-05-24 @ 12:37)    Glucose: POCT Blood Glucose.: 82 mg/dL (07-25-24 @ 00:24)    BP: 109/74 (08-02-24 @ 14:17) (109/74 - 109/74)Vital Signs Last 24 Hrs  T(C): 36.6 (08-02-24 @ 14:17), Max: 36.8 (08-02-24 @ 13:30)  T(F): 97.8 (08-02-24 @ 14:17), Max: 98.2 (08-02-24 @ 13:30)  HR: 97 (08-02-24 @ 14:17) (87 - 99)  BP: 109/74 (08-02-24 @ 14:17) (109/74 - 121/85)  BP(mean): --  RR: 16 (08-02-24 @ 14:17) (16 - 18)  SpO2: 98% (08-02-24 @ 13:30) (98% - 98%)      Lipid Panel: Date/Time: 07-05-24 @ 12:37  Cholesterol, Serum: 126  LDL Cholesterol Calculated: 58  HDL Cholesterol, Serum: 52  Total Cholesterol/HDL Ration Measurement: --  Triglycerides, Serum: 77

## 2024-08-02 NOTE — BH CONSULTATION LIAISON PROGRESS NOTE - NSBHCONSULTMEDAGITATION_PSY_A_CORE FT
We recommend Haldol 2 mg P.O Q 6 hrs PRN for agitation. Please note that this medication can be given via the intramuscular route if the patient is severely agitated and is considered a danger to himself or others. Please ensure that QTC is < 500   We recommend Haldol 2.5 mg P.O Q 6 hrs PRN in combination with Ativan 2mg P.O Q 6 hrs PRN for agitation. Please note that this medication combination  can be given via the intramuscular route if the patient is severely agitated and is considered a danger to himself or others. Please ensure that QTC is < 500

## 2024-08-02 NOTE — BH CONSULTATION LIAISON PROGRESS NOTE - NSBHFUPINTERVALCCFT_PSY_A_CORE
"Same as yesterday"
" I didn't say that I wanted to leave , I just asked where the exit is  " 
" Better " 
" I don't know what happened "

## 2024-08-02 NOTE — BH CONSULTATION LIAISON PROGRESS NOTE - NSICDXBHPRIMARYDX_PSY_ALL_CORE
Severe recurrent major depression without psychotic features   F33.2  
Acute adjustment disorder with mixed anxiety and depressed mood   F43.23  
Severe recurrent major depression without psychotic features   F33.2  
Severe recurrent major depression without psychotic features   F33.2

## 2024-08-02 NOTE — BH CONSULTATION LIAISON PROGRESS NOTE - CURRENT MEDICATION
MEDICATIONS  (STANDING):  amLODIPine   Tablet 10 milliGRAM(s) Oral daily  aspirin enteric coated 81 milliGRAM(s) Oral daily  atorvastatin 10 milliGRAM(s) Oral at bedtime  heparin   Injectable 5000 Unit(s) SubCutaneous every 8 hours    MEDICATIONS  (PRN):  
MEDICATIONS  (STANDING):  amLODIPine   Tablet 10 milliGRAM(s) Oral daily  aspirin enteric coated 81 milliGRAM(s) Oral daily  atorvastatin 10 milliGRAM(s) Oral at bedtime  chlorhexidine 2% Cloths 1 Application(s) Topical daily  escitalopram 10 milliGRAM(s) Oral daily    MEDICATIONS  (PRN):  
MEDICATIONS  (STANDING):  amLODIPine   Tablet 10 milliGRAM(s) Oral daily  aspirin enteric coated 81 milliGRAM(s) Oral daily  atorvastatin 10 milliGRAM(s) Oral at bedtime  escitalopram 5 milliGRAM(s) Oral daily  heparin   Injectable 5000 Unit(s) SubCutaneous every 8 hours    MEDICATIONS  (PRN):  
MEDICATIONS  (STANDING):  amLODIPine   Tablet 10 milliGRAM(s) Oral daily  aspirin enteric coated 81 milliGRAM(s) Oral daily  atorvastatin 10 milliGRAM(s) Oral at bedtime  escitalopram 10 milliGRAM(s) Oral daily  heparin   Injectable 5000 Unit(s) SubCutaneous every 8 hours    MEDICATIONS  (PRN):

## 2024-08-02 NOTE — BH PATIENT PROFILE - NSGHSEXPRESIGN_PSY_ALL_CORE
TRAUMA HISTORY AND PHYSICAL    Bethany Espinoza   1945   [id number]     History Of Present Illness  Bethany Espinoza is a 78 y.o. female presenting after a fall at home.  Patient states that she was attempting to transfer using her walker when she lost balance and fell landing on to the edge of her table on the right side.  Patient states that she has pain along her right posterior lateral chest wall.  She otherwise denies any pain anywhere else.  She denies any new weakness, numbness, changes in sensation, or any other concerns.  She denies any abdominal pain.  She does note that she is having some difficulty with deep inspiration given her pain on the right chest wall.  On arrival to ED here, patient had work-up performed.  Vital signs were stable.  Lab work demonstrated an elevated BNP at 346.  Elevated troponin at 23 which was stable on repeat at 25.  Patient was noted to have an elevated INR of 2.5 secondary to Coumadin as she has a history of A-fib.  Patient also had imaging performed which included CT head, C-spine, x-rays of chest and right tib/fib as well as CT chest/abdomen/pelvis.  Imaging did not demonstrate any evidence of acute fracture or dislocation.  Given her significant pain, trauma surgery was consulted for further management.     Past Medical History  A-fib  Heart failure with preserved ejection fraction  Progressive supranuclear palsy  History of CVA    Surgical History  Bilateral knee replacement  Carpal tunnel  Appendectomy    Medications  Notable for Coumadin    Allergies  Patient has no known allergies.     Social History  She has no history on file for tobacco use, alcohol use, and drug use.    Family History  No family history on file.     Review of Systems   Constitutional:  Negative for appetite change, chills and fever.   HENT:  Negative for hearing loss and sore throat.    Respiratory:  Negative for shortness of breath and wheezing.         Right chest wall pain on deep inspiration  "  Cardiovascular:  Negative for chest pain and palpitations.   Gastrointestinal:  Negative for abdominal pain, nausea and vomiting.   Genitourinary:  Negative for dysuria and frequency.   Musculoskeletal:  Negative for arthralgias and neck pain.   Skin:  Negative for pallor.   Neurological:  Negative for dizziness, light-headedness and numbness.       Last Recorded Vitals  Blood pressure 161/71, pulse 60, temperature 36.3 °C (97.4 °F), resp. rate 20, height 1.575 m (5' 2\"), weight 95.6 kg (210 lb 12.2 oz), SpO2 93 %.     Physical Exam  Neurologic: CN II-XII grossly intact.  Sensation and motor function intact in bilateral upper and lower extremities.  HEENT:   Head: no abrasions, lacerations or contusions. Skull intact. Midface stable to palpation   Eyes: equal round and reactive to light   Ears: no hemotympanum. No abrasions or lacerations   Nose: no abrasions or lacerations. No blood per nares   Oral Cavity: no blood noted. Dentition intact  Neck: soft, supple. Trachea midline. No abrasions, lacerations or contusions. No crepitus  Pulmonary: clear to auscultation bilaterally. External: no abrasions, lacerations or contusions. No crepitus.  Notable tenderness to palpation along right posterior lateral chest wall corresponding to injury area with some overlying hyperemia.  Cardiovascular:    Pulses: palpable bilateral radial, femoral, dorsalis pedis and posterior tibial arteries  Abdomen: soft, nondistended, nontender to palpation. No abrasions, lacerations or contusions  Pelvis/Perineum: pelvis stable to palpation. No pubic symphysis widening palpated. Perineum without ecchymosis  Musculoskeletal:    Back/Spine: nontender, no stepoffs. Back without abrasions, lacerations or contusions   Extremities: no abrasions, lacerations or contusions. No deformities or joint swelling       Relevant Results  Results for orders placed or performed during the hospital encounter of 10/17/23 (from the past 24 hour(s))   CBC and " Auto Differential   Result Value Ref Range    WBC 7.4 4.4 - 11.3 x10*3/uL    nRBC 0.0 0.0 - 0.0 /100 WBCs    RBC 4.94 4.00 - 5.20 x10*6/uL    Hemoglobin 14.6 12.0 - 16.0 g/dL    Hematocrit 46.5 (H) 36.0 - 46.0 %    MCV 94 80 - 100 fL    MCH 29.6 26.0 - 34.0 pg    MCHC 31.4 (L) 32.0 - 36.0 g/dL    RDW 14.4 11.5 - 14.5 %    Platelets 157 150 - 450 x10*3/uL    MPV 9.4 7.5 - 11.5 fL    Neutrophils % 81.8 40.0 - 80.0 %    Immature Granulocytes %, Automated 0.4 0.0 - 0.9 %    Lymphocytes % 8.0 13.0 - 44.0 %    Monocytes % 6.7 2.0 - 10.0 %    Eosinophils % 2.6 0.0 - 6.0 %    Basophils % 0.5 0.0 - 2.0 %    Neutrophils Absolute 6.01 (H) 1.60 - 5.50 x10*3/uL    Immature Granulocytes Absolute, Automated 0.03 0.00 - 0.50 x10*3/uL    Lymphocytes Absolute 0.59 (L) 0.80 - 3.00 x10*3/uL    Monocytes Absolute 0.49 0.05 - 0.80 x10*3/uL    Eosinophils Absolute 0.19 0.00 - 0.40 x10*3/uL    Basophils Absolute 0.04 0.00 - 0.10 x10*3/uL   Comprehensive Metabolic Panel   Result Value Ref Range    Glucose 106 (H) 74 - 99 mg/dL    Sodium 140 136 - 145 mmol/L    Potassium 3.5 3.5 - 5.3 mmol/L    Chloride 102 98 - 107 mmol/L    Bicarbonate 31 21 - 32 mmol/L    Anion Gap 11 10 - 20 mmol/L    Urea Nitrogen 18 6 - 23 mg/dL    Creatinine 0.93 0.50 - 1.05 mg/dL    eGFR 63 >60 mL/min/1.73m*2    Calcium 9.8 8.6 - 10.3 mg/dL    Albumin 4.5 3.4 - 5.0 g/dL    Alkaline Phosphatase 70 33 - 136 U/L    Total Protein 7.0 6.4 - 8.2 g/dL    AST 17 9 - 39 U/L    Bilirubin, Total 1.6 (H) 0.0 - 1.2 mg/dL    ALT 11 7 - 45 U/L   Protime-INR   Result Value Ref Range    Protime 28.6 (H) 9.8 - 12.8 seconds    INR 2.5 (H) 0.9 - 1.1   Alcohol   Result Value Ref Range    Alcohol <10 <=10 mg/dL   B-type natriuretic peptide   Result Value Ref Range     (H) 0 - 99 pg/mL   Troponin I, High Sensitivity, Initial   Result Value Ref Range    Troponin I, High Sensitivity 23 (H) 0 - 13 ng/L   Troponin I, High Sensitivity   Result Value Ref Range    Troponin I, High  Sensitivity 25 (H) 0 - 13 ng/L       CT chest abdomen pelvis w IV contrast    Result Date: 10/17/2023  Interpreted By:  Donato Huggins, STUDY: CT CHEST ABDOMEN PELVIS W IV CONTRAST;  10/17/2023 12:03 pm   INDICATION: Signs/Symptoms:fall, hit head on anticoagulation, R rib pain   COMPARISON: None.   ACCESSION NUMBER(S): [id number]   ORDERING CLINICIAN: FERNANDEZ ROBERTS   TECHNIQUE: CT of the chest, abdomen, and pelvis was performed. Contiguous axial images were obtained at  5 mm slice thickness through the chest, and at  3 mm through the abdomen and pelvis. Coronal and sagittal reconstructions at  3 mm slice thickness were performed. 90 cc Omnipaque 350 administered   FINDINGS: Body habitus limits sensitivity   CHEST: Heart size is significantly enlarged enlarged main pulmonary artery compatible pulmonary arterial hypertension. Features suggesting pulmonary edema with septal thickening as well as bronchial thickening. No pleural effusions. Small sliding hiatal hernia is identified. Pacemaker is seen. No pneumothorax No rib fracture is seen. Please note nondisplaced rib fractures can be radiographically occult     Evaluation of the abdomen and pelvis: Heterogeneous appearance of the liver. Query right heart dysfunction. No splenic or hepatic laceration. No aneurysm. Robust vascular calcification. 5.2 cm left midpole renal cyst. There are few small bilateral renal cysts also seen. Moderate vascular calcification. Small retroperitoneal lymph nodes are identified.   Diverticulosis.   Tiny umbilical hernia containing fat. Uterus is not seen and presumed removed. Low-lying urinary bladder. No inguinal adenopathy.   Chronic wedging of L1 and T12; less than 20% loss of height at T12 and probably 25% at L1. No retropulsion.       No definite rib fracture seen. Please note demineralization does limit sensitivity. Heart size is enlarged. Features suggesting pulmonary edema and right heart dysfunction.   Diverticulosis. No  diverticulitis. No evidence of intra-abdominal hemorrhage. Robust vascular calcification. Senescent changes.   A few scattered retroperitoneal lymph nodes. These are probably reactive as they are slightly increased in number although they are not pathologic in size criteria   Senescent changes including demineralization and chronic wedging of the lumbar spine   MACRO: None   Signed by: Donato Huggins 10/17/2023 12:23 PM Dictation workstation:   [id number]    XR tibia fibula right 2 views    Result Date: 10/17/2023  Interpreted By:  Dmitri Topete, STUDY: XR TIBIA FIBULA RIGHT 2 VIEWS   INDICATION: Signs/Symptoms:fall, pain.   COMPARISON: None   ACCESSION NUMBER(S): [id number]   ORDERING CLINICIAN: FERNANDEZ ROBERTS   FINDINGS: Right total knee arthroplasty grossly unremarkable. No evidence of right lower leg fracture.   Soft tissue swelling with numerous phleboliths and sheet like calcifications favoring chronic stasis.       No acute findings right lower leg.   Likely chronic venous stasis.   Signed by: Dmitri Topete 10/17/2023 12:21 PM Dictation workstation:   [id number]    XR chest 1 view    Result Date: 10/17/2023  Interpreted By:  Dmitri Topete, STUDY: XR CHEST 1 VIEW   INDICATION: Signs/Symptoms:trauma R rib pain.   COMPARISON: February 25, 2021   ACCESSION NUMBER(S): [id number]   ORDERING CLINICIAN: FERNANDEZ ROBERTS   FINDINGS: Interval development of new airspace opacities largest in the right lung base partially obscured by the pacemaker with other areas of the left upper lobe and right upper lobe as well.   Ribs not evaluated in a diagnostic manner.       New multifocal airspace disease right-greater-than-left. Findings could be multifocal pneumonia or given the history of trauma, pulmonary contusions.   Limited assessment of the chest wall provided by frontal radiography demonstrates no gross abnormality.   Signed by: Dmitri Topete 10/17/2023 12:20 PM Dictation workstation:   [id number]    CT cervical spine wo IV  contrast    Result Date: 10/17/2023  Interpreted By:  Donato Huggins, STUDY: CT HEAD WO IV CONTRAST; CT CERVICAL SPINE WO IV CONTRAST;  10/17/2023 12:00 pm   INDICATION: Signs/Symptoms:fall, hit head on anticoagulation, R rib pain   COMPARISON: None.   ACCESSION NUMBER(S): [id number]; [id number]   ORDERING CLINICIAN: EFRNANDEZ ROBERTS   TECHNIQUE: Axial noncontrast CT images of head with coronal and sagittal reconstructed images. Axial noncontrast CT images of the cervical spine with coronal and sagittal reconstructed images.   FINDINGS: CT HEAD:   Global volume loss. Mild chronic small vessel ischemic change. No mass effect or midline shift No hemorrhage or edema. No acute skull fracture.   Small amount of ethmoid and right sphenoid sinus mucosal thickening. Mastoid air cells are clear. Vascular calcifications are seen.   CT CERVICAL SPINE: Demineralization of the bones. No evidence of acute cervical spine fracture. Mild multilevel facet arthropathy. No high-grade central canal stenosis. No arthropathy.       Senescent changes. No evidence of acute intracranial hemorrhage. No findings of acute cervical spine fracture Mild sphenoid opacification.   Signed by: Donato Hugigns 10/17/2023 12:10 PM Dictation workstation:   [id number]    CT head wo IV contrast    Result Date: 10/17/2023  Interpreted By:  Donato Huggins, STUDY: CT HEAD WO IV CONTRAST; CT CERVICAL SPINE WO IV CONTRAST;  10/17/2023 12:00 pm   INDICATION: Signs/Symptoms:fall, hit head on anticoagulation, R rib pain   COMPARISON: None.   ACCESSION NUMBER(S): [id number]; [id number]   ORDERING CLINICIAN: FERNANDEZ ROBERTS   TECHNIQUE: Axial noncontrast CT images of head with coronal and sagittal reconstructed images. Axial noncontrast CT images of the cervical spine with coronal and sagittal reconstructed images.   FINDINGS: CT HEAD:   Global volume loss. Mild chronic small vessel ischemic change. No mass effect or midline shift No hemorrhage or edema. No acute skull  fracture.   Small amount of ethmoid and right sphenoid sinus mucosal thickening. Mastoid air cells are clear. Vascular calcifications are seen.   CT CERVICAL SPINE: Demineralization of the bones. No evidence of acute cervical spine fracture. Mild multilevel facet arthropathy. No high-grade central canal stenosis. No arthropathy.       Senescent changes. No evidence of acute intracranial hemorrhage. No findings of acute cervical spine fracture Mild sphenoid opacification.   Signed by: Donato Huggins 10/17/2023 12:10 PM Dictation workstation:   [id number]      Assessment and Plan  Principal Problem:    Fall (on) (from) other stairs and steps, initial encounter    78 y.o. female presenting with fall with chest wall injury.    Plan  -Will admit patient to trauma surgery service  -PT/OT evaluation  -Multimodal pain control with scheduled tylenol, robaxin, PRN oxycodone and morphine  - incentive spirometry; continuous pulse oximetry  -IMS consulted, appreciate recommendations, will place patient on telemetry on admission  -Monitor labs and vitals  -Cardiac diet  -Will discuss with Dr. Vamsi Saleem, PGY4  General Surgery      None
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
## 2024-08-02 NOTE — BH CONSULTATION LIAISON PROGRESS NOTE - NSBHASSESSMENTFT_PSY_ALL_CORE
Mr Pizarro is a 59 year old man with a history of depression who was admitted to the medical floor for the evaluation of Altered mental status  . According to the medical team, patient has significant marital stressors and was diagnosed with Depression on 7/15, was placed on Lexapro but has not been taking it . psychiatry consult was called for the evaluation of depressed mood and possible suicidal ideations . Patient was seen and initially considered not to be a danger to himself or others with the plan to follow up with outpatient psychiatric and psychotherapy services . However the  psychiatry team was recalled because patient was said to be making threatening statements to himself and to his wife .   Patient seems to have tolerated the Lexapro with no side effects . He seems to have worsening anxiety , likely in the context of not having his wife around him all the time, but refuse the offer of low dose Klonopin.    At this time, patient is considered a danger to himself and needs inpatient psychiatric hospitalization for medication management and symptoms stabilization.

## 2024-08-02 NOTE — PROGRESS NOTE ADULT - SUBJECTIVE AND OBJECTIVE BOX
JULIO CÉSAR DURANT  SI-N 3A 019 A ([facility]-N 3A)        Patient was evaluated and examined  by bedside, no active events over night as per covering nurse report, patient remains on 1 to 1 obs        REVIEW OF SYSTEMS:  please see pertinent positives mentioned above, all other 12 ROS negative      T(C): , Max: 36.6 (08-01-24 @ 12:00)  HR: 99 (08-02-24 @ 05:20)  BP: 118/74 (08-02-24 @ 05:20)  RR: 18 (08-02-24 @ 05:20)  SpO2: 97% (08-01-24 @ 12:00)  CAPILLARY BLOOD GLUCOSE          PHYSICAL EXAM:  General: NAD, AAOX3, patient is sitting  comfortably in bed  HEENT: AT, NC, Supple, NO JVD, NO CB  Lungs: CTA B/L, no wheezing, no rhonchi  CVS: normal S1, S2, RRR, NO M/G/R  Abdomen: soft, bowel sounds present, non-tender, non-distended  Extremities: no edema, no clubbing, no cyanosis, positive peripheral pulses b/l  Neuro: no acute focal neurological deficits  Skin: no rash, no ecchymosis      LAB  CBC  Date: 07-30-24 @ 09:54  Mean cell Gjhpyzepdr00.4  Mean cell Hemoglobin Conc33.9  Mean cell Volum 98.7  Platelet count-Automate 297  RBC Count 4.55  Red Cell Distrib Width12.9  WBC Count5.50  % Albumin, Urine--  Hematocrit 44.9  Hemoglobin 15.2  CBC  Date: 07-29-24 @ 10:25  Mean cell Hxehcjwarn24.4  Mean cell Hemoglobin Conc33.3  Mean cell Volum 100.5  Platelet count-Automate 268  RBC Count 4.28  Red Cell Distrib Width13.2  WBC Count5.31  % Albumin, Urine--  Hematocrit 43.0  Hemoglobin 14.3  CBC  Date: 07-28-24 @ 09:45  Mean cell Zzmvbrzyma94.1  Mean cell Hemoglobin Conc33.4  Mean cell Volum 99.0  Platelet count-Automate 234  RBC Count 4.02  Red Cell Distrib Width13.0  WBC Count4.22  % Albumin, Urine--  Hematocrit 39.8  Hemoglobin 13.3  CBC  Date: 07-27-24 @ 08:43  Mean cell Kgfqglqrdt32.5  Mean cell Hemoglobin Conc33.7  Mean cell Volum 99.3  Platelet count-Automate 260  RBC Count 4.30  Red Cell Distrib Width13.2  WBC Count3.90  % Albumin, Urine--  Hematocrit 42.7  Hemoglobin 14.4    BMP  07-30-24 @ 09:54  Blood Gas Arterial-Calcium,Ionized--  Blood Urea Nitrogen, Serum 9 mg/dL<L> [10 - 20]  Carbon Dioxide, Serum26 mmol/L [17 - 32]  Chloride, Yguhx816 mmol/L [98 - 110]  Creatinie, Serum0.9 mg/dL [0.7 - 1.5]  Glucose, Attqd336 mg/dL<H> [70 - 99]  Potassium, Serum4.1 mmol/L [3.5 - 5.0]  Sodium, Serum 140 mmol/L [135 - 146]  BMP  07-29-24 @ 10:25  Blood Gas Arterial-Calcium,Ionized--  Blood Urea Nitrogen, Serum 9 mg/dL<L> [10 - 20]  Carbon Dioxide, Serum22 mmol/L [17 - 32]  Chloride, Sxmuf402 mmol/L [98 - 110]  Creatinie, Serum0.9 mg/dL [0.7 - 1.5]  Glucose, Mnxyo959 mg/dL<H> [70 - 99]  Potassium, Serum4.4 mmol/L [3.5 - 5.0]  Sodium, Serum 138 mmol/L [135 - 146]              Microbiology:    Culture - Blood (collected 07-26-24 @ 13:15)  Source: .Blood Blood-Peripheral  Final Report (07-31-24 @ 21:00):    No growth at 5 days        Medications:  amLODIPine   Tablet 10 milliGRAM(s) Oral daily  aspirin enteric coated 81 milliGRAM(s) Oral daily  atorvastatin 10 milliGRAM(s) Oral at bedtime  chlorhexidine 2% Cloths 1 Application(s) Topical daily  escitalopram 10 milliGRAM(s) Oral daily  haloperidol     Tablet 2.5 milliGRAM(s) Oral every 6 hours PRN  LORazepam     Tablet 2 milliGRAM(s) Oral every 6 hours PRN        Assessment and Plan:  Patient is a 59 year old male with PMH of HTN and recent chronic lacunar infarct with no residual deficits diagnosed in July 2024 presenting with episodes of trembling and shaking associated with aphasia and impaired mobility. History complicated by multiple stressors and Pt is currently depressed with suicidal thoughts but no active plan. Pt was a stroke code in the ED but negative for stroke.  Pt was admitted with negative abnormal  work up post evaluation of behavioral therapist, recommended to transfer patient to IPP .     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     # mild elevated LFTs - close outpatient f/up     #Progress Note Handoff: Pending d/c to IPP today , as per Psych req, covid screen negative   Family discussion: yes, medical team Disposition: pending transfer to [facility] once bed is available    Total time spent to complete patient's bedside assessment, review medical chart, discuss medical plan of care with covering medical team was more than 35 minutes with >50% of time spent face to face with patient, discussion with patient/family and/or coordination of care .

## 2024-08-02 NOTE — DISCHARGE NOTE NURSING/CASE MANAGEMENT/SOCIAL WORK - PATIENT PORTAL LINK FT
You can access the FollowMyHealth Patient Portal offered by [facility] by registering at the following website: [email or web address]. By joining [facility]’s FollowMyHealth portal, you will also be able to view your health information using other applications (apps) compatible with our system.

## 2024-08-02 NOTE — PROGRESS NOTE ADULT - REASON FOR ADMISSION
AMS, cognitive impairment

## 2024-08-02 NOTE — BH CONSULTATION LIAISON PROGRESS NOTE - NSBHCONSULTMEDANXIETY_PSY_A_CORE FT
We recommend Atarax 50mg p.o Q 6 hours PRN for anxiety and insomnia

## 2024-08-02 NOTE — BH CONSULTATION LIAISON PROGRESS NOTE - NSBHCHARTREVIEWVS_PSY_A_CORE FT
Vital Signs Last 24 Hrs  T(C): 36.8 (31 Jul 2024 13:42), Max: 36.8 (31 Jul 2024 13:42)  T(F): 98.2 (31 Jul 2024 13:42), Max: 98.2 (31 Jul 2024 13:42)  HR: 87 (31 Jul 2024 13:42) (87 - 92)  BP: 112/74 (31 Jul 2024 13:42) (112/74 - 136/97)  BP(mean): --  RR: 18 (31 Jul 2024 13:42) (17 - 19)  SpO2: 98% (31 Jul 2024 13:42) (98% - 99%)    Parameters below as of 31 Jul 2024 13:42  Patient On (Oxygen Delivery Method): room air    
Vital Signs Last 24 Hrs  T(C): 36.3 (01 Aug 2024 06:04), Max: 36.8 (31 Jul 2024 13:42)  T(F): 97.4 (01 Aug 2024 06:04), Max: 98.3 (31 Jul 2024 21:15)  HR: 100 (01 Aug 2024 06:04) (87 - 100)  BP: 149/94 (01 Aug 2024 06:04) (112/74 - 149/94)  BP(mean): --  RR: 18 (31 Jul 2024 21:15) (18 - 18)  SpO2: 97% (01 Aug 2024 06:04) (97% - 98%)    Parameters below as of 31 Jul 2024 21:15  Patient On (Oxygen Delivery Method): room air    
Vital Signs Last 24 Hrs  T(C): 36.7 (30 Jul 2024 05:04), Max: 37.1 (29 Jul 2024 19:58)  T(F): 98 (30 Jul 2024 05:04), Max: 98.7 (29 Jul 2024 19:58)  HR: 89 (30 Jul 2024 05:04) (84 - 89)  BP: 148/96 (30 Jul 2024 05:04) (109/67 - 153/76)  BP(mean): --  RR: 18 (30 Jul 2024 05:04) (18 - 18)  SpO2: 98% (30 Jul 2024 05:04) (98% - 98%)    Parameters below as of 29 Jul 2024 19:58  Patient On (Oxygen Delivery Method): room air    
Vital Signs Last 24 Hrs  T(C): 36.3 (02 Aug 2024 05:20), Max: 36.6 (01 Aug 2024 12:00)  T(F): 97.3 (02 Aug 2024 05:20), Max: 97.9 (01 Aug 2024 12:00)  HR: 99 (02 Aug 2024 05:20) (97 - 99)  BP: 118/74 (02 Aug 2024 05:20) (109/75 - 118/74)  BP(mean): --  RR: 18 (02 Aug 2024 05:20) (18 - 18)  SpO2: 97% (01 Aug 2024 12:00) (97% - 97%)

## 2024-08-02 NOTE — BH INPATIENT PSYCHIATRY ASSESSMENT NOTE - OTHER PAST PSYCHIATRIC HISTORY (INCLUDE DETAILS REGARDING ONSET, COURSE OF ILLNESS, INPATIENT/OUTPATIENT TREATMENT)
no prior hospitalizations/IPP, no prior SA, was recently diagnosed with depression on 7/15/24 and placed on Lexapro but had not been taking it

## 2024-08-02 NOTE — BH INPATIENT PSYCHIATRY ASSESSMENT NOTE - NSBHASSESSSUMMFT_PSY_ALL_CORE
58 y/o man with a history of depression who was admitted to the medical floor for the evaluation of AMS with negative medical workup, and transferred to [facility] for further management of SI and threatening statements made to his wife I/s/o ongoing marital conflicts. Patient had admitted to infidelity to his wife resulting in marital stressors, was diagnosed with depression on 7/15 and placed on Lexapro but had not been taking it. Patient was seen and initially considered not to be a danger to himself or others with the plan to follow up with outpatient psychiatric and psychotherapy services, however the  psychiatry team was recalled because patient was said to be making threatening statements to his wife and again endorsing SI. His worsening mood and anxiety appears more likely in the context of fears of his wife leaving him. Pt will likely benefit from IPP at this time.    #Depression  #Anxiety  -c/w lexapro 10 mg daily  -encourage groups/DBT    #Agitation  -for agitation not amenable to verbal redirection, may give haldol 3 mg q6h prn and/or ativan 1 mg q6h prn prn with escalation to IM if pt is refusing PO and is an acute danger to self or/and others with repeat EKG to ensure QTc <500 ms 60 y/o man, resides with his wife and 2 adult sons, retired supervisor at [employer], with a PMH of HTN and recent chronic lacunar infarct with no residual deficits diagnosed in July 2024, and PPH of recent dx of depression who was admitted to the medical floor for the evaluation of AMS with negative medical workup, and transferred to IPP for further management of SI and threatening statements made to his wife I/s/o ongoing marital conflicts. Patient had admitted to infidelity to his wife resulting in marital stressors, was diagnosed with depression on 7/15 and placed on Lexapro but had not been taking it. Patient was seen and initially considered not to be a danger to himself or others with the plan to follow up with outpatient psychiatric and psychotherapy services, however the  psychiatry team was recalled because patient was said to be making threatening statements to his wife and again endorsing SI. His worsening mood and anxiety appears more likely in the context of fears of his wife leaving him. Pt will likely benefit from IPP at this time.    #Depression  #Anxiety  -c/w lexapro 10 mg daily  -encourage groups/DBT    #Agitation  -for agitation not amenable to verbal redirection, may give haldol 3 mg q6h prn and/or ativan 1 mg q6h prn prn with escalation to IM if pt is refusing PO and is an acute danger to self or/and others with repeat EKG to ensure QTc <500 ms

## 2024-08-02 NOTE — BH CONSULTATION LIAISON PROGRESS NOTE - NSBHINDICATION_PSY_ALL_CORE
Patient needs IPP admission for depression and suicidal ideations 

## 2024-08-02 NOTE — BH PATIENT PROFILE - HOME MEDICATIONS
escitalopram 10 mg oral tablet , 1 tab(s) orally once a day  Norvasc 5 mg oral tablet , 1 tab(s) orally once a day  atorvastatin 10 mg oral tablet , 1 tab(s) orally once a day (at bedtime)  aspirin 81 mg oral tablet , 1 tab(s) orally once a day  Aspirin Low Dose 81 mg oral delayed release tablet , 1 tab(s) orally once a day

## 2024-08-02 NOTE — BH CONSULTATION LIAISON PROGRESS NOTE - NSBHCONSULTRECOMMENDOTHER_PSY_A_CORE FT
1. We recommend continuing Lexapro 10mg P.O Q AM to target depression and anxiety   2. We recommend considering Klonopin 0.5mg P.O BID if patient is agreeable, ( so far patient refused 0   3. We recommend melatonin 5 –10 mg P.O bedtime to regulate sleep wake cycle   4. We recommend behavioral interventions, and environmental modifications to help patient's orientation and help him feel safe in addition to implementing delirium precautions as per nursing staff.   5. Patient can be transferred to the inpatient psychiatry floor upon bed availability  6.  The [facility] Psychiatry team will continue to follow while patient is on the medical floor

## 2024-08-02 NOTE — BH INPATIENT PSYCHIATRY ASSESSMENT NOTE - NSBHATTESTAPPBILLTIME_PSY_A_CORE
I attest my time as RENÉ is greater than 50% of the total combined time spent on qualifying patient care activities. I have reviewed and verified the documentation.

## 2024-08-02 NOTE — BH INPATIENT PSYCHIATRY ASSESSMENT NOTE - DESCRIPTION
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

## 2024-08-02 NOTE — BH INPATIENT PSYCHIATRY ASSESSMENT NOTE - NSBHCHARTREVIEWVS_PSY_A_CORE FT
Vital Signs Last 24 Hrs  T(C): 36.6 (08-02-24 @ 14:17), Max: 36.8 (08-02-24 @ 13:30)  T(F): 97.8 (08-02-24 @ 14:17), Max: 98.2 (08-02-24 @ 13:30)  HR: 97 (08-02-24 @ 14:17) (87 - 99)  BP: 109/74 (08-02-24 @ 14:17) (109/74 - 121/85)  BP(mean): --  RR: 16 (08-02-24 @ 14:17) (16 - 18)  SpO2: 98% (08-02-24 @ 13:30) (98% - 98%)
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
## 2024-08-02 NOTE — BH INPATIENT PSYCHIATRY ASSESSMENT NOTE - NSBHMSEIMPULSE_PSY_A_CORE
Unable to assess
N/A Patient is under age 18 and does not have a history of high risk behavior or is not high risk for Hep C

## 2024-08-02 NOTE — BH CONSULTATION LIAISON PROGRESS NOTE - NSBHATTESTBILLING_PSY_A_CORE
38832-Llnlbwcluj OBS or IP - low complexity OR 25-34 mins
98871-Zjgpwatmay OBS or IP - low complexity OR 25-34 mins
49591-Znstebaudt OBS or IP - low complexity OR 25-34 mins
03927-Lysshfjlkp OBS or IP - low complexity OR 25-34 mins

## 2024-08-02 NOTE — BH PATIENT PROFILE - PATIENT'S PREFERRED PRONOUN
Component      Latest Ref Rng & Units 2/14/2023   STREPTOCOCCUS GROUP A PCR      Not Detected Detected (A)     Strep PCR positive. Will start Azithromycin. RN to notify patient.    ROBERT Landers     Pt arrived from ER to room #[id number]. Pt with incont of BM. Pt noted to have a large unstageable wound to coccyx. Wound care consulted and specialty bed ordered. Pt alert to self. Pt orientated to room and call light.  Electronically signed by Jennifer Marquez RN on 2/15/2023 at 4:51 PM Him/He

## 2024-08-02 NOTE — BH CONSULTATION LIAISON PROGRESS NOTE - NSBHFUPINTERVALHXFT_PSY_A_CORE
Patient seen and interviewed , 1 to 1 at bedside   Upon approach, patient is observed to be pacing the hallway , appears very anxious, but responded to the redirection of 1 to 1 staff to go back into his room, he then sat down, and participated in the interview, no agitation nut appears anxious, speaking very softly and slowly , well oriented to time, place and person.  patient reports that he feels better,  slept for about 5 hours ,  but continues to feel depressed . He denies having current suicidal thoughts , intent or plan.   When informed that he seems to be very anxious and seems overly focused on his wife being around , patient states " yes ". Writer informed patient that while on the psychiatry floor , his wife will be able to see snow during specific visiting times and he needs to be be able to cope with the fact that she wont be around him all the time. he reports that he understands.   He reports that he took the Lexapro and denies having any side effects to the medication .   Patient was offered low does of Klonopin for anxiety ( 0.5mg BID ) however he refused , expressing his ambivalence about taking medication.  He was offered a one time dose of Ativan to help his anxiety but he continued to refuse .     According to the nurse taking care of patient , the night staff reports that patient did not sleep overnight. Nurse reports that overnight and this morning, patient has been asking for his wife, his clothes , asking where the exit is  and has been pacing . Nurse however reports that patient is not agitated and participated in nursing care .

## 2024-08-02 NOTE — BH CONSULTATION LIAISON PROGRESS NOTE - NSBHCONSULTPRIMARYDISCUSSYES_PSY_A_CORE FT
Medical team informed of the plan 
Medical team informed of the plan 
Medical resident taking care of patient informed of the plan 
Medical team informed of the plan

## 2024-08-02 NOTE — BH PATIENT PROFILE - FALL HARM RISK - HARM RISK INTERVENTIONS
Communicate Risk of Fall with Harm to all staff/Monitor gait and stability/Review medications for side effects contributing to fall risk/Visual Cue: Yellow wristband and red socks/Call bell, personal items and telephone in reach/Instruct patient to call for assistance before getting out of bed or chair/Non-slip footwear when patient is out of bed/[place] to call system/Physically safe environment - no spills, clutter or unnecessary equipment/Purposeful Proactive Rounding/Room/bathroom lighting operational, light cord in reach

## 2024-08-02 NOTE — BH INPATIENT PSYCHIATRY ASSESSMENT NOTE - CURRENT MEDICATION
MEDICATIONS  (STANDING):  amLODIPine   Tablet 5 milliGRAM(s) Oral daily  aspirin enteric coated 81 milliGRAM(s) Oral daily  atorvastatin 10 milliGRAM(s) Oral at bedtime  escitalopram 10 milliGRAM(s) Oral daily    MEDICATIONS  (PRN):  haloperidol     Tablet 3 milliGRAM(s) Oral every 8 hours PRN agitation  LORazepam     Tablet 1 milliGRAM(s) Oral every 8 hours PRN physical aggression   MEDICATIONS  (STANDING):  amLODIPine   Tablet 5 milliGRAM(s) Oral daily  aspirin enteric coated 81 milliGRAM(s) Oral daily  atorvastatin 10 milliGRAM(s) Oral at bedtime  escitalopram 10 milliGRAM(s) Oral daily    MEDICATIONS  (PRN):  haloperidol     Tablet 3 milliGRAM(s) Oral every 8 hours PRN agitation  hydrOXYzine hydrochloride 25 milliGRAM(s) Oral every 6 hours PRN anxiety/insomnia  LORazepam     Tablet 1 milliGRAM(s) Oral every 8 hours PRN physical aggression

## 2024-08-02 NOTE — BH CONSULTATION LIAISON PROGRESS NOTE - NSBHFUPREASONCONS_PSY_A_CORE
suicidality/anxiety/depression
agitation/other...

## 2024-08-02 NOTE — BH SOCIAL WORK INITIAL PSYCHOSOCIAL EVALUATION - OTHER PAST PSYCHIATRIC HISTORY (INCLUDE DETAILS REGARDING ONSET, COURSE OF ILLNESS, INPATIENT/OUTPATIENT TREATMENT)
Michael is a 59 year old male with a recent diagnosis of depression (7/15/24) after presenting to the ED, and discharged, approximately one week prior for anxiety and depressive symptoms.

## 2024-08-02 NOTE — PROGRESS NOTE ADULT - PROVIDER SPECIALTY LIST ADULT
Internal Medicine
Hospitalist
Hospitalist
Internal Medicine
Neurology

## 2024-08-02 NOTE — DISCHARGE NOTE NURSING/CASE MANAGEMENT/SOCIAL WORK - NSDCPEFALRISK_GEN_ALL_CORE
For information on Fall & Injury Prevention, visit: [email or web address] OR  [email or web address] OR  https://www.cdc.gov/steadi/patient.html
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
## 2024-08-02 NOTE — BH INPATIENT PSYCHIATRY ASSESSMENT NOTE - NSBHMSEMOOD_PSY_A_CORE
Problem: Patient Care Overview  Goal: Plan of Care Review  Outcome: Ongoing (interventions implemented as appropriate)  POC reviewed with pt who verbalized understanding. Pt AAOX4.  Remains free of falls and injury. VSS and afebrile.  C/o slight pain controlled with PRN medication. Encouraged pt repositioned to help decrease pressure. Pt sleeping throughout the night.  Infection control measures taken, such as hand washing and proper disposal of contaminated materials.Safety rounds maintained according to protocol, environmental consistency maintained. Mary Carrillo agrees to call when assistance is needed per call light which is within reach. No acute events. No distress noted. WCTM.          Anxious/Other Other

## 2024-08-03 LAB
ALBUMIN SERPL ELPH-MCNC: 4.5 G/DL — SIGNIFICANT CHANGE UP (ref 3.5–5.2)
ALP SERPL-CCNC: 76 U/L — SIGNIFICANT CHANGE UP (ref 30–115)
ALT FLD-CCNC: 134 U/L — HIGH (ref 0–41)
AST SERPL-CCNC: 72 U/L — HIGH (ref 0–41)
BILIRUB DIRECT SERPL-MCNC: <0.2 MG/DL — SIGNIFICANT CHANGE UP (ref 0–0.3)
BILIRUB INDIRECT FLD-MCNC: >0.4 MG/DL — SIGNIFICANT CHANGE UP (ref 0.2–1.2)
BILIRUB SERPL-MCNC: 0.6 MG/DL — SIGNIFICANT CHANGE UP (ref 0.2–1.2)
PROT SERPL-MCNC: 7.1 G/DL — SIGNIFICANT CHANGE UP (ref 6–8)

## 2024-08-03 RX ADMIN — ESCITALOPRAM OXALATE 10 MILLIGRAM(S): 10 TABLET ORAL at 08:21

## 2024-08-03 RX ADMIN — Medication 81 MILLIGRAM(S): at 08:21

## 2024-08-03 RX ADMIN — Medication 10 MILLIGRAM(S): at 20:29

## 2024-08-03 RX ADMIN — AMLODIPINE BESYLATE 5 MILLIGRAM(S): 10 TABLET ORAL at 08:21

## 2024-08-03 NOTE — BH INPATIENT PSYCHIATRY PROGRESS NOTE - NSBHCHARTREVIEWVS_PSY_A_CORE FT
Vital Signs Last 24 Hrs  T(C): 36.8 (08-03-24 @ 09:06), Max: 36.8 (08-02-24 @ 13:30)  T(F): 98.2 (08-03-24 @ 09:06), Max: 98.2 (08-02-24 @ 13:30)  HR: 116 (08-03-24 @ 09:06) (87 - 116)  BP: 111/81 (08-03-24 @ 09:06) (109/74 - 121/85)  BP(mean): --  RR: 18 (08-03-24 @ 09:06) (16 - 18)  SpO2: 98% (08-02-24 @ 13:30) (98% - 98%)     Vital Signs Last 24 Hrs  T(C): 36.8 (08-03-24 @ 09:06), Max: 36.8 (08-03-24 @ 09:06)  T(F): 98.2 (08-03-24 @ 09:06), Max: 98.2 (08-03-24 @ 09:06)  HR: 116 (08-03-24 @ 09:06) (116 - 116)  BP: 111/81 (08-03-24 @ 09:06) (111/81 - 111/81)  BP(mean): --  RR: 18 (08-03-24 @ 09:06) (18 - 18)  SpO2: --

## 2024-08-03 NOTE — BH INPATIENT PSYCHIATRY PROGRESS NOTE - PRN MEDS
MEDICATIONS  (PRN):  haloperidol     Tablet 3 milliGRAM(s) Oral every 8 hours PRN agitation  hydrOXYzine hydrochloride 25 milliGRAM(s) Oral every 6 hours PRN anxiety/insomnia  LORazepam     Tablet 1 milliGRAM(s) Oral every 8 hours PRN physical aggression

## 2024-08-03 NOTE — BH INPATIENT PSYCHIATRY PROGRESS NOTE - NSBHFUPINTERVALHXFT_PSY_A_CORE
Nursing reports no acute events overnight.     Chart reviewed, patient seen and evaluated in AM. Of note, patient has arms folded on interview, appears hunched over, furrowed brow, at times fidgeting. Patient is cooperative on interview, but gives brief answers to interview questions. When asked how he is doing, patient  "I'm feeling 5/10," which pt clarifies as feeling "so-so." Patient endorses OK sleep and appetite. Endorses some feelings of anxiety. Denies acute feelings of depression. Denies sxs of alexandro.    Patient reports no suicidal/homicidal ideation, intent or plan at this time. Denies auditory or visual hallucinations. Endorses paranoia "every other day" but endorses feeling safe on the unit.     Patient compliant with medications; reports no side effects of medications.

## 2024-08-03 NOTE — BH INPATIENT PSYCHIATRY PROGRESS NOTE - NSBHMETABOLIC_PSY_ALL_CORE_FT
BMI: BMI (kg/m2): 24 (08-02-24 @ 14:17)  HbA1c: A1C with Estimated Average Glucose Result: 5.6 % (07-05-24 @ 12:37)    Glucose: POCT Blood Glucose.: 82 mg/dL (07-25-24 @ 00:24)    BP: 111/81 (08-03-24 @ 09:06) (109/74 - 111/81)Vital Signs Last 24 Hrs  T(C): 36.8 (08-03-24 @ 09:06), Max: 36.8 (08-02-24 @ 13:30)  T(F): 98.2 (08-03-24 @ 09:06), Max: 98.2 (08-02-24 @ 13:30)  HR: 116 (08-03-24 @ 09:06) (87 - 116)  BP: 111/81 (08-03-24 @ 09:06) (109/74 - 121/85)  BP(mean): --  RR: 18 (08-03-24 @ 09:06) (16 - 18)  SpO2: 98% (08-02-24 @ 13:30) (98% - 98%)      Lipid Panel: Date/Time: 07-05-24 @ 12:37  Cholesterol, Serum: 126  LDL Cholesterol Calculated: 58  HDL Cholesterol, Serum: 52  Total Cholesterol/HDL Ration Measurement: --  Triglycerides, Serum: 77   BMI: BMI (kg/m2): 24 (08-02-24 @ 14:17)  HbA1c: A1C with Estimated Average Glucose Result: 5.6 % (07-05-24 @ 12:37)    Glucose: POCT Blood Glucose.: 82 mg/dL (07-25-24 @ 00:24)    BP: 111/81 (08-03-24 @ 09:06) (109/74 - 111/81)Vital Signs Last 24 Hrs  T(C): 36.8 (08-03-24 @ 09:06), Max: 36.8 (08-03-24 @ 09:06)  T(F): 98.2 (08-03-24 @ 09:06), Max: 98.2 (08-03-24 @ 09:06)  HR: 116 (08-03-24 @ 09:06) (116 - 116)  BP: 111/81 (08-03-24 @ 09:06) (111/81 - 111/81)  BP(mean): --  RR: 18 (08-03-24 @ 09:06) (18 - 18)  SpO2: --      Lipid Panel: Date/Time: 07-05-24 @ 12:37  Cholesterol, Serum: 126  LDL Cholesterol Calculated: 58  HDL Cholesterol, Serum: 52  Total Cholesterol/HDL Ration Measurement: --  Triglycerides, Serum: 77

## 2024-08-03 NOTE — BH INPATIENT PSYCHIATRY PROGRESS NOTE - CURRENT MEDICATION
MEDICATIONS  (STANDING):  amLODIPine   Tablet 5 milliGRAM(s) Oral daily  aspirin enteric coated 81 milliGRAM(s) Oral daily  atorvastatin 10 milliGRAM(s) Oral at bedtime  escitalopram 10 milliGRAM(s) Oral daily    MEDICATIONS  (PRN):  haloperidol     Tablet 3 milliGRAM(s) Oral every 8 hours PRN agitation  hydrOXYzine hydrochloride 25 milliGRAM(s) Oral every 6 hours PRN anxiety/insomnia  LORazepam     Tablet 1 milliGRAM(s) Oral every 8 hours PRN physical aggression

## 2024-08-03 NOTE — BH INPATIENT PSYCHIATRY PROGRESS NOTE - NSBHASSESSSUMMFT_PSY_ALL_CORE
60 y/o man, resides with his wife and 2 adult sons, retired supervisor at [employer], with a PMH of HTN and recent chronic lacunar infarct with no residual deficits diagnosed in July 2024, and PPH of recent dx of depression who was admitted to the medical floor for the evaluation of AMS with negative medical workup, and transferred to IPP for further management of SI and threatening statements made to his wife I/s/o ongoing marital conflicts. Patient had admitted to infidelity to his wife resulting in marital stressors, was diagnosed with depression on 7/15 and placed on Lexapro but had not been taking it. Patient was seen and initially considered not to be a danger to himself or others with the plan to follow up with outpatient psychiatric and psychotherapy services, however the  psychiatry team was recalled because patient was said to be making threatening statements to his wife and again endorsing SI. His worsening mood and anxiety appears more likely in the context of fears of his wife leaving him. Pt will likely benefit from IPP at this time.    #Depression  #Anxiety  -c/w lexapro 10 mg daily  -encourage groups/DBT    #Agitation  -for agitation not amenable to verbal redirection, may give haldol 3 mg q6h prn and/or ativan 1 mg q6h prn prn with escalation to IM if pt is refusing PO and is an acute danger to self or/and others with repeat EKG to ensure QTc <500 ms

## 2024-08-04 RX ADMIN — ESCITALOPRAM OXALATE 10 MILLIGRAM(S): 10 TABLET ORAL at 09:13

## 2024-08-04 RX ADMIN — AMLODIPINE BESYLATE 5 MILLIGRAM(S): 10 TABLET ORAL at 09:13

## 2024-08-04 RX ADMIN — Medication 81 MILLIGRAM(S): at 09:13

## 2024-08-04 RX ADMIN — Medication 10 MILLIGRAM(S): at 20:11

## 2024-08-04 NOTE — BH INPATIENT PSYCHIATRY PROGRESS NOTE - NSBHFUPINTERVALHXFT_PSY_A_CORE
Chart reviewed , discussed with staff and patient evaluated by  his bed side . No acute event overnight      Nursing reports no acute events overnight.    Patient is  superficially   cooperative on interview, but gives brief answers to interview questions. When asked how he is doing, patient  "I'm feeling ok ," which pt clarifies as feeling "so-so." Patient endorses OK sleep and appetite. Endorses some feelings of anxiety. Denies acute feelings of depression. Denies sxs of alexandro.    Patient reports no suicidal/homicidal ideation, intent or plan at this time. Denies auditory or visual hallucinations. He is guarded , evasive .    Patient compliant with medications; reports no side effects of medications.

## 2024-08-04 NOTE — CONSULT NOTE ADULT - ASSESSMENT
59 year old male with PMH of HTN and recent chronic lacunar infarct with no residual deficits diagnosed in July 2024 presenting with episodes of trembling and shaking associated with aphasia and impaired mobility. History complicated by multiple stressors and Pt is currently depressed with suicidal thoughts but no active plan. Pt was a stroke code in the ED but negative for stroke.  Pt was admitted with negative abnormal  work up post evaluation of behavioral therapist, recommended to transfer patient to P .       #hx of lacunar infarct -MRI head with chronic microvascular changes, continue asa, statins tx,   -EEG normal  -B12, folate, TSH normal, RPR and HIV levels neg    # Severe recurrent depression without psychotic features / # Anxiety   -Management as per the psych team.     # HTN - well controlled on daily Norvasc tx.     # mild elevated LFTs - close outpatient f/up  - If continue to rise, will consider stopping statin.     Recall Medicine As needed.

## 2024-08-04 NOTE — BH INPATIENT PSYCHIATRY PROGRESS NOTE - NSBHMETABOLIC_PSY_ALL_CORE_FT
BMI: BMI (kg/m2): 24 (08-02-24 @ 14:17)  HbA1c: A1C with Estimated Average Glucose Result: 5.6 % (07-05-24 @ 12:37)    Glucose: POCT Blood Glucose.: 82 mg/dL (07-25-24 @ 00:24)    BP: 109/76 (08-04-24 @ 08:45) (109/74 - 139/93)Vital Signs Last 24 Hrs  T(C): 36.7 (08-04-24 @ 08:45), Max: 36.9 (08-03-24 @ 16:00)  T(F): 98 (08-04-24 @ 08:45), Max: 98.4 (08-03-24 @ 16:00)  HR: 98 (08-03-24 @ 16:00) (98 - 98)  BP: 109/76 (08-04-24 @ 08:45) (109/76 - 139/93)  BP(mean): --  RR: 18 (08-04-24 @ 08:45) (18 - 18)  SpO2: --      Lipid Panel: Date/Time: 07-05-24 @ 12:37  Cholesterol, Serum: 126  LDL Cholesterol Calculated: 58  HDL Cholesterol, Serum: 52  Total Cholesterol/HDL Ration Measurement: --  Triglycerides, Serum: 77

## 2024-08-04 NOTE — BH INPATIENT PSYCHIATRY PROGRESS NOTE - NSBHCHARTREVIEWVS_PSY_A_CORE FT
Vital Signs Last 24 Hrs  T(C): 36.7 (08-04-24 @ 08:45), Max: 36.9 (08-03-24 @ 16:00)  T(F): 98 (08-04-24 @ 08:45), Max: 98.4 (08-03-24 @ 16:00)  HR: 98 (08-03-24 @ 16:00) (98 - 98)  BP: 109/76 (08-04-24 @ 08:45) (109/76 - 139/93)  BP(mean): --  RR: 18 (08-04-24 @ 08:45) (18 - 18)  SpO2: --

## 2024-08-04 NOTE — CONSULT NOTE ADULT - SUBJECTIVE AND OBJECTIVE BOX
JULIO CÉSAR DURANT  59y, Male    LOS  2d    HPI  HPI:  59 year old male with PMH of HTN and chronic lacunar infarct with no residual deficits diagnosed in July 2024 presenting with episodes of trembling and shaking associated with aphasia and impaired mobility. History complicated by multiple stressors and Pt is currently depressed with suicidal thoughts but no active plan. Pt was a stroke code in the ED but negative for stroke. All workup noted negative and now admitted to [facility].     Prior hospital charts reviewed [Yes]  Primary team notes reviewed [Yes]  Other consultant notes reviewed [Yes]    REVIEW OF SYSTEMS:  CONSTITUTIONAL: No fever or chills  HEENT: No sore throat  RESPIRATORY: No cough, no shortness of breath  CARDIOVASCULAR: No chest pain or palpitations  GASTROINTESTINAL: No abdominal or epigastric pain  GENITOURINARY: No dysuria  NEUROLOGICAL: No headache/dizziness  MSK: No joint pain, erythema, or swelling; no back pain  SKIN: No itching, rashes  All other ROS negative except noted above    PAST MEDICAL & SURGICAL HISTORY:  HTN (hypertension)    SOCIAL HISTORY:  - No recent travel    FAMILY HISTORY: No pertinent PMH for first degree relatives.     ANTIMICROBIALS:      ANTIMICROBIALS (past 90 days):  MEDICATIONS  (STANDING):        OTHER MEDS:   MEDICATIONS  (STANDING):  amLODIPine   Tablet 5 daily  aspirin enteric coated 81 daily  atorvastatin 10 at bedtime  escitalopram 10 daily  haloperidol     Tablet 3 every 8 hours PRN  hydrOXYzine hydrochloride 25 every 6 hours PRN  LORazepam     Tablet 1 every 8 hours PRN      VITALS:  Vital Signs Last 24 Hrs  T(F): 98 (08-04-24 @ 08:45), Max: 98.7 (07-29-24 @ 19:58)    Vital Signs Last 24 Hrs  HR: 98 (08-03-24 @ 16:00) (98 - 98)  BP: 109/76 (08-04-24 @ 08:45) (109/76 - 139/93)  RR: 18 (08-04-24 @ 08:45)  SpO2: --  Wt(kg): --    EXAM:  General: NAD, AAOX3  HEENT: AT, NC, Supple, NO JVD, NO CB  Lungs: CTA B/L, no wheezing, no rhonchi  CVS: normal S1, S2  Abdomen: soft, bowel sounds present  Extremities: no edema  Neuro: no acute focal neurological deficits  Skin: no rash, no ecchymosis    Labs:        TPro  7.1  /  Alb  4.5  /  TBili  0.6  /  DBili  <0.2  /  AST  72<H>  /  ALT  134<H>  /  AlkPhos  76  08-03      WBC Trend:      Auto Neutrophil #: 4.16 K/uL (07-30-24 @ 09:54)  Auto Neutrophil #: 5.02 K/uL (07-25-24 @ 00:26)  Auto Neutrophil #: 4.07 K/uL (07-15-24 @ 16:10)  Auto Neutrophil #: 3.62 K/uL (07-05-24 @ 07:43)      Creatine Trend:  Creatinine: 0.9 (07-30)  Creatinine: 0.9 (07-29)      Liver Biochemical Testing Trend:  Alanine Aminotransferase (ALT/SGPT): 134 *H* (08-03)  Alanine Aminotransferase (ALT/SGPT): 68 *H* (07-30)  Alanine Aminotransferase (ALT/SGPT): 39 (07-26)  Alanine Aminotransferase (ALT/SGPT): 43 *H* (07-25)  Alanine Aminotransferase (ALT/SGPT): 19 (07-05)  Aspartate Aminotransferase (AST/SGOT): 72 (08-03-24 @ 08:23)  Aspartate Aminotransferase (AST/SGOT): 69 (07-30-24 @ 09:54)  Aspartate Aminotransferase (AST/SGOT): 61 (07-26-24 @ 09:52)  Aspartate Aminotransferase (AST/SGOT): 119 (07-25-24 @ 00:26)  Aspartate Aminotransferase (AST/SGOT): 26 (07-05-24 @ 07:43)  Bilirubin Direct: <0.2 (08-03)  Bilirubin Total: 0.6 (08-03)  Bilirubin Total: 0.4 (07-30)  Bilirubin Total: 0.8 (07-26)  Bilirubin Total: 0.8 (07-25)      Trend LDH              MICROBIOLOGY:    Male          HIV-1/2 Combo Result: Nonreact (07-30-24 @ 09:54)        Treponema Pallidum Antibody Interpretation: Negative (07-30-24 @ 09:54)                                      A1C with Estimated Average Glucose Result: 5.6 % (07-05-24 @ 12:37)      INFLAMMATORY MARKERS      RADIOLOGY & ADDITIONAL TESTS:  I have personally reviewed the imagings.  CXR      CT  < from: US Abdomen Upper Quadrant Right (07.29.24 @ 14:58) >    FINDINGS:  Liver: Within normal limits.  Bile ducts: Normal caliber. Common bile duct measures 4 mm.  Gallbladder: Within normal limits. Negative sonographic Tidwell's sign.  Pancreas: Visualized portions are within normal limits.  Right kidney: 10.1 cm. No hydronephrosis.  Ascites: None.  IVC: Visualized portions are within normal limits.    IMPRESSION:    No acute sonographic abnormality.        --- End of Report ---    < end of copied text >  < from: MR Head No Cont (07.25.24 @ 11:39) >    IMPRESSION:    1.  No acute infarct or intracranial hemorrhage. Stable exam.    2.  Stable minimal chronic microvascular changes.    3.  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    --- End of Report ---    < end of copied text >  < from: CT Head No Cont (07.05.24 @ 08:25) >  FINDINGS:    The ventricles and sulci are unremarkable in appearance. Gray-white   differentiation appears well maintained.    There is a focal hypodensity noted within the anterior limb of the right   internal capsule, series 2 image 11. There is no intraparenchymal   hematoma, mass effect or midlineshift. No abnormal extra-axial fluid   collections are present.    The calvarium is intact. The visualized intraorbital compartments,   paranasal sinuses and mastoid complexes appear free of acute disease.    IMPRESSION:  Age indeterminate lacunar infarct involving the anterior limb of the   right external capsule. An MRI of the brain can be obtained for further   evaluation.    --- End of Report ---      < end of copied text >      CARDIOLOGY TESTING  12 Lead ECG:   Ventricular Rate 72 BPM    Atrial Rate 72 BPM    P-R Interval 102 ms    QRS Duration 80 ms    Q-T Interval 372 ms    QTC Calculation(Bazett) 407 ms    P Axis 76 degrees    R Axis 5 degrees    T Axis 67 degrees    Diagnosis Line Sinus rhythm with short ID  Borderline ECG    Confirmed by Gene Nelson ([id number]) on 7/25/2024 8:14:31 AM (07-25-24 @ 06:42)

## 2024-08-04 NOTE — CONSULT NOTE ADULT - TIME BILLING
On this date of service, level of risk to patient is considered: Moderate.   Reviewed consultant notes  Reviewed labs     I have personally seen and examined this patient.    I have reviewed all pertinent clinical information and reviewed all relevant imaging and diagnostic studies personally.   I discussed recommendations with the primary team.I discussed recommendations with the primary team.

## 2024-08-04 NOTE — BH INPATIENT PSYCHIATRY PROGRESS NOTE - NSBHASSESSSUMMFT_PSY_ALL_CORE
58 y/o man, resides with his wife and 2 adult sons, retired supervisor at [employer], with a PMH of HTN and recent chronic lacunar infarct with no residual deficits diagnosed in July 2024, and PPH of recent dx of depression who was admitted to the medical floor for the evaluation of AMS with negative medical workup, and transferred to IPP for further management of SI and threatening statements made to his wife I/s/o ongoing marital conflicts. Patient had admitted to infidelity to his wife resulting in marital stressors, was diagnosed with depression on 7/15 and placed on Lexapro but had not been taking it. Patient was seen and initially considered not to be a danger to himself or others with the plan to follow up with outpatient psychiatric and psychotherapy services, however the  psychiatry team was recalled because patient was said to be making threatening statements to his wife and again endorsing SI. His worsening mood and anxiety appears more likely in the context of fears of his wife leaving him. Pt will likely benefit from IPP at this time.    #Depression  #Anxiety  -c/w lexapro 10 mg daily  -encourage groups/DBT    #Agitation  -for agitation not amenable to verbal redirection, may give haldol 3 mg q6h prn and/or ativan 1 mg q6h prn prn with escalation to IM if pt is refusing PO and is an acute danger to self or/and others with repeat EKG to ensure QTc <500 ms

## 2024-08-05 PROCEDURE — 99232 SBSQ HOSP IP/OBS MODERATE 35: CPT

## 2024-08-05 RX ORDER — OLANZAPINE 7.5 MG/1
2.5 TABLET ORAL AT BEDTIME
Refills: 0 | Status: DISCONTINUED | OUTPATIENT
Start: 2024-08-05 | End: 2024-08-06

## 2024-08-05 RX ORDER — OLANZAPINE 7.5 MG/1
2.5 TABLET ORAL EVERY 6 HOURS
Refills: 0 | Status: DISCONTINUED | OUTPATIENT
Start: 2024-08-05 | End: 2024-08-21

## 2024-08-05 RX ADMIN — Medication 81 MILLIGRAM(S): at 08:45

## 2024-08-05 RX ADMIN — AMLODIPINE BESYLATE 5 MILLIGRAM(S): 10 TABLET ORAL at 08:46

## 2024-08-05 RX ADMIN — Medication 10 MILLIGRAM(S): at 20:09

## 2024-08-05 RX ADMIN — OLANZAPINE 2.5 MILLIGRAM(S): 7.5 TABLET ORAL at 20:09

## 2024-08-05 RX ADMIN — ESCITALOPRAM OXALATE 10 MILLIGRAM(S): 10 TABLET ORAL at 08:46

## 2024-08-05 NOTE — BH INPATIENT PSYCHIATRY PROGRESS NOTE - NSICDXBHPRIMARYDX_PSY_ALL_CORE
Depression, unspecified depression type   F32.A   Major depressive disorder with psychotic features   F32.3

## 2024-08-05 NOTE — BH TREATMENT PLAN - NSTXFALLINTERRN_PSY_ALL_CORE
Answer call for assistance promtly.  Regular rounding during shift.   Maintain a clutter free environment.

## 2024-08-05 NOTE — UM REPORT PROGRESS NOTE - NSUMRMPROVIDER_GEN_A_CORE FT
[facility] ([facility])  ID# [id number]  [phone] option 3    8/5- Spoke with Clara CHRISTENSEN from [facility]. Approved 31 days from 8/2-9/1. Auth# [id number]. CM assigned to Gm ([phone]) [facility] ([facility])  ID# [id number]  [phone] option 3    8/5- Spoke with Clara CHRISTENSEN from [facility]. Approved 31 days from 8/2-9/1. Auth# [id number]. CM assigned to Gm ([phone])  8/21 Sw called to leave D/C will have to call in again for IOP auth.

## 2024-08-05 NOTE — PSYCHIATRIC REHAB INITIAL EVALUATION - NSBHPRTOOLBOX_PSY_ALL_CORE
Pt's wife Kianna states that she took pt to seek outpatient psych services at [facility] where pt was prescribed lexapro for recent dx of depression/Entertainment/Family support/Treatment team provider support

## 2024-08-05 NOTE — BH INPATIENT PSYCHIATRY PROGRESS NOTE - NSBHCHARTREVIEWVS_PSY_A_CORE FT
Vital Signs Last 24 Hrs  T(C): 36.7 (08-05-24 @ 10:03), Max: 36.7 (08-04-24 @ 16:32)  T(F): 98 (08-05-24 @ 10:03), Max: 98 (08-04-24 @ 16:32)  HR: 81 (08-05-24 @ 10:03) (81 - 102)  BP: 122/87 (08-05-24 @ 10:03) (122/87 - 135/91)  BP(mean): --  RR: 18 (08-05-24 @ 10:03) (18 - 18)  SpO2: --

## 2024-08-05 NOTE — BH INPATIENT PSYCHIATRY PROGRESS NOTE - CURRENT MEDICATION
MEDICATIONS  (STANDING):  amLODIPine   Tablet 5 milliGRAM(s) Oral daily  aspirin enteric coated 81 milliGRAM(s) Oral daily  atorvastatin 10 milliGRAM(s) Oral at bedtime  escitalopram 10 milliGRAM(s) Oral daily    MEDICATIONS  (PRN):  haloperidol     Tablet 3 milliGRAM(s) Oral every 8 hours PRN agitation  hydrOXYzine hydrochloride 25 milliGRAM(s) Oral every 6 hours PRN anxiety/insomnia  LORazepam     Tablet 1 milliGRAM(s) Oral every 8 hours PRN physical aggression   MEDICATIONS  (STANDING):  amLODIPine   Tablet 5 milliGRAM(s) Oral daily  aspirin enteric coated 81 milliGRAM(s) Oral daily  atorvastatin 10 milliGRAM(s) Oral at bedtime  escitalopram 10 milliGRAM(s) Oral daily  OLANZapine 2.5 milliGRAM(s) Oral at bedtime    MEDICATIONS  (PRN):  haloperidol     Tablet 3 milliGRAM(s) Oral every 8 hours PRN agitation  hydrOXYzine hydrochloride 25 milliGRAM(s) Oral every 6 hours PRN anxiety/insomnia  LORazepam     Tablet 1 milliGRAM(s) Oral every 8 hours PRN physical aggression   MEDICATIONS  (STANDING):  amLODIPine   Tablet 5 milliGRAM(s) Oral daily  aspirin enteric coated 81 milliGRAM(s) Oral daily  atorvastatin 10 milliGRAM(s) Oral at bedtime  escitalopram 10 milliGRAM(s) Oral daily  OLANZapine 2.5 milliGRAM(s) Oral at bedtime    MEDICATIONS  (PRN):  hydrOXYzine hydrochloride 25 milliGRAM(s) Oral every 6 hours PRN anxiety/insomnia  LORazepam     Tablet 1 milliGRAM(s) Oral every 8 hours PRN physical aggression  OLANZapine 2.5 milliGRAM(s) Oral every 6 hours PRN agitation

## 2024-08-05 NOTE — BH CHART NOTE - NSPSYPRGNOTEFT_PSY_ALL_CORE
Interval Chief Complaint: " Im okay"     Interval History: Chart reviewed , discussed with staff and patient evaluated by  his bed side . No acute event overnight      Nursing reports no acute events overnight.   Patient is  superficially   cooperative on interview, but gives brief answers to interview questions. When asked how he is doing, patient  "I'm feeling ok ," which pt clarifies as feeling "so-so." Patient endorses OK sleep and appetite. Endorses some feelings of anxiety. Denies acute feelings of depression. Denies sxs of laexandro.  Patient reports no suicidal/homicidal ideation, intent or plan at this time. Denies auditory or visual hallucinations. He is guarded , evasive .  Patient compliant with medications; reports no side effects of medications.                Mental Status Exam:  Level of Consciousness: Alert  General Appearance: No deformities present  Body Habitus: Average build  Hygiene: Fair  Grooming: Fair  Behavior: Cooperative  Eye Contact: Poor  Relatedness: Unable to assess  Impulse Control	Unable to assess  Muscle Tone/Strength	Unable to assess  Abnormal Movements	No abnormal movements  Gait/Station	Unable to assess  Speech	Abnormal as indicated, otherwise normal...  Speech Abnormality	Soft volume, Slowed rate  Mood	Other  Other	"better"  Affect Quality	Depressed  Affect Range	Blunted  Affect Congruence	Congruent  Thought Process	Unable to assess  Thought Associations	Unable to assess  Thought Content	Unable to assess  Perceptions	Unable to assess  Orientation	Unable to assess  Attention/Concentration	Impaired  Estimated Intelligence	Unable to assess  Recent Memory	Unable to assess  Remote Memory	Unable to assess  Fund of Knowledge	Unable to assess  Language	No abnormalities noted  Judgment: Fair  Insight: Fair    Assessment Summary: 58 y/o man, resides with his wife and 2 adult sons, retired supervisor at [employer], with a PMH of HTN and recent chronic lacunar infarct with no residual deficits diagnosed in July 2024, and PPH of recent dx of depression who was admitted to the medical floor for the evaluation of AMS with negative medical workup, and transferred to [facility] for further management of SI and threatening statements made to his wife I/s/o ongoing marital conflicts.   Patient had admitted to infidelity to his wife resulting in marital stressors, was diagnosed with depression on 7/15 and placed on Lexapro but had not been taking it.     On evaluation Patient was seen and initially considered not to be a danger to himself or others with the plan to follow up with outpatient psychiatric and psychotherapy services, however the  psychiatry team was recalled because patient was said to be making threatening statements to his wife and again endorsing SI. His worsening mood and anxiety appears more likely in the context of fears of his wife leaving him. Pt will likely benefit from IPP at this time.    #Depression  #Anxiety  -c/w lexapro 10 mg daily  -encourage groups/DBT    #Agitation  -for agitation not amenable to verbal redirection, may give haldol 3 mg q6h prn and/or ativan 1 mg q6h prn prn with escalation to IM if pt is refusing PO and is an acute danger to self or/and others with repeat EKG to ensure QTc <500 ms   Interval Chief Complaint: " Im okay"     Interval History: Chart reviewed , discussed with staff and patient evaluated by  his bed side . No acute event overnight      Nursing reports no acute events overnight.   Patient is  superficially   cooperative on interview, but gives brief answers to interview questions. When asked how he is doing, patient  "I'm feeling ok ," which pt clarifies as feeling "so-so." Patient endorses OK sleep and appetite. Endorses some feelings of anxiety. Denies acute feelings of depression. Denies sxs of alexandro.  Patient reports no suicidal/homicidal ideation, intent or plan at this time. Denies auditory or visual hallucinations. He is guarded , evasive .  Patient compliant with medications; reports no side effects of medications.                Mental Status Exam:  Level of Consciousness: Alert  General Appearance: No deformities present  Body Habitus: Average build  Hygiene: Fair  Grooming: Fair  Behavior: Cooperative  Eye Contact: Poor  Impulse Control: Unable to assess  Muscle Tone/Strength: Unable to assess  Abnormal Movements: No abnormal movements  Gait/Station: Unable to assess  Speech: Normal  Speech: Soft volume, Slowed rate  Mood: Other  Affect Quality: Depressed  Affect Range: Blunted  Affect Congruence: Congruent  Thought Process: Unable to assess  Thought Associations	Unable to assess  Thought Content	Unable to assess  Perceptions	Unable to assess  Orientation	Unable to assess  Attention/Concentration	Impaired  Estimated Intelligence	Unable to assess  Recent Memory	Unable to assess  Remote Memory	Unable to assess  Fund of Knowledge	Unable to assess  Language	No abnormalities noted  Judgment: Fair  Insight: Fair    Assessment Summary: 60 y/o man, resides with his wife and 2 adult sons, retired supervisor at [employer], with a PMH of HTN and recent chronic lacunar infarct with no residual deficits diagnosed in July 2024, and PPH of recent dx of depression who was admitted to the medical floor for the evaluation of AMS with negative medical workup, and transferred to [facility] for further management of SI and threatening statements made to his wife I/s/o ongoing marital conflicts.   Patient had admitted to infidelity to his wife resulting in marital stressors, was diagnosed with depression on 7/15 and placed on Lexapro but had not been taking it.     On evaluation Patient was seen and initially considered not to be a danger to himself or others with the plan to follow up with outpatient psychiatric and psychotherapy services, however the  psychiatry team was recalled because patient was said to be making threatening statements to his wife and again endorsing SI. His worsening mood and anxiety appears more likely in the context of fears of his wife leaving him. Pt will likely benefit from IPP at this time.    #Depression  #Anxiety  -c/w lexapro 10 mg daily  -encourage groups/DBT    #Agitation  -for agitation not amenable to verbal redirection, may give haldol 3 mg q6h prn and/or ativan 1 mg q6h prn prn with escalation to IM if pt is refusing PO and is an acute danger to self or/and others with repeat EKG to ensure QTc <500 ms   Interval Chief Complaint: " Im okay"     Interval History: Chart reviewed , discussed with staff and patient evaluated by  his bed side . No acute event overnight      Nursing reports no acute events overnight. Patient is  superficially   cooperative on interview, but gives brief answers to interview questions. When asked how he is doing, patient  "I'm feeling ok ," which pt clarifies as feeling "so-so." Patient endorses OK sleep and appetite. Endorses some feelings of anxiety. Denies acute feelings of depression. Denies sxs of alexandro.  Patient reports no suicidal/homicidal ideation, intent or plan at this time. Denies auditory or visual hallucinations. He is guarded , evasive .  Patient compliant with medications; reports no side effects of medications.    Mental Status Exam:  Level of Consciousness: Alert  General Appearance: No deformities present  Body Habitus: Average build  Hygiene: Fair  Grooming: Fair  Behavior: Cooperative  Eye Contact: Poor  Impulse Control: Unable to assess  Muscle Tone/Strength: Unable to assess  Abnormal Movements: No abnormal movements  Gait/Station: Unable to assess  Speech: Normal  Speech: Soft volume, Slowed rate  Mood: Normal  Affect Quality: Depressed  Affect Range: Blunted  Affect Congruence: Congruent  Thought Process: Unable to assess  Thought Associations: Normal  Thought Content: Normal   Perceptions: No abnormalities   Orientation: Oriented to time, place, person  Attention/Concentration: Normal  Estimated Intelligence: Average   Recent Memory: Normal  Remote Memory: Normal   Fund of Knowledge: Normal   Language: No abnormalities noted  Judgment: Fair  Insight: Fair    Assessment Summary: 60 y/o man, resides with his wife and 2 adult sons, retired supervisor at [employer], with a PMH of HTN and recent chronic lacunar infarct with no residual deficits diagnosed in July 2024, and PPH of recent dx of depression who was admitted to the medical floor for the evaluation of AMS with negative medical workup, and transferred to [facility] for further management of SI and threatening statements made to his wife I/s/o ongoing marital conflicts.   Patient had admitted to infidelity to his wife resulting in marital stressors, was diagnosed with depression on 7/15 and placed on Lexapro but had not been taking it.     On evaluation Patient was seen and initially considered not to be a danger to himself or others with the plan to follow up with outpatient psychiatric and psychotherapy services, however the  psychiatry team was recalled because patient was said to be making threatening statements to his wife and again endorsing SI. His worsening mood and anxiety appears more likely in the context of fears of his wife leaving him. Pt will likely benefit from IPP at this time.    #Depression  #Anxiety  -c/w lexapro 10 mg daily  -encourage groups/DBT    #Agitation  -for agitation not amenable to verbal redirection, may give haldol 3 mg q6h prn and/or ativan 1 mg q6h prn prn with escalation to IM if pt is refusing PO and is an acute danger to self or/and others with repeat EKG to ensure QTc <500 ms   Interval Chief Complaint: " Im feeling okay"     Interval History: Pt seen and evaluated, chart reviewed. As per nursing report, no acute events overnight, no PRNs. On evaluation, pt presents pleasant, calm and cooperative. He endorses some anxiety and paranoia. He reports an okay mood, sleep, and appetite. He denies AH or VH. He denies his life is in danger or others are trying to harm/follow him. Pt does not agree to an additional medication for paranoia, will discuss with pt again. No side effects or complaints of current medication. No overt delusions elicited. He denies SI/HI, intent and plan. However, spouse reports patient threatening to endorse suicide.     Mental Status Exam:  Level of Consciousness: Alert  General Appearance: No deformities present  Body Habitus: Average build  Hygiene: Fair  Grooming: Fair  Behavior: Cooperative  Eye Contact: Poor  Impulse Control: Unable to assess  Muscle Tone/Strength: Unable to assess  Abnormal Movements: No abnormal movements  Gait/Station: Unable to assess  Speech: Normal  Speech: Soft volume, Slowed rate  Mood: Normal  Affect Quality: Depressed  Affect Range: Blunted  Affect Congruence: Congruent  Thought Process: Unable to assess  Thought Associations: Normal  Thought Content: Normal   Perceptions: No abnormalities   Orientation: Oriented to time, place, person  Attention/Concentration: Normal  Estimated Intelligence: Average   Recent Memory: Normal  Remote Memory: Normal   Fund of Knowledge: Normal   Language: No abnormalities noted  Judgment: Fair  Insight: Fair    Assessment Summary: 60 y/o man, resides with his wife and 2 adult sons, retired supervisor at [employer], with a PMH of HTN and recent chronic lacunar infarct with no residual deficits diagnosed in July 2024, and PPH of recent dx of depression who was admitted to the medical floor for the evaluation of AMS with negative medical workup, and transferred to [facility] for further management of SI and threatening statements made to his wife I/s/o ongoing marital conflicts.   Patient had admitted to infidelity to his wife resulting in marital stressors, was diagnosed with depression on 7/15 and placed on Lexapro but had not been taking it.     On evaluation patient was seen and initially considered not to be a danger to himself or others with the plan to follow up with outpatient psychiatric and psychotherapy services, however the  psychiatry team was recalled because patient was said to be making threatening statements to his wife and again endorsing SI. His worsening mood and anxiety appears more likely in the context of fears of his wife leaving him. Pt will likely benefit from IPP at this time.    #Depression  #Anxiety  -c/w lexapro 10 mg daily  -encourage groups/DBT    #Agitation  -for agitation not amenable to verbal redirection, may give haldol 3 mg q6h prn and/or ativan 1 mg q6h prn prn with escalation to IM if pt is refusing PO and is an acute danger to self or/and others with repeat EKG to ensure QTc <500 ms   Interval Chief Complaint: " Im feeling okay"     Interval History: Pt seen and evaluated, chart reviewed. As per nursing report, no acute events overnight, no PRNs. On evaluation, pt presents pleasant, calm and cooperative. He endorses some anxiety and paranoia. He reports an okay mood, sleep, and appetite. He denies AH or VH. He reports others are trying to harm/follow him but unsure of a specific person. Pt does not agree to an additional medication adjustemnt for paranoia, will discuss with pt again. No side effects or complaints of current medication. No overt delusions elicited. He denies SI/HI, intent and plan. However, spouse reports patient threatening to endorse suicide.     Mental Status Exam:  Level of Consciousness: Alert  General Appearance: No deformities present  Body Habitus: Average build  Hygiene: Fair  Grooming: Fair  Behavior: Cooperative  Eye Contact: Poor  Impulse Control: Unable to assess  Muscle Tone/Strength: Unable to assess  Abnormal Movements: No abnormal movements  Gait/Station: Unable to assess  Speech: Normal  Speech: Soft volume, Slowed rate  Mood: Normal  Affect Quality: Depressed  Affect Range: Blunted  Affect Congruence: Congruent  Thought Process: Unable to assess  Thought Associations: Normal  Thought Content: Normal   Perceptions: No abnormalities   Orientation: Oriented to time, place, person  Attention/Concentration: Normal  Estimated Intelligence: Average   Recent Memory: Normal  Remote Memory: Normal   Fund of Knowledge: Normal   Language: No abnormalities noted  Judgment: Fair  Insight: Fair    Assessment Summary: 58 y/o man, resides with his wife and 2 adult sons, retired supervisor at [employer], with a PMH of HTN and recent chronic lacunar infarct with no residual deficits diagnosed in July 2024, and PPH of recent dx of depression who was admitted to the medical floor for the evaluation of AMS with negative medical workup, and transferred to [facility] for further management of SI and threatening statements made to his wife I/s/o ongoing marital conflicts.   Patient had admitted to infidelity to his wife resulting in marital stressors, was diagnosed with depression on 7/15 and placed on Lexapro but had not been taking it.     On evaluation patient was seen and initially considered not to be a danger to himself or others with the plan to follow up with outpatient psychiatric and psychotherapy services, however the  psychiatry team was recalled because patient was said to be making threatening statements to his wife and again endorsing SI. His worsening mood and anxiety appears more likely in the context of fears of his wife leaving him. Pt will likely benefit from IPP at this time.    #Depression  #Anxiety  -c/w lexapro 10 mg daily  -encourage groups/DBT    #Agitation  -for agitation not amenable to verbal redirection, may give haldol 3 mg q6h prn and/or ativan 1 mg q6h prn prn with escalation to IM if pt is refusing PO and is an acute danger to self or/and others with repeat EKG to ensure QTc <500 ms   Interval Chief Complaint: " Im feeling okay"     Interval History: Pt seen and evaluated, chart reviewed. As per nursing report, no acute events overnight, no PRNs. On evaluation, pt presents pleasant, calm and cooperative. He endorses some anxiety and paranoia. He reports an okay mood, sleep, and appetite. He denies AH or VH. He reports others are trying to harm/follow him but unsure of who. Pt does not agree to an additional medication adjustment for paranoia, will discuss with pt again. No side effects or complaints of current medication. No overt delusions elicited. He denies SI/HI, intent and plan. However, spouse reports patient threatening to endorse suicide.     Mental Status Exam:  Level of Consciousness: Alert  General Appearance: No deformities present  Body Habitus: Average build  Hygiene: Fair  Grooming: Fair  Behavior: Cooperative  Eye Contact: Poor  Impulse Control: Unable to assess  Muscle Tone/Strength: Unable to assess  Abnormal Movements: No abnormal movements  Gait/Station: Unable to assess  Speech: Normal  Speech: Soft volume, Slowed rate  Mood: Normal  Affect Quality: Depressed  Affect Range: Blunted  Affect Congruence: Congruent  Thought Process: Unable to assess  Thought Associations: Normal  Thought Content: Normal   Perceptions: No abnormalities   Orientation: Oriented to time, place, person  Attention/Concentration: Normal  Estimated Intelligence: Average   Recent Memory: Normal  Remote Memory: Normal   Fund of Knowledge: Normal   Language: No abnormalities noted  Judgment: Fair  Insight: Fair    Assessment Summary: 58 y/o man, resides with his wife and 2 adult sons, retired supervisor at [employer], with a PMH of HTN and recent chronic lacunar infarct with no residual deficits diagnosed in July 2024, and PPH of recent dx of depression who was admitted to the medical floor for the evaluation of AMS with negative medical workup, and transferred to [facility] for further management of SI and threatening statements made to his wife I/s/o ongoing marital conflicts.   Patient had admitted to infidelity to his wife resulting in marital stressors, was diagnosed with depression on 7/15 and placed on Lexapro but had not been taking it.     On evaluation patient was seen and initially considered not to be a danger to himself or others with the plan to follow up with outpatient psychiatric and psychotherapy services, however the  psychiatry team was recalled because patient was said to be making threatening statements to his wife and again endorsing SI. His worsening mood and anxiety appears more likely in the context of fears of his wife leaving him. Pt will likely benefit from IPP at this time.    #Depression  #Anxiety  -c/w lexapro 10 mg daily  -encourage groups/DBT    #Agitation  -for agitation not amenable to verbal redirection, may give haldol 3 mg q6h prn and/or ativan 1 mg q6h prn prn with escalation to IM if pt is refusing PO and is an acute danger to self or/and others with repeat EKG to ensure QTc <500 ms   Interval Chief Complaint: " Im feeling okay"     Interval History: Pt seen and evaluated, chart reviewed. As per nursing report, no acute events overnight, no PRNs. On evaluation, pt presents cooperative, he endorses some anxiety and paranoia. He reports an okay mood, sleep, and appetite. He denies AH or VH. He reports others are trying to harm/follow him but unsure of who. No side effects or complaints of current medication. No overt delusions elicited. He denies SI/HI, intent and plan.     Mental Status Exam:  Level of Consciousness: Alert  General Appearance: No deformities present  Body Habitus: Average build  Hygiene: Fair  Grooming: Fair  Behavior: Cooperative  Eye Contact: Poor  Impulse Control: Unable to assess  Muscle Tone/Strength: Unable to assess  Abnormal Movements: No abnormal movements  Gait/Station: Unable to assess  Speech: Normal  Speech: Soft volume, Slowed rate  Mood: Normal  Affect Quality: Depressed  Affect Range: Blunted  Affect Congruence: Congruent  Thought Process: Unable to assess  Thought Associations: Normal  Thought Content: Normal   Perceptions: No abnormalities   Orientation: Oriented to time, place, person  Attention/Concentration: Normal  Estimated Intelligence: Average   Recent Memory: Normal  Remote Memory: Normal   Fund of Knowledge: Normal   Language: No abnormalities noted  Judgment: Fair  Insight: Fair    Assessment Summary: 60 y/o man, resides with his wife and 2 adult sons, retired supervisor at [employer], with a PMH of HTN and recent chronic lacunar infarct with no residual deficits diagnosed in July 2024, and PPH of recent dx of depression who was admitted to the medical floor for the evaluation of AMS with negative medical workup, and transferred to [facility] for further management of SI and threatening statements made to his wife I/s/o ongoing marital conflicts.   Patient had admitted to infidelity to his wife resulting in marital stressors, was diagnosed with depression on 7/15 and placed on Lexapro but had not been taking it.     On evaluation patient was seen and initially considered not to be a danger to himself or others with the plan to follow up with outpatient psychiatric and psychotherapy services, however the  psychiatry team was recalled because patient was said to be making threatening statements to his wife and again endorsing SI. His worsening mood and anxiety appears more likely in the context of fears of his wife leaving him. Pt will likely benefit from IPP at this time.    #Depression  #Anxiety  -c/w lexapro 10 mg daily  -encourage groups/DBT    #Agitation  -for agitation not amenable to verbal redirection, may give haldol 3 mg q6h prn and/or ativan 1 mg q6h prn prn with escalation to IM if pt is refusing PO and is an acute danger to self or/and others with repeat EKG to ensure QTc <500 ms   Interval Chief Complaint: " Im feeling okay"     Interval History: Pt seen and evaluated, chart reviewed. As per nursing report, no acute events overnight, no PRNs. On evaluation, pt presents cooperative, he endorses some anxiety and paranoia. He reports an okay mood, sleep, and appetite. He denies AH or VH. He reports others are trying to harm/follow him but unsure of who. No side effects or complaints of current medication. No overt delusions elicited. He denies SI/HI, intent and plan.     Mental Status Exam:  Level of Consciousness: Alert  General Appearance: No deformities present  Body Habitus: Average build  Hygiene: Fair  Grooming: Fair  Behavior: Cooperative  Eye Contact: Poor  Impulse Control: Unable to assess  Muscle Tone/Strength: Unable to assess  Abnormal Movements: No abnormal movements  Gait/Station: Unable to assess  Speech: Normal  Speech: Soft volume, Slowed rate  Mood: Normal  Affect Quality: Depressed  Affect Range: Blunted  Affect Congruence: Congruent  Thought Process: Unable to assess  Thought Associations: Normal  Thought Content: Normal   Perceptions: No abnormalities   Orientation: Oriented to time, place, person  Attention/Concentration: Normal  Estimated Intelligence: Average   Recent Memory: Normal  Remote Memory: Normal   Fund of Knowledge: Normal   Language: No abnormalities noted  Judgment: Fair  Insight: Fair

## 2024-08-05 NOTE — BH INPATIENT PSYCHIATRY PROGRESS NOTE - NSBHFUPINTERVALHXFT_PSY_A_CORE
Pt seen and evaluated, chart reviewed. As per nursing report, no acute events overnight, no PRNs. On evaluation, pt presents sitting in bed with arms folded on interview, appears hunched over, furrowed brow, at times fidgeting. He gives brief answers to interview questions. He reports he is not doing well, admits to anxious ruminations and low mood regarding current circumstances. He states he does not feel safe and  Pt seen and evaluated, chart reviewed. As per nursing report, no acute events overnight, no PRNs. On evaluation, pt presents sitting in bed with arms folded on interview, appears hunched over, furrowed brow, at times fidgeting. He gives brief answers to interview questions. He reports he is not doing well, admits to anxious ruminations and low mood regarding current circumstances. He states he does not feel safe and feels that he is in danger for "what I've done", which with prompting he states is d/t "the role play" "the games". He states he does not know who/what he is in danger from. Pt just shakes his head in attempts to further clarify. He denies AH or VH. He reports he is with overall improving appetite and sleep. He denies SI/HI, intent and plan. He denies negative side effects of medications. Has been isolative to room, encouraged groups/DBT. Pt seen and evaluated, chart reviewed. As per nursing report, no acute events overnight, no PRNs. On evaluation, pt presents sitting in bed with arms folded on interview, appears hunched over, furrowed brow, at times fidgeting. He gives brief answers to interview questions. He reports he is not doing well, admits to anxious ruminations and low mood regarding current circumstances, but then is unable to elaborate further despite encouragement. He also states he does not feel safe and that he is in danger for "what I've done", which with prompting he states is d/t "the role play" "the games". He states he does not know who/what he is in danger from. Pt just shakes his head in attempts to further clarify. He admits to difficulty in completing thoughts and concentrating. He denies AH or VH. He reports fair appetite despite staff report of poor PO intake. He reports disrupted sleep and feeling tired. He denies SI/HI, intent and plan. He denies negative side effects of medications. Has been isolative to room, encouraged groups/DBT.

## 2024-08-05 NOTE — BH INPATIENT PSYCHIATRY PROGRESS NOTE - NSBHADMITMEDEDUDETAILS_A_CORE FT
Discussed indication and RABs of antipsychotics (eg risperdal); pt agrees to consider, declines at this time. Discussed indication and RABs of antipsychotics (eg zyprexa), pt verbalizes understanding.
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
## 2024-08-05 NOTE — BH INPATIENT PSYCHIATRY PROGRESS NOTE - NSBHASSESSSUMMFT_PSY_ALL_CORE
60 y/o man, resides with his wife and 2 adult sons, retired supervisor at [employer], with a PMH of HTN and recent chronic lacunar infarct with no residual deficits diagnosed in July 2024, and PPH of recent dx of depression who was admitted to the medical floor for the evaluation of AMS with negative medical workup, and transferred to [facility] for further management of SI and threatening statements made to his wife I/s/o ongoing marital conflicts. Patient had admitted to infidelity to his wife resulting in marital stressors, was diagnosed with depression on 7/15 and placed on Lexapro but had not been taking it. Patient was seen and initially considered not to be a danger to himself or others with the plan to follow up with outpatient psychiatric and psychotherapy services, however the  psychiatry team was recalled because patient was said to be making threatening statements to his wife and again endorsing SI. His worsening mood and anxiety appears more likely in the context of fears of his wife leaving him. Pt will likely benefit from [facility] at this time.    #Depression, r/o psychotic fx  #Anxiety  -titrate lexapro 15 mg daily  -encourage groups/DBT    #Agitation  -for agitation not amenable to verbal redirection, may give haldol 3 mg q6h prn and/or ativan 1 mg q6h prn prn with escalation to IM if pt is refusing PO and is an acute danger to self or/and others with repeat EKG to ensure QTc <500 ms 58 y/o man, resides with his wife and 2 adult sons, retired supervisor at [employer], with a PMH of HTN and recent chronic lacunar infarct with no residual deficits diagnosed in July 2024, and PPH of recent dx of depression who was admitted to the medical floor for the evaluation of AMS with negative medical workup, and transferred to [facility] for further management of SI and threatening statements made to his wife I/s/o ongoing marital conflicts. Patient had admitted to infidelity to his wife resulting in marital stressors, was diagnosed with depression on 7/15 and placed on Lexapro but had not been taking it. Patient was seen and initially considered not to be a danger to himself or others with the plan to follow up with outpatient psychiatric and psychotherapy services, however the  psychiatry team was recalled because patient was said to be making threatening statements to his wife and again endorsing SI. His worsening mood and anxiety appears more likely in the context of fears of his wife leaving him.     On evaluation, pt presents with constricted affect, reports minimal improvement in his mood and today endorses paranoia that he is in danger. Despite attempts in clarification, pt only able to state worry that "others" would want to hurt him for the "role playing" "games". He denies SI/HI. He has been adherent to medications, isolative to room and not a behavioral concern.    #Depression, r/o psychotic fx  #Anxiety  -titrate lexapro 15 mg daily  -to consider start of risperdal 0.5 mg qhs  -encourage groups/DBT    #Agitation  -for agitation not amenable to verbal redirection, may give haldol 3 mg q6h prn and/or ativan 1 mg q6h prn prn with escalation to IM if pt is refusing PO and is an acute danger to self or/and others with repeat EKG to ensure QTc <500 ms 58 y/o man, resides with his wife and 2 adult sons, retired supervisor at [employer], with a PMH of HTN and recent chronic lacunar infarct with no residual deficits diagnosed in July 2024, and PPH of recent dx of depression who was admitted to the medical floor for the evaluation of AMS with negative medical workup, and transferred to [facility] for further management of SI and threatening statements made to his wife I/s/o ongoing marital conflicts. Patient had admitted to infidelity to his wife resulting in marital stressors, was diagnosed with depression on 7/15 and placed on Lexapro but had not been taking it. Patient was seen and initially considered not to be a danger to himself or others with the plan to follow up with outpatient psychiatric and psychotherapy services, however the  psychiatry team was recalled because patient was said to be making threatening statements to his wife and again endorsing SI. His worsening mood and anxiety appears more likely in the context of fears of his wife leaving him.     On evaluation, pt presents with constricted affect and possible thought blocking, reports minimal improvement in his mood and today endorses paranoia that he is in danger. Despite attempts in clarification, pt only able to state worry that "others" would want to hurt him for the "role playing" "games". He is noted with minimal PO intake and disrupted sleep. He denies SI/HI. He has been adherent to medications, isolative to room and not a behavioral concern.    #Depression, r/o psychotic fx  #Anxiety  -c/w lexapro 10 mg daily  -start zyprexa 2.5 mg qhs  -encourage groups/DBT    #Agitation  -for agitation not amenable to verbal redirection, may give haldol 3 mg q6h prn and/or ativan 1 mg q6h prn prn with escalation to IM if pt is refusing PO and is an acute danger to self or/and others with repeat EKG to ensure QTc <500 ms 60 y/o man, resides with his wife and 2 adult sons, retired supervisor at [employer], with a PMH of HTN and recent chronic lacunar infarct with no residual deficits diagnosed in July 2024, and PPH of recent dx of depression who was admitted to the medical floor for the evaluation of AMS with negative medical workup, and transferred to [facility] for further management of SI and threatening statements made to his wife I/s/o ongoing marital conflicts. Patient had admitted to infidelity to his wife resulting in marital stressors, was diagnosed with depression on 7/15 and placed on Lexapro but had not been taking it. Patient was seen and initially considered not to be a danger to himself or others with the plan to follow up with outpatient psychiatric and psychotherapy services, however the  psychiatry team was recalled because patient was said to be making threatening statements to his wife and again endorsing SI. His worsening mood and anxiety appears more likely in the context of fears of his wife leaving him.     On evaluation, pt presents with constricted affect and likely thought blocking, reports minimal improvement in his mood and today endorses paranoia that he is in danger. Despite attempts in clarification, pt only able to state worry that "others" would want to hurt him for the "role playing" "games". He is noted with poor PO intake and disrupted sleep. He denies SI/HI. He has been adherent to medications and isolative to room.    #MDD with psychotic fx  #Anxiety  -c/w lexapro 10 mg daily  -start zyprexa 2.5 mg qhs  -encourage groups/DBT    #Agitation  -for agitation not amenable to verbal redirection, may give haldol 3 mg q6h prn and/or ativan 1 mg q6h prn prn with escalation to IM if pt is refusing PO and is an acute danger to self or/and others with repeat EKG to ensure QTc <500 ms

## 2024-08-05 NOTE — BH INPATIENT PSYCHIATRY PROGRESS NOTE - PRN MEDS
MEDICATIONS  (PRN):  haloperidol     Tablet 3 milliGRAM(s) Oral every 8 hours PRN agitation  hydrOXYzine hydrochloride 25 milliGRAM(s) Oral every 6 hours PRN anxiety/insomnia  LORazepam     Tablet 1 milliGRAM(s) Oral every 8 hours PRN physical aggression   MEDICATIONS  (PRN):  hydrOXYzine hydrochloride 25 milliGRAM(s) Oral every 6 hours PRN anxiety/insomnia  LORazepam     Tablet 1 milliGRAM(s) Oral every 8 hours PRN physical aggression  OLANZapine 2.5 milliGRAM(s) Oral every 6 hours PRN agitation

## 2024-08-06 LAB
ALBUMIN SERPL ELPH-MCNC: 4.5 G/DL — SIGNIFICANT CHANGE UP (ref 3.5–5.2)
ALP SERPL-CCNC: 79 U/L — SIGNIFICANT CHANGE UP (ref 30–115)
ALT FLD-CCNC: 97 U/L — HIGH (ref 0–41)
ANION GAP SERPL CALC-SCNC: 11 MMOL/L — SIGNIFICANT CHANGE UP (ref 7–14)
AST SERPL-CCNC: 41 U/L — SIGNIFICANT CHANGE UP (ref 0–41)
BILIRUB SERPL-MCNC: 0.5 MG/DL — SIGNIFICANT CHANGE UP (ref 0.2–1.2)
BUN SERPL-MCNC: 15 MG/DL — SIGNIFICANT CHANGE UP (ref 10–20)
CALCIUM SERPL-MCNC: 10.1 MG/DL — SIGNIFICANT CHANGE UP (ref 8.4–10.5)
CHLORIDE SERPL-SCNC: 99 MMOL/L — SIGNIFICANT CHANGE UP (ref 98–110)
CO2 SERPL-SCNC: 31 MMOL/L — SIGNIFICANT CHANGE UP (ref 17–32)
CREAT SERPL-MCNC: 1.4 MG/DL — SIGNIFICANT CHANGE UP (ref 0.7–1.5)
EGFR: 58 ML/MIN/1.73M2 — LOW
GLUCOSE SERPL-MCNC: 117 MG/DL — HIGH (ref 70–99)
POTASSIUM SERPL-MCNC: 4.8 MMOL/L — SIGNIFICANT CHANGE UP (ref 3.5–5)
POTASSIUM SERPL-SCNC: 4.8 MMOL/L — SIGNIFICANT CHANGE UP (ref 3.5–5)
PROT SERPL-MCNC: 7.2 G/DL — SIGNIFICANT CHANGE UP (ref 6–8)
SODIUM SERPL-SCNC: 141 MMOL/L — SIGNIFICANT CHANGE UP (ref 135–146)

## 2024-08-06 PROCEDURE — 99232 SBSQ HOSP IP/OBS MODERATE 35: CPT

## 2024-08-06 RX ORDER — OLANZAPINE 7.5 MG/1
5 TABLET ORAL AT BEDTIME
Refills: 0 | Status: DISCONTINUED | OUTPATIENT
Start: 2024-08-06 | End: 2024-08-12

## 2024-08-06 RX ORDER — SENNA 187 MG
2 TABLET ORAL DAILY
Refills: 0 | Status: DISCONTINUED | OUTPATIENT
Start: 2024-08-06 | End: 2024-08-21

## 2024-08-06 RX ADMIN — Medication 10 MILLIGRAM(S): at 19:53

## 2024-08-06 RX ADMIN — AMLODIPINE BESYLATE 5 MILLIGRAM(S): 10 TABLET ORAL at 08:47

## 2024-08-06 RX ADMIN — ESCITALOPRAM OXALATE 10 MILLIGRAM(S): 10 TABLET ORAL at 08:47

## 2024-08-06 RX ADMIN — Medication 81 MILLIGRAM(S): at 08:47

## 2024-08-06 RX ADMIN — OLANZAPINE 5 MILLIGRAM(S): 7.5 TABLET ORAL at 19:53

## 2024-08-06 NOTE — BH CHART NOTE - NSPSYPRGNOTEFT_PSY_ALL_CORE
Interval Chief Complaint: "Im okay"  Interval History: Pt seen and evaluated, chart reviewed. As per nursing report, no acute events overnight, no PRNs. On evaluation, pt presents sitting in bed and cooperative. He gives brief answers to interview questions and unable to elaborate on his answers. He admits he is anxious and has low mood. He also states he is in danger but unsure of who he is in danger from and does not feel safe. Patient has difficulty in completing thoughts and concerns. He denies AH or VH. He reports fair appetite and sleep. He denies SI/HI, intent and plan. He denies negative side effects of medications. Patient has been isolative to room but encouraged to participate in groups.     Mental Status Exam:  Level of Consciousness: Alert  General Appearance: No deformities present  Body Habitus: Average build  Hygiene: Fair  Grooming: Fair  Behavior: Cooperative  Eye Contact: Poor  Relatedness: Poor  Impulse Control: Normal  Muscle Tone/Strength: Unable to assess  Abnormal Movements: No abnormal movements  Gait/Station: Unable to assess  Speech: Normal volume, rate, productivity  Mood: Depressed, Anxious  Affect Quality: Depressed, Anxious  Affect Range: Constricted  Affect Congruence: Congruent  Thought Process: Perseverative  Thought Associations: Normal  Thought Content: Delusions, Preoccupations, Hopelessness  Perceptions: No abnormalities  Orientation: Oriented to time, place, person, situation  Attention/Concentration: Normal  Estimated Intelligence: Average  Recent Memory: Impaired  Remote Memory: Impaired  Fund of Knowledge: Normal  How Fund of Knowledge Assessed: Vocabulary  Language: No abnormalities noted  Judgment: Poor   Insight: Poor    Assessment Summary: 58 y/o man, resides with his wife and 2 adult sons, retired supervisor at [employer], with a PMH of HTN and recent chronic lacunar infarct with no residual deficits diagnosed in July 2024, and PPH of recent dx of depression who was admitted to the medical floor for the evaluation of AMS with negative medical workup, and transferred to [facility] for further management of SI and threatening statements made to his wife I/s/o ongoing marital conflicts. Patient had admitted to infidelity to his wife resulting in marital stressors, was diagnosed with depression on 7/15 and placed on Lexapro but had not been taking it. Patient was seen and initially considered not to be a danger to himself or others with the plan to follow up with outpatient psychiatric and psychotherapy services, however the  psychiatry team was recalled because patient was said to be making threatening statements to his wife and again endorsing SI. His worsening mood and anxiety appears more likely in the context of fears of his wife leaving him.     On evaluation, pt presents with constricted affect and likely thought blocking, with minimal improvement in his mood and today endorses paranoia that he is in danger.  He admits he is anxious and has low mood. He also states he is in danger but unsure of who he is in danger from and does not feel safe. He is noted with poor PO intake and sleep. He denies SI/HI. He has been adherent to medications and isolative to room but encouraged to join groups.    #MDD with psychotic fx  #Anxiety  -c/w Lexapro 10 mg daily  -start Zyprexa 2.5 mg qhs  -encourage to particpate in groups    #Agitation  -for agitation not amenable to verbal redirection, may give haldol 3 mg q6h prn and/or ativan 1 mg q6h prn prn with escalation to IM if pt is refusing PO and is an acute danger to self or/and others with repeat EKG to ensure QTc <500 ms   Interval Chief Complaint: "Im okay"  Interval History: Pt seen and evaluated, chart reviewed. As per nursing report, no acute events overnight, no PRNs. On evaluation, pt presents sitting in bed and cooperative. He gives brief answers to interview questions and unable to elaborate on his answers. He is anxious and has low mood. He also states he is in danger but unsure of who he is in danger from and does not feel safe. Patient has difficulty in completing thoughts and concerns. He denies AH or VH. He reports poor appetite and sleep. He denies SI/HI, intent and plan. He denies negative side effects of medications. Patient has been isolative to room but encouraged to participate in groups.     Mental Status Exam:  Level of Consciousness: Alert  General Appearance: No deformities present  Body Habitus: Average build  Hygiene: Fair  Grooming: Fair  Behavior: Cooperative  Eye Contact: Poor  Relatedness: Poor  Impulse Control: Normal  Muscle Tone/Strength: Unable to assess  Abnormal Movements: No abnormal movements  Gait/Station: Unable to assess  Speech: Normal volume, rate, productivity  Mood: Depressed, Anxious  Affect Quality: Depressed, Anxious  Affect Range: Constricted  Affect Congruence: Congruent  Thought Process: Perseverative  Thought Associations: Normal  Thought Content: Delusions, Preoccupations, Hopelessness  Perceptions: No abnormalities  Orientation: Oriented to time, place, person, situation  Attention/Concentration: Normal  Estimated Intelligence: Average  Recent Memory: Impaired  Remote Memory: Impaired  Fund of Knowledge: Normal  How Fund of Knowledge Assessed: Vocabulary  Language: No abnormalities noted  Judgment: Constricted   Insight: Constricted    Assessment Summary: 58 y/o man, resides with his wife and 2 adult sons, retired supervisor at [employer], with a PMH of HTN and recent chronic lacunar infarct with no residual deficits diagnosed in July 2024, and PPH of recent dx of depression who was admitted to the medical floor for the evaluation of AMS with negative medical workup, and transferred to [facility] for further management of SI and threatening statements made to his wife I/s/o ongoing marital conflicts. Patient had admitted to infidelity to his wife resulting in marital stressors, was diagnosed with depression on 7/15 and placed on Lexapro but had not been taking it. Patient was seen and initially considered not to be a danger to himself or others with the plan to follow up with outpatient psychiatric and psychotherapy services, however the  psychiatry team was recalled because patient was said to be making threatening statements to his wife and again endorsing SI. His worsening mood and anxiety appears more likely in the context of fears of his wife leaving him.     On evaluation, pt presents with constricted affect and likely thought blocking, with minimal improvement in his mood. He also states he is in danger but unsure of who he is in danger from and does not feel safe. He is anxious and has low mood. He is noted with poor PO intake and sleep. He denies SI/HI. He has been adherent to medications and isolative to room but encouraged to join groups.    #MDD with psychotic fx  #Anxiety  -c/w Lexapro 10 mg daily  -start Zyprexa 2.5 mg qhs  -encourage to particpate in groups    #Agitation  -for agitation not amenable to verbal redirection, may give haldol 3 mg q6h prn and/or ativan 1 mg q6h prn prn with escalation to IM if pt is refusing PO and is an acute danger to self or/and others with repeat EKG to ensure QTc <500 ms   Interval Chief Complaint: "Im okay"  Interval History: Pt seen and evaluated, chart reviewed. As per nursing report, no acute events overnight, no PRNs. On evaluation, pt presents sitting in bed, making eye contact, and cooperative. Patient responses have been more fluent. He has some, low mood, paranoia. He also states he is in danger but unsure of who is targeting him and does not feel safe. Patient is also worried for his family and says their "unsafe" but could not elaborate on why. He denies AH or VH. He reports poor appetite and but improving sleep. He denies SI/HI, intent and plan. He denies negative side effects of medications. Patient has been isolative to room but encouraged to participate in groups.     Mental Status Exam:  Level of Consciousness: Alert  General Appearance: No deformities present  Body Habitus: Average build  Hygiene: Fair  Grooming: Fair  Behavior: Cooperative  Eye Contact: Poor  Relatedness: Poor  Impulse Control: Normal  Muscle Tone/Strength: Unable to assess  Abnormal Movements: No abnormal movements  Gait/Station: Unable to assess  Speech: Normal volume, rate, productivity  Mood: Depressed, Anxious  Affect Quality: Depressed, Anxious  Affect Range: Constricted  Affect Congruence: Congruent  Thought Process: Perseverative  Thought Associations: Normal  Thought Content: Delusions, Preoccupations, Hopelessness  Perceptions: No abnormalities  Orientation: Oriented to time, place, person, situation  Attention/Concentration: Normal  Estimated Intelligence: Average  Recent Memory: Impaired  Remote Memory: Impaired  Fund of Knowledge: Normal  How Fund of Knowledge Assessed: Vocabulary  Language: No abnormalities noted  Judgment: Fair  Insight: Fair    Assessment Summary: 58 y/o man, resides with his wife and 2 adult sons, retired supervisor at [employer], with a PMH of HTN and recent chronic lacunar infarct with no residual deficits diagnosed in July 2024, and PPH of recent dx of depression who was admitted to the medical floor for the evaluation of AMS with negative medical workup, and transferred to [facility] for further management of SI and threatening statements made to his wife I/s/o ongoing marital conflicts. Patient had admitted to infidelity to his wife resulting in marital stressors, was diagnosed with depression on 7/15 and placed on Lexapro but had not been taking it. Patient was seen and initially considered not to be a danger to himself or others with the plan to follow up with outpatient psychiatric and psychotherapy services, however the  psychiatry team was recalled because patient was said to be making threatening statements to his wife and again endorsing SI. His worsening mood and anxiety appears more likely in the context of fears of his wife leaving him.     On evaluation, pt presents with anxiety but more fluent speech. He states he is in danger but unsure of who he is targeting him and does not feel safe. He has some anxiety, low mood, paranoia. He is noted with poor appetite but improving sleep. He denies SI/HI. He has been adherent to medications and isolative to room but encouraged to join groups.    #MDD with psychotic fx  #Anxiety  -c/w Lexapro 10 mg daily  -start Zyprexa 2.5 mg qhs  -encourage to particpate in groups    #Agitation  -for agitation not amenable to verbal redirection, may give haldol 3 mg q6h prn and/or ativan 1 mg q6h prn prn with escalation to IM if pt is refusing PO and is an acute danger to self or/and others with repeat EKG to ensure QTc <500 ms   Interval Chief Complaint: "Im okay"  Interval History: Pt seen and evaluated, chart reviewed. As per nursing report, no acute events overnight, no PRNs. On evaluation, pt presents sitting in bed, making eye contact, and cooperative. Patient responses have been more fluent. He has some low mood, paranoia. He also states he is in danger but unsure of who is targeting him and does not feel safe. Patient is also worried for his family and says their "unsafe" but could not elaborate on why. He denies AH or VH. He reports poor appetite and but improving sleep. He denies SI/HI, intent and plan. He denies negative side effects of medications. Patient has been isolative to room but encouraged to participate in groups.     Mental Status Exam:  Level of Consciousness: Alert  General Appearance: No deformities present  Body Habitus: Average build  Hygiene: Fair  Grooming: Fair  Behavior: Cooperative  Eye Contact: Poor  Relatedness: Poor  Impulse Control: Normal  Muscle Tone/Strength: Unable to assess  Abnormal Movements: No abnormal movements  Gait/Station: Unable to assess  Speech: Normal volume, rate, productivity  Mood: Depressed, Anxious  Affect Quality: Depressed, Anxious  Affect Range: Constricted  Affect Congruence: Congruent  Thought Process: Perseverative  Thought Associations: Normal  Thought Content: Delusions, Preoccupations, Hopelessness  Perceptions: No abnormalities  Orientation: Oriented to time, place, person, situation  Attention/Concentration: Normal  Estimated Intelligence: Average  Recent Memory: Impaired  Remote Memory: Impaired  Fund of Knowledge: Normal  How Fund of Knowledge Assessed: Vocabulary  Language: No abnormalities noted  Judgment: Fair  Insight: Fair    Assessment Summary: 60 y/o man, resides with his wife and 2 adult sons, retired supervisor at [employer], with a PMH of HTN and recent chronic lacunar infarct with no residual deficits diagnosed in July 2024, and PPH of recent dx of depression who was admitted to the medical floor for the evaluation of AMS with negative medical workup, and transferred to [facility] for further management of SI and threatening statements made to his wife I/s/o ongoing marital conflicts. Patient had admitted to infidelity to his wife resulting in marital stressors, was diagnosed with depression on 7/15 and placed on Lexapro but had not been taking it. Patient was seen and initially considered not to be a danger to himself or others with the plan to follow up with outpatient psychiatric and psychotherapy services, however the  psychiatry team was recalled because patient was said to be making threatening statements to his wife and again endorsing SI. His worsening mood and anxiety appears more likely in the context of fears of his wife leaving him.     On evaluation, pt presents with anxiety but more fluent speech. He states he is in danger but unsure of who he is targeting him and does not feel safe. He has some anxiety, low mood, paranoia. He is noted with poor appetite but improving sleep. He denies SI/HI. He has been adherent to medications and isolative to room but encouraged to join groups.    #MDD with psychotic fx  #Anxiety  -c/w Lexapro 10 mg daily  -start Zyprexa 2.5 mg qhs  -encourage to particpate in groups    #Agitation  -for agitation not amenable to verbal redirection, may give haldol 3 mg q6h prn and/or ativan 1 mg q6h prn prn with escalation to IM if pt is refusing PO and is an acute danger to self or/and others with repeat EKG to ensure QTc <500 ms   Interval Chief Complaint: "Im okay"  Interval History: Pt seen and evaluated, chart reviewed. As per nursing report, no acute events overnight, no PRNs. On evaluation, pt presents sitting in bed, making eye contact, and cooperative. Patients speech has been more fluent. He has some low mood, paranoia. He also states he is in danger but unsure of who is targeting him and does not feel safe. Patient is also worried for his family and says their "unsafe" but could not elaborate on why. He denies AH or VH. He reports poor appetite and but improving sleep. He denies SI/HI, intent and plan. He denies negative side effects of medications. Patient has been isolative to room but encouraged to participate in groups.     Mental Status Exam:  Level of Consciousness: Alert  General Appearance: No deformities present  Body Habitus: Average build  Hygiene: Fair  Grooming: Fair  Behavior: Cooperative  Eye Contact: Poor  Relatedness: Poor  Impulse Control: Normal  Muscle Tone/Strength: Unable to assess  Abnormal Movements: No abnormal movements  Gait/Station: Unable to assess  Speech: Normal volume, rate, productivity  Mood: Depressed, Anxious  Affect Quality: Depressed, Anxious  Affect Range: Constricted  Affect Congruence: Congruent  Thought Process: Perseverative  Thought Associations: Normal  Thought Content: Delusions, Preoccupations, Hopelessness  Perceptions: No abnormalities  Orientation: Oriented to time, place, person, situation  Attention/Concentration: Normal  Estimated Intelligence: Average  Recent Memory: Impaired  Remote Memory: Impaired  Fund of Knowledge: Normal  How Fund of Knowledge Assessed: Vocabulary  Language: No abnormalities noted  Judgment: Fair  Insight: Fair    Assessment Summary: 60 y/o man, resides with his wife and 2 adult sons, retired supervisor at [employer], with a PMH of HTN and recent chronic lacunar infarct with no residual deficits diagnosed in July 2024, and PPH of recent dx of depression who was admitted to the medical floor for the evaluation of AMS with negative medical workup, and transferred to [facility] for further management of SI and threatening statements made to his wife I/s/o ongoing marital conflicts. Patient had admitted to infidelity to his wife resulting in marital stressors, was diagnosed with depression on 7/15 and placed on Lexapro but had not been taking it. Patient was seen and initially considered not to be a danger to himself or others with the plan to follow up with outpatient psychiatric and psychotherapy services, however the  psychiatry team was recalled because patient was said to be making threatening statements to his wife and again endorsing SI. His worsening mood and anxiety appears more likely in the context of fears of his wife leaving him.     On evaluation, pt presents with anxiety but more fluent speech. He states he is in danger but unsure of who he is targeting him and does not feel safe. He has some anxiety, low mood, paranoia. He is noted with poor appetite but improving sleep. He denies SI/HI. He has been adherent to medications and isolative to room but encouraged to join groups.    #MDD with psychotic fx  #Anxiety  -c/w Lexapro 10 mg daily  -start Zyprexa 2.5 mg qhs  -encourage to particpate in groups    #Agitation  -for agitation not amenable to verbal redirection, may give haldol 3 mg q6h prn and/or ativan 1 mg q6h prn prn with escalation to IM if pt is refusing PO and is an acute danger to self or/and others with repeat EKG to ensure QTc <500 ms   Interval Chief Complaint: "Im okay"  Interval History: Pt seen and evaluated, chart reviewed. As per nursing report, no acute events overnight, no PRNs. On evaluation, pt presents sitting in bed, making eye contact, and cooperative. Patients speech has been more fluent. He has some anxiety and paranoia. He also states he is in danger but unsure of who is targeting him and does not feel safe. Patient is also worried for his family and says their "unsafe" but could not elaborate on why. He denies AH or VH. He reports poor appetite and but improving sleep. He denies SI/HI, intent and plan. He denies negative side effects of medications. Patient has been isolative to room but encouraged to participate in groups.     Mental Status Exam:  Level of Consciousness: Alert  General Appearance: No deformities present  Body Habitus: Average build  Hygiene: Fair  Grooming: Fair  Behavior: Cooperative  Eye Contact: Poor  Relatedness: Poor  Impulse Control: Normal  Muscle Tone/Strength: Unable to assess  Abnormal Movements: No abnormal movements  Gait/Station: Unable to assess  Speech: Normal volume, rate, productivity  Mood: Depressed, Anxious  Affect Quality: Depressed, Anxious  Affect Range: Constricted  Affect Congruence: Congruent  Thought Process: Perseverative  Thought Associations: Normal  Thought Content: Delusions, Preoccupations, Hopelessness  Perceptions: No abnormalities  Orientation: Oriented to time, place, person, situation  Attention/Concentration: Normal  Estimated Intelligence: Average  Recent Memory: Impaired  Remote Memory: Impaired  Fund of Knowledge: Normal  How Fund of Knowledge Assessed: Vocabulary  Language: No abnormalities noted  Judgment: Fair  Insight: Fair    Assessment Summary: 60 y/o man, resides with his wife and 2 adult sons, retired supervisor at [employer], with a PMH of HTN and recent chronic lacunar infarct with no residual deficits diagnosed in July 2024, and PPH of recent dx of depression who was admitted to the medical floor for the evaluation of AMS with negative medical workup, and transferred to [facility] for further management of SI and threatening statements made to his wife I/s/o ongoing marital conflicts. Patient had admitted to infidelity to his wife resulting in marital stressors, was diagnosed with depression on 7/15 and placed on Lexapro but had not been taking it. Patient was seen and initially considered not to be a danger to himself or others with the plan to follow up with outpatient psychiatric and psychotherapy services, however the  psychiatry team was recalled because patient was said to be making threatening statements to his wife and again endorsing SI. His worsening mood and anxiety appears more likely in the context of fears of his wife leaving him.     On evaluation, pt presents with anxiety but more fluent speech. He states he is in danger but unsure of who he is targeting him and does not feel safe. He has some anxiety, low mood, paranoia. He is noted with poor appetite but improving sleep. He denies SI/HI. He has been adherent to medications and isolative to room but encouraged to join groups.    #MDD with psychotic fx  #Anxiety  -c/w Lexapro 10 mg daily  -start Zyprexa 2.5 mg qhs  -encourage to particpate in groups    #Agitation  -for agitation not amenable to verbal redirection, may give haldol 3 mg q6h prn and/or ativan 1 mg q6h prn prn with escalation to IM if pt is refusing PO and is an acute danger to self or/and others with repeat EKG to ensure QTc <500 ms   Interval Chief Complaint: "Im okay"  Interval History: Pt seen and evaluated, chart reviewed. As per nursing report, no acute events overnight, no PRNs. On evaluation, pt presents sitting in bed, making eye contact, and cooperative. Patients speech has been more fluent. He has some anxiety and paranoia. He also states he is in danger but unsure of who is targeting him and does not feel safe. Patient is also worried for his family and says their "unsafe" but could not elaborate on why. He denies AH or VH. He reports poor appetite and but improving sleep. He denies SI/HI, intent and plan. He denies negative side effects of medications. Patient has been isolative to room but encouraged to participate in groups.     Mental Status Exam:  Level of Consciousness: Alert  General Appearance: No deformities present  Body Habitus: Average build  Hygiene: Fair  Grooming: Fair  Behavior: Cooperative  Eye Contact: Poor  Relatedness: Poor  Impulse Control: Normal  Muscle Tone/Strength: Unable to assess  Abnormal Movements: No abnormal movements  Gait/Station: Unable to assess  Speech: Normal volume, rate, productivity  Mood: Depressed, Anxious  Affect Quality: Depressed, Anxious  Affect Range: Constricted  Affect Congruence: Congruent  Thought Process: Perseverative  Thought Associations: Normal  Thought Content: Delusions, Preoccupations, Hopelessness  Perceptions: No abnormalities  Orientation: Oriented to time, place, person, situation  Attention/Concentration: Normal  Estimated Intelligence: Average  Recent Memory: Impaired  Remote Memory: Impaired  Fund of Knowledge: Normal  How Fund of Knowledge Assessed: Vocabulary  Language: No abnormalities noted  Judgment: Fair  Insight: Fair    Assessment Summary: 60 y/o man, resides with his wife and 2 adult sons, retired supervisor at [employer], with a PMH of HTN and recent chronic lacunar infarct with no residual deficits diagnosed in July 2024, and PPH of recent dx of depression who was admitted to the medical floor for the evaluation of AMS with negative medical workup, and transferred to [facility] for further management of SI and threatening statements made to his wife I/s/o ongoing marital conflicts. Patient had admitted to infidelity to his wife resulting in marital stressors, was diagnosed with depression on 7/15 and placed on Lexapro but had not been taking it. Patient was seen and initially considered not to be a danger to himself or others with the plan to follow up with outpatient psychiatric and psychotherapy services, however the  psychiatry team was recalled because patient was said to be making threatening statements to his wife and again endorsing SI. His worsening mood and anxiety appears more likely in the context of fears of his wife leaving him.     On evaluation, pt presents with some anxiety and paranoia but has a more fluent speech. He states he is in danger but unsure of who he is targeting him and does not feel safe. He has some anxiety, low mood, paranoia. He is noted with poor appetite but improving sleep. He denies SI/HI. He has been adherent to medications and isolative to room but encouraged to join groups.    #MDD with psychotic fx  #Anxiety  -c/w Lexapro 10 mg daily  -start Zyprexa 2.5 mg qhs  -encourage to particpate in groups    #Agitation  -for agitation not amenable to verbal redirection, may give haldol 3 mg q6h prn and/or ativan 1 mg q6h prn prn with escalation to IM if pt is refusing PO and is an acute danger to self or/and others with repeat EKG to ensure QTc <500 ms   Interval Chief Complaint: "Im okay"  Interval History: Pt seen and evaluated, chart reviewed. As per nursing report, no acute events overnight, no PRNs. On evaluation, pt presents sitting in bed, making eye contact, and cooperative. Patients speech has been more fluent. He has some anxiety and paranoia. He also states he is in danger but unsure of who is targeting him and does not feel safe. Patient is also worried for his family and says their "unsafe" but could not elaborate on why. He denies AH or VH. He reports poor appetite and but improving sleep. He denies SI/HI, intent and plan. He denies negative side effects of medications. Patient has been isolative to room but encouraged to participate in groups.     Mental Status Exam:  Level of Consciousness: Alert  General Appearance: No deformities present  Body Habitus: Average build  Hygiene: Fair  Grooming: Fair  Behavior: Cooperative  Eye Contact: Fair  Relatedness: Poor  Impulse Control: Normal  Muscle Tone/Strength: Unable to assess  Abnormal Movements: No abnormal movements  Gait/Station: Normal  Speech: Normal volume, rate, productivity  Mood: "Better"  Affect Quality: Depressed, Anxious  Affect Range: Constricted  Affect Congruence: Congruent  Thought Process: Perseverative  Thought Associations: Normal  Thought Content: Delusions, Preoccupations  Perceptions: No abnormalities  Orientation: Oriented to time, place, person, situation  Attention/Concentration: Normal  Estimated Intelligence: Average  Recent Memory: Impaired  Remote Memory: Impaired  Fund of Knowledge: Normal  How Fund of Knowledge Assessed: Vocabulary  Language: No abnormalities noted  Judgment: Fair  Insight: Fair    Assessment Summary: 60 y/o man, resides with his wife and 2 adult sons, retired supervisor at [employer], with a PMH of HTN and recent chronic lacunar infarct with no residual deficits diagnosed in July 2024, and PPH of recent dx of depression who was admitted to the medical floor for the evaluation of AMS with negative medical workup, and transferred to [facility] for further management of SI and threatening statements made to his wife I/s/o ongoing marital conflicts. Patient had admitted to infidelity to his wife resulting in marital stressors, was diagnosed with depression on 7/15 and placed on Lexapro but had not been taking it. Patient was seen and initially considered not to be a danger to himself or others with the plan to follow up with outpatient psychiatric and psychotherapy services, however the  psychiatry team was recalled because patient was said to be making threatening statements to his wife and again endorsing SI. His worsening mood and anxiety appears more likely in the context of fears of his wife leaving him.     On evaluation, pt presents with some anxiety and paranoia but has a more fluent speech. He states he is in danger but unsure of who he is targeting him and does not feel safe. He has some anxiety, low mood, paranoia. He is noted with poor appetite but improving sleep. He denies SI/HI. He has been adherent to medications and isolative to room but encouraged to join groups.    #MDD with psychotic fx  #Anxiety  -c/w Lexapro 10 mg daily  -start Zyprexa 2.5 mg qhs  -encourage to particpate in groups    #Agitation  -for agitation not amenable to verbal redirection, may give haldol 3 mg q6h prn and/or ativan 1 mg q6h prn prn with escalation to IM if pt is refusing PO and is an acute danger to self or/and others with repeat EKG to ensure QTc <500 ms

## 2024-08-06 NOTE — BH INPATIENT PSYCHIATRY PROGRESS NOTE - NSBHMETABOLIC_PSY_ALL_CORE_FT
BMI: BMI (kg/m2): 24 (08-02-24 @ 14:17)  HbA1c: A1C with Estimated Average Glucose Result: 5.6 % (07-05-24 @ 12:37)    Glucose: POCT Blood Glucose.: 82 mg/dL (07-25-24 @ 00:24)    BP: 129/87 (08-05-24 @ 16:00) (109/76 - 139/93)Vital Signs Last 24 Hrs  T(C): 36.7 (08-05-24 @ 16:00), Max: 36.7 (08-05-24 @ 16:00)  T(F): 98 (08-05-24 @ 16:00), Max: 98 (08-05-24 @ 16:00)  HR: 94 (08-05-24 @ 16:00) (94 - 94)  BP: 129/87 (08-05-24 @ 16:00) (129/87 - 129/87)  BP(mean): --  RR: 18 (08-05-24 @ 16:00) (18 - 18)  SpO2: --      Lipid Panel: Date/Time: 07-05-24 @ 12:37  Cholesterol, Serum: 126  LDL Cholesterol Calculated: 58  HDL Cholesterol, Serum: 52  Total Cholesterol/HDL Ration Measurement: --  Triglycerides, Serum: 77

## 2024-08-06 NOTE — BH INPATIENT PSYCHIATRY PROGRESS NOTE - CURRENT MEDICATION
MEDICATIONS  (STANDING):  amLODIPine   Tablet 5 milliGRAM(s) Oral daily  aspirin enteric coated 81 milliGRAM(s) Oral daily  atorvastatin 10 milliGRAM(s) Oral at bedtime  escitalopram 10 milliGRAM(s) Oral daily  OLANZapine 2.5 milliGRAM(s) Oral at bedtime    MEDICATIONS  (PRN):  hydrOXYzine hydrochloride 25 milliGRAM(s) Oral every 6 hours PRN anxiety/insomnia  LORazepam     Tablet 1 milliGRAM(s) Oral every 8 hours PRN physical aggression  OLANZapine 2.5 milliGRAM(s) Oral every 6 hours PRN agitation  senna 2 Tablet(s) Oral daily PRN constipation   MEDICATIONS  (STANDING):  amLODIPine   Tablet 5 milliGRAM(s) Oral daily  aspirin enteric coated 81 milliGRAM(s) Oral daily  atorvastatin 10 milliGRAM(s) Oral at bedtime  escitalopram 10 milliGRAM(s) Oral daily  OLANZapine 5 milliGRAM(s) Oral at bedtime    MEDICATIONS  (PRN):  hydrOXYzine hydrochloride 25 milliGRAM(s) Oral every 6 hours PRN anxiety/insomnia  LORazepam     Tablet 1 milliGRAM(s) Oral every 8 hours PRN physical aggression  OLANZapine 2.5 milliGRAM(s) Oral every 6 hours PRN agitation  senna 2 Tablet(s) Oral daily PRN constipation

## 2024-08-06 NOTE — BH INPATIENT PSYCHIATRY PROGRESS NOTE - PRN MEDS
MEDICATIONS  (PRN):  hydrOXYzine hydrochloride 25 milliGRAM(s) Oral every 6 hours PRN anxiety/insomnia  LORazepam     Tablet 1 milliGRAM(s) Oral every 8 hours PRN physical aggression  OLANZapine 2.5 milliGRAM(s) Oral every 6 hours PRN agitation  senna 2 Tablet(s) Oral daily PRN constipation

## 2024-08-06 NOTE — BH CHART NOTE - NSEVENTNOTEFT_PSY_ALL_CORE
Spoke to spouse Kianna at [phone] to update her on patients process. Spouse was concerned about pts suicidal thoughts but patient has not mentioned any suicidal thoughts or plans today. Patient has had poor sleep and appetite. Explained patient started on new medication Zyprexa at 2.5mg and will slowly increase dose. Will monitor to see if patients paranoia improves. Spouse will be visiting today from 1-3.

## 2024-08-06 NOTE — BH INPATIENT PSYCHIATRY PROGRESS NOTE - NSBHFUPINTERVALHXFT_PSY_A_CORE
Pt seen and evaluated, chart reviewed. As per nursing report, pt with poor PO intake otherwise no other acute events, no PRNs. On evaluation, pt presents sitting in bed, noted to be with hernandez affect and improved eye contact. He reports he is "feeling a bit better". He attributes improvement to decrease in anxiety episodes and more motivation to get better. He reports his thoughts also feel "more clear", states he was able to sleep well last night. He reports continued limited appetite, agrees to Ensure supplementation. He denies AVH. He admits to continued paranoia, states he believes he in danger because of "what he can do", but then is unable to elaborate further and states he does not know who is targeting him. He also states he worries that his family is in danger but he is unsure why. He denies AH or VH. He reports fair appetite despite staff report of poor PO intake. He reports disrupted sleep and feeling tired. He denies SI/HI, intent and plan. He denies negative side effects of medications. Has been isolative to room, encouraged groups/DBT. Pt seen and evaluated, chart reviewed. As per nursing report, pt with poor PO intake otherwise no other acute events, no PRNs. On evaluation, pt presents sitting in bed, noted to be with hernandez affect and improved eye contact. He reports he is "feeling a bit better". He attributes improvement to decrease in anxiety episodes and more motivation to get better. He reports his thoughts also feel "more clear", states he was able to sleep well last night. He reports continued limited appetite, agrees to Ensure supplementation. He denies AVH. He admits to continued paranoia, states he believes he in danger because of "what he can do", but then is unable to elaborate further and states he does not know who is targeting him. He also states he worries that his family is in danger but he is unsure why. He denies AH or VH. He denies SI/HI, intent and plan. He denies negative side effects of medications. Has been isolative to room, encouraged groups/DBT. Pt seen and evaluated, chart reviewed. As per nursing report, pt with poor PO intake otherwise no other acute events, no PRNs. On evaluation, pt presents sitting in bed, noted to be with hernandez affect and improved eye contact. He reports he is "feeling a bit better". He attributes improvement to decrease in anxiety episodes and more motivation to get better. He reports his thoughts also feel "more clear" and reports benefit of zyprexa in helping him sleep better last night. He reports continued limited appetite, agrees to Ensure supplementation. He denies AVH. He admits to continued paranoia, states he believes he in danger because of "what he can do", but then is unable to elaborate further and states he does not know who is targeting him. He also states he worries that his family is in danger but he is unsure why. He denies AH or VH. He denies SI/HI, intent and plan. He denies negative side effects of medications. Has been isolative to room, encouraged groups/DBT.

## 2024-08-06 NOTE — BH INPATIENT PSYCHIATRY PROGRESS NOTE - NSBHCHARTREVIEWVS_PSY_A_CORE FT
Vital Signs Last 24 Hrs  T(C): 36.7 (08-05-24 @ 16:00), Max: 36.7 (08-05-24 @ 16:00)  T(F): 98 (08-05-24 @ 16:00), Max: 98 (08-05-24 @ 16:00)  HR: 94 (08-05-24 @ 16:00) (94 - 94)  BP: 129/87 (08-05-24 @ 16:00) (129/87 - 129/87)  BP(mean): --  RR: 18 (08-05-24 @ 16:00) (18 - 18)  SpO2: --

## 2024-08-07 PROCEDURE — 99232 SBSQ HOSP IP/OBS MODERATE 35: CPT

## 2024-08-07 RX ADMIN — Medication 10 MILLIGRAM(S): at 20:06

## 2024-08-07 RX ADMIN — OLANZAPINE 5 MILLIGRAM(S): 7.5 TABLET ORAL at 20:06

## 2024-08-07 RX ADMIN — ESCITALOPRAM OXALATE 10 MILLIGRAM(S): 10 TABLET ORAL at 08:06

## 2024-08-07 RX ADMIN — Medication 2 TABLET(S): at 11:18

## 2024-08-07 RX ADMIN — AMLODIPINE BESYLATE 5 MILLIGRAM(S): 10 TABLET ORAL at 08:06

## 2024-08-07 RX ADMIN — Medication 81 MILLIGRAM(S): at 08:06

## 2024-08-07 NOTE — BH INPATIENT PSYCHIATRY PROGRESS NOTE - NSBHMETABOLIC_PSY_ALL_CORE_FT
BMI: BMI (kg/m2): 24 (08-02-24 @ 14:17)  HbA1c: A1C with Estimated Average Glucose Result: 5.6 % (07-05-24 @ 12:37)    Glucose: POCT Blood Glucose.: 82 mg/dL (07-25-24 @ 00:24)    BP: 130/83 (08-07-24 @ 08:32) (110/84 - 135/91)Vital Signs Last 24 Hrs  T(C): 36.8 (08-07-24 @ 08:32), Max: 36.8 (08-07-24 @ 08:32)  T(F): 98.2 (08-07-24 @ 08:32), Max: 98.2 (08-07-24 @ 08:32)  HR: 108 (08-07-24 @ 08:32) (106 - 108)  BP: 130/83 (08-07-24 @ 08:32) (118/84 - 130/83)  BP(mean): --  RR: 18 (08-07-24 @ 08:32) (16 - 18)  SpO2: --      Lipid Panel: Date/Time: 07-05-24 @ 12:37  Cholesterol, Serum: 126  LDL Cholesterol Calculated: 58  HDL Cholesterol, Serum: 52  Total Cholesterol/HDL Ration Measurement: --  Triglycerides, Serum: 77

## 2024-08-07 NOTE — BH INPATIENT PSYCHIATRY PROGRESS NOTE - CURRENT MEDICATION
Refill request has been sent to  For approval    MEDICATIONS  (STANDING):  amLODIPine   Tablet 5 milliGRAM(s) Oral daily  aspirin enteric coated 81 milliGRAM(s) Oral daily  atorvastatin 10 milliGRAM(s) Oral at bedtime  escitalopram 10 milliGRAM(s) Oral daily  OLANZapine 5 milliGRAM(s) Oral at bedtime    MEDICATIONS  (PRN):  hydrOXYzine hydrochloride 25 milliGRAM(s) Oral every 6 hours PRN anxiety/insomnia  LORazepam     Tablet 1 milliGRAM(s) Oral every 8 hours PRN physical aggression  OLANZapine 2.5 milliGRAM(s) Oral every 6 hours PRN agitation  senna 2 Tablet(s) Oral daily PRN constipation

## 2024-08-07 NOTE — BH INPATIENT PSYCHIATRY PROGRESS NOTE - NSBHCHARTREVIEWVS_PSY_A_CORE FT
Vital Signs Last 24 Hrs  T(C): 36.8 (08-07-24 @ 08:32), Max: 36.8 (08-07-24 @ 08:32)  T(F): 98.2 (08-07-24 @ 08:32), Max: 98.2 (08-07-24 @ 08:32)  HR: 108 (08-07-24 @ 08:32) (106 - 108)  BP: 130/83 (08-07-24 @ 08:32) (118/84 - 130/83)  BP(mean): --  RR: 18 (08-07-24 @ 08:32) (16 - 18)  SpO2: --

## 2024-08-07 NOTE — BH INPATIENT PSYCHIATRY PROGRESS NOTE - NSBHFUPINTERVALHXFT_PSY_A_CORE
Pt seen and evaluated, chart reviewed. As per nursing report, pt observed with improving PO intake, no acute events, no PRNs. On evaluation, pt presents sitting in bed, noted to be with hernandez affect and improved eye contact than prior evaluation, continues to appear fidgety. He reports he is "better", which he attributes to improving appetite and motivation to attend more groups today. He also reports decrease in paranoid delusions, states he is feeling safer. He admits to continued anxious ruminations regarding his circumstances, which he states is his current hospitalization and his marital conflicts with his wife. Emotional support provided, reinforced coping skills. He denies AVH, SI/HI, negative side effects of medications. Encouraged groups/DBT.     Spoke with pt's wife- updated on POC, she reports pt was with paranoia in the last 7 weeks prior to admission, often checking on their children to make sure they were safe and isolating self in their room.

## 2024-08-07 NOTE — BH INPATIENT PSYCHIATRY PROGRESS NOTE - NSBHASSESSSUMMFT_PSY_ALL_CORE
58 y/o man, resides with his wife and 2 adult sons, retired supervisor at [employer], with a PMH of HTN and recent chronic lacunar infarct with no residual deficits diagnosed in July 2024, and PPH of recent dx of depression who was admitted to the medical floor for the evaluation of AMS with negative medical workup, and transferred to [facility] for further management of SI and threatening statements made to his wife I/s/o ongoing marital conflicts. Patient had admitted to infidelity to his wife resulting in marital stressors, was diagnosed with depression on 7/15 and placed on Lexapro but had not been taking it. Patient was seen and initially considered not to be a danger to himself or others with the plan to follow up with outpatient psychiatric and psychotherapy services, however the  psychiatry team was recalled because patient was said to be making threatening statements to his wife and again endorsing SI. His worsening mood and anxiety appears more likely in the context of fears of his wife leaving him.     On evaluation, pt presents with hernandez affect and more fluid speech, improving eye contact. He reports feeling a bit better with improvement in his sleep and anxiety. He continues to endorse limited appetite and paranoia, is unable to state who or why. He denies SI/HI, appears more future-oriented and endorses motivation get better. Pt has been isolative to room, encouraged groups/DBT, has not been a behavioral concern.     #MDD with psychotic fx  #Anxiety  -c/w lexapro 10 mg daily  -c/w zyprexa 5 mg qhs  -encourage groups/DBT    #Agitation  -for agitation not amenable to verbal redirection, may give haldol 3 mg q6h prn and/or ativan 1 mg q6h prn prn with escalation to IM if pt is refusing PO and is an acute danger to self or/and others with repeat EKG to ensure QTc <500 ms

## 2024-08-08 PROCEDURE — 99232 SBSQ HOSP IP/OBS MODERATE 35: CPT

## 2024-08-08 RX ORDER — ESCITALOPRAM OXALATE 10 MG/1
15 TABLET ORAL DAILY
Refills: 0 | Status: DISCONTINUED | OUTPATIENT
Start: 2024-08-09 | End: 2024-08-16

## 2024-08-08 RX ADMIN — ESCITALOPRAM OXALATE 10 MILLIGRAM(S): 10 TABLET ORAL at 08:38

## 2024-08-08 RX ADMIN — AMLODIPINE BESYLATE 5 MILLIGRAM(S): 10 TABLET ORAL at 08:37

## 2024-08-08 RX ADMIN — Medication 10 MILLIGRAM(S): at 20:12

## 2024-08-08 RX ADMIN — Medication 81 MILLIGRAM(S): at 08:37

## 2024-08-08 RX ADMIN — OLANZAPINE 5 MILLIGRAM(S): 7.5 TABLET ORAL at 20:12

## 2024-08-08 NOTE — BH INPATIENT PSYCHIATRY PROGRESS NOTE - NSBHCHARTREVIEWVS_PSY_A_CORE FT
Vital Signs Last 24 Hrs  T(C): 36.4 (08-08-24 @ 05:51), Max: 36.6 (08-07-24 @ 16:22)  T(F): 97.5 (08-08-24 @ 05:51), Max: 97.8 (08-07-24 @ 16:22)  HR: 106 (08-08-24 @ 05:51) (90 - 106)  BP: 122/90 (08-08-24 @ 05:51) (120/84 - 122/90)  BP(mean): --  RR: 17 (08-08-24 @ 05:51) (16 - 17)  SpO2: --     Vital Signs Last 24 Hrs  T(C): 36.3 (08-08-24 @ 08:00), Max: 36.6 (08-07-24 @ 16:22)  T(F): 97.4 (08-08-24 @ 08:00), Max: 97.8 (08-07-24 @ 16:22)  HR: 88 (08-08-24 @ 08:00) (88 - 106)  BP: 116/87 (08-08-24 @ 08:00) (116/87 - 122/90)  BP(mean): --  RR: 17 (08-08-24 @ 08:00) (16 - 17)  SpO2: --

## 2024-08-08 NOTE — BH CHART NOTE - NSEVENTNOTEFT_PSY_ALL_CORE
Spoke to spouse Kianna at [phone] to update her on patients process. Spouse was proud patient called her and still on a regimen. Spouse encourages him to participate in groups once a day. Spouse has concerns about pt is being distracted "he sits quiet" because he thinks others are listening to him and tracking him down. Will monitor to see if patients paranoia improves. Spouse will be visiting today from 1   Spoke to spouse Kianna at [phone]- updated on POC. Spouse was proud patient called her and still on a regimen. Spouse encourages him to participate in groups once a day. Spouse has concerns about pt is being distracted "he sits quiet" because he thinks others are listening to him and tracking him down

## 2024-08-08 NOTE — BH INPATIENT PSYCHIATRY PROGRESS NOTE - NSBHFUPINTERVALHXFT_PSY_A_CORE
Pt seen and evaluated, chart reviewed. As per nursing report, pt observed with improving PO intake, no acute events, no PRNs. On evaluation, pt presents sitting in bed, noted to be with hernandez affect and improved eye contact than prior evaluation, continues to appear fidgety. He reports he is "better", which he attributes to improving appetite and motivation to attend more groups today. He also reports decrease in paranoid delusions, states he is feeling safer. He admits to continued anxious ruminations regarding his circumstances, which he states is his current hospitalization and his marital conflicts with his wife. Emotional support provided, reinforced coping skills. He denies AVH, SI/HI, negative side effects of medications. Encouraged groups/DBT.     Spoke with pt's wife- updated on POC, she reports pt was with paranoia in the last 7 weeks prior to admission, often checking on their children to make sure they were safe and isolating self in their room.  Pt seen and evaluated, chart reviewed. As per nursing report, pt observed with improving PO intake, no acute events, no PRNs. On evaluation, pt presents sitting in bed, noted to be with hernandez affect and improved eye contact than prior evaluation, continues to appear fidgety. He reports he is "better", which he attributes to improving appetite and motivation to attend more groups today. He also reports decrease in paranoid delusions, states he is feeling safer. He admits to continued anxious ruminations regarding his circumstances, which he states is his current hospitalization and his marital conflicts with his wife. Emotional support provided, reinforced coping skills. He denies AVH, SI/HI, negative side effects of medications. Encouraged groups/DBT.

## 2024-08-08 NOTE — BH INPATIENT PSYCHIATRY PROGRESS NOTE - CURRENT MEDICATION
MEDICATIONS  (STANDING):  amLODIPine   Tablet 5 milliGRAM(s) Oral daily  aspirin enteric coated 81 milliGRAM(s) Oral daily  atorvastatin 10 milliGRAM(s) Oral at bedtime  OLANZapine 5 milliGRAM(s) Oral at bedtime    MEDICATIONS  (PRN):  hydrOXYzine hydrochloride 25 milliGRAM(s) Oral every 6 hours PRN anxiety/insomnia  LORazepam     Tablet 1 milliGRAM(s) Oral every 8 hours PRN physical aggression  OLANZapine 2.5 milliGRAM(s) Oral every 6 hours PRN agitation  senna 2 Tablet(s) Oral daily PRN constipation

## 2024-08-08 NOTE — BH INPATIENT PSYCHIATRY PROGRESS NOTE - NSBHASSESSSUMMFT_PSY_ALL_CORE
58 y/o man, resides with his wife and 2 adult sons, retired supervisor at [employer], with a PMH of HTN and recent chronic lacunar infarct with no residual deficits diagnosed in July 2024, and PPH of recent dx of depression who was admitted to the medical floor for the evaluation of AMS with negative medical workup, and transferred to [facility] for further management of SI and threatening statements made to his wife I/s/o ongoing marital conflicts. Patient had admitted to infidelity to his wife resulting in marital stressors, was diagnosed with depression on 7/15 and placed on Lexapro but had not been taking it. Patient was seen and initially considered not to be a danger to himself or others with the plan to follow up with outpatient psychiatric and psychotherapy services, however the  psychiatry team was recalled because patient was said to be making threatening statements to his wife and again endorsing SI. His worsening mood and anxiety appears more likely in the context of fears of his wife leaving him.     On evaluation, pt continues to present with hernandez affect and more fluid speech, improved eye contact and ADLs. He reports feeling a bit better with improvement in his sleep, appetite and paranoia with resolution of SI. He admits to continued low mood and anxious ruminations. He endorses decrease in paranoid delusions and states he is feeling safer, observed to be more visible on unit.    #MDD with psychotic fx  #Anxiety  -titrate lexapro 15 mg daily  -c/w zyprexa 5 mg qhs  -encourage groups/DBT    #Agitation  -for agitation not amenable to verbal redirection, may give haldol 3 mg q6h prn and/or ativan 1 mg q6h prn prn with escalation to IM if pt is refusing PO and is an acute danger to self or/and others with repeat EKG to ensure QTc <500 ms

## 2024-08-08 NOTE — BH INPATIENT PSYCHIATRY PROGRESS NOTE - NSBHMETABOLIC_PSY_ALL_CORE_FT
BMI: BMI (kg/m2): 24 (08-02-24 @ 14:17)  HbA1c: A1C with Estimated Average Glucose Result: 5.6 % (07-05-24 @ 12:37)    Glucose: POCT Blood Glucose.: 82 mg/dL (07-25-24 @ 00:24)    BP: 122/90 (08-08-24 @ 05:51) (110/84 - 130/83)Vital Signs Last 24 Hrs  T(C): 36.4 (08-08-24 @ 05:51), Max: 36.6 (08-07-24 @ 16:22)  T(F): 97.5 (08-08-24 @ 05:51), Max: 97.8 (08-07-24 @ 16:22)  HR: 106 (08-08-24 @ 05:51) (90 - 106)  BP: 122/90 (08-08-24 @ 05:51) (120/84 - 122/90)  BP(mean): --  RR: 17 (08-08-24 @ 05:51) (16 - 17)  SpO2: --      Lipid Panel: Date/Time: 07-05-24 @ 12:37  Cholesterol, Serum: 126  LDL Cholesterol Calculated: 58  HDL Cholesterol, Serum: 52  Total Cholesterol/HDL Ration Measurement: --  Triglycerides, Serum: 77   BMI: BMI (kg/m2): 24 (08-02-24 @ 14:17)  HbA1c: A1C with Estimated Average Glucose Result: 5.6 % (07-05-24 @ 12:37)    Glucose: POCT Blood Glucose.: 82 mg/dL (07-25-24 @ 00:24)    BP: 116/87 (08-08-24 @ 08:00) (110/84 - 130/83)Vital Signs Last 24 Hrs  T(C): 36.3 (08-08-24 @ 08:00), Max: 36.6 (08-07-24 @ 16:22)  T(F): 97.4 (08-08-24 @ 08:00), Max: 97.8 (08-07-24 @ 16:22)  HR: 88 (08-08-24 @ 08:00) (88 - 106)  BP: 116/87 (08-08-24 @ 08:00) (116/87 - 122/90)  BP(mean): --  RR: 17 (08-08-24 @ 08:00) (16 - 17)  SpO2: --      Lipid Panel: Date/Time: 07-05-24 @ 12:37  Cholesterol, Serum: 126  LDL Cholesterol Calculated: 58  HDL Cholesterol, Serum: 52  Total Cholesterol/HDL Ration Measurement: --  Triglycerides, Serum: 77

## 2024-08-09 DIAGNOSIS — F41.9 ANXIETY DISORDER, UNSPECIFIED: ICD-10-CM

## 2024-08-09 DIAGNOSIS — Z86.73 PERSONAL HISTORY OF TRANSIENT ISCHEMIC ATTACK (TIA), AND CEREBRAL INFARCTION WITHOUT RESIDUAL DEFICITS: ICD-10-CM

## 2024-08-09 DIAGNOSIS — N17.9 ACUTE KIDNEY FAILURE, UNSPECIFIED: ICD-10-CM

## 2024-08-09 DIAGNOSIS — Z53.20 PROCEDURE AND TREATMENT NOT CARRIED OUT BECAUSE OF PATIENT'S DECISION FOR UNSPECIFIED REASONS: ICD-10-CM

## 2024-08-09 DIAGNOSIS — I10 ESSENTIAL (PRIMARY) HYPERTENSION: ICD-10-CM

## 2024-08-09 DIAGNOSIS — G93.49 OTHER ENCEPHALOPATHY: ICD-10-CM

## 2024-08-09 DIAGNOSIS — R45.851 SUICIDAL IDEATIONS: ICD-10-CM

## 2024-08-09 DIAGNOSIS — F33.2 MAJOR DEPRESSIVE DISORDER, RECURRENT SEVERE WITHOUT PSYCHOTIC FEATURES: ICD-10-CM

## 2024-08-09 PROCEDURE — 99232 SBSQ HOSP IP/OBS MODERATE 35: CPT

## 2024-08-09 RX ADMIN — AMLODIPINE BESYLATE 5 MILLIGRAM(S): 10 TABLET ORAL at 08:51

## 2024-08-09 RX ADMIN — OLANZAPINE 5 MILLIGRAM(S): 7.5 TABLET ORAL at 20:33

## 2024-08-09 RX ADMIN — ESCITALOPRAM OXALATE 15 MILLIGRAM(S): 10 TABLET ORAL at 08:51

## 2024-08-09 RX ADMIN — Medication 81 MILLIGRAM(S): at 08:51

## 2024-08-09 RX ADMIN — Medication 10 MILLIGRAM(S): at 20:33

## 2024-08-09 NOTE — BH INPATIENT PSYCHIATRY PROGRESS NOTE - NSBHMETABOLIC_PSY_ALL_CORE_FT
BMI: BMI (kg/m2): 24 (08-02-24 @ 14:17)  HbA1c: A1C with Estimated Average Glucose Result: 5.6 % (07-05-24 @ 12:37)    Glucose: POCT Blood Glucose.: 82 mg/dL (07-25-24 @ 00:24)    BP: 121/80 (08-09-24 @ 09:54) (116/87 - 131/89)Vital Signs Last 24 Hrs  T(C): 37.2 (08-09-24 @ 09:54), Max: 37.2 (08-09-24 @ 09:54)  T(F): 99 (08-09-24 @ 09:54), Max: 99 (08-09-24 @ 09:54)  HR: 109 (08-09-24 @ 09:54) (108 - 109)  BP: 121/80 (08-09-24 @ 09:54) (121/80 - 131/89)  BP(mean): --  RR: 18 (08-09-24 @ 09:54) (16 - 18)  SpO2: --      Lipid Panel: Date/Time: 07-05-24 @ 12:37  Cholesterol, Serum: 126  LDL Cholesterol Calculated: 58  HDL Cholesterol, Serum: 52  Total Cholesterol/HDL Ration Measurement: --  Triglycerides, Serum: 77

## 2024-08-09 NOTE — BH INPATIENT PSYCHIATRY PROGRESS NOTE - NSBHCHARTREVIEWVS_PSY_A_CORE FT
Vital Signs Last 24 Hrs  T(C): 37.2 (08-09-24 @ 09:54), Max: 37.2 (08-09-24 @ 09:54)  T(F): 99 (08-09-24 @ 09:54), Max: 99 (08-09-24 @ 09:54)  HR: 109 (08-09-24 @ 09:54) (108 - 109)  BP: 121/80 (08-09-24 @ 09:54) (121/80 - 131/89)  BP(mean): --  RR: 18 (08-09-24 @ 09:54) (16 - 18)  SpO2: --

## 2024-08-09 NOTE — BH INPATIENT PSYCHIATRY PROGRESS NOTE - NSBHASSESSSUMMFT_PSY_ALL_CORE
60 y/o man, resides with his wife and 2 adult sons, retired supervisor at [employer], with a PMH of HTN and recent chronic lacunar infarct with no residual deficits diagnosed in July 2024, and PPH of recent dx of depression who was admitted to the medical floor for the evaluation of AMS with negative medical workup, and transferred to [facility] for further management of SI and threatening statements made to his wife I/s/o ongoing marital conflicts. Patient had admitted to infidelity to his wife resulting in marital stressors, was diagnosed with depression on 7/15 and placed on Lexapro but had not been taking it. Patient was seen and initially considered not to be a danger to himself or others with the plan to follow up with outpatient psychiatric and psychotherapy services, however the  psychiatry team was recalled because patient was said to be making threatening statements to his wife and again endorsing SI. His worsening mood and anxiety appears more likely in the context of fears of his wife leaving him.     On evaluation, pt continues to present with hernandez affect and more fluid speech, improved eye contact and ADLs. He reports feeling a bit better with improvement in his sleep, appetite and paranoia with resolution of SI. He admits to continued low mood and anxious ruminations. He endorses decrease in paranoid delusions and states he is feeling safer, observed to be more visible on unit and has not been a behavioral concern.    #MDD with psychotic fx  #Anxiety  -c/w lexapro 15 mg daily  -c/w zyprexa 5 mg qhs  -encourage groups/DBT    #Agitation  -for agitation not amenable to verbal redirection, may give haldol 3 mg q6h prn and/or ativan 1 mg q6h prn prn with escalation to IM if pt is refusing PO and is an acute danger to self or/and others with repeat EKG to ensure QTc <500 ms

## 2024-08-09 NOTE — BH INPATIENT PSYCHIATRY PROGRESS NOTE - CURRENT MEDICATION
Pulmonary & Critical Care Inpatient Progress Note   Pati Metcalf MD     REASON FOR TODAY'S VISIT:  Acute resp failure    SUBJECTIVE:   Remains on vent support with high PEEP and FIO2 requirements  Hypotensive on vasopressor gtt, arterial line lost. Attempted to place at bedside today unsuccessfully     Scheduled Meds:   hydrocortisone sodium succinate PF  50 mg Intravenous Q6H    thiamine (VITAMIN B1) IVPB  200 mg Intravenous Q12H    ascorbic acid  1,500 mg Intravenous Q6H    carboxymethylcellulose PF  1 drop Both Eyes 6 times per day    mupirocin   Nasal BID    ipratropium-albuterol  1 ampule Inhalation Q4H    midazolam  2 mg Intravenous Once    chlorhexidine  15 mL Mouth/Throat BID    famotidine (PEPCID) injection  20 mg Intravenous BID    meropenem  1 g Intravenous Q8H    bisacodyl  5 mg Oral Daily    levothyroxine  25 mcg Oral Daily    sodium chloride flush  10 mL Intravenous 2 times per day       Continuous Infusions:   vasopressin (Septic Shock) infusion 0.04 Units/min (10/03/20 1242)    EPINEPHrine infusion Stopped (10/02/20 1405)    fentaNYL (SUBLIMAZE) infusion 50 mcg/hr (10/03/20 0113)    norepinephrine 4 mcg/min (10/03/20 1710)    midazolam 2 mg/hr (10/03/20 2049)       PRN Meds:  potassium chloride, magnesium sulfate, midazolam, dextrose, bisacodyl, sodium chloride flush, [DISCONTINUED] acetaminophen **OR** acetaminophen, promethazine **OR** ondansetron    ALLERGIES:  Patient is allergic to chocolate; eggs [egg white]; food; orange juice [orange oil]; peanut butter flavor [flavoring agent]; penicillins; strawberry (diagnostic); and tape [adhesive tape]. Objective:   PHYSICAL EXAM:  BP 89/60   Pulse 97   Temp 98.6 °F (37 °C) (Bladder)   Resp 20   Ht 4' 8\" (1.422 m)   Wt 154 lb 15.7 oz (70.3 kg)   SpO2 96%   BMI 34.75 kg/m²    Physical Exam  Constitutional:       General: He is not in acute distress. Appearance: He is well-developed. He is not diaphoretic.    HENT:      Head: MEDICATIONS  (STANDING):  amLODIPine   Tablet 5 milliGRAM(s) Oral daily  aspirin enteric coated 81 milliGRAM(s) Oral daily  atorvastatin 10 milliGRAM(s) Oral at bedtime  escitalopram 15 milliGRAM(s) Oral daily  OLANZapine 5 milliGRAM(s) Oral at bedtime    MEDICATIONS  (PRN):  hydrOXYzine hydrochloride 25 milliGRAM(s) Oral every 6 hours PRN anxiety/insomnia  LORazepam     Tablet 1 milliGRAM(s) Oral every 8 hours PRN physical aggression  OLANZapine 2.5 milliGRAM(s) Oral every 6 hours PRN agitation  senna 2 Tablet(s) Oral daily PRN constipation

## 2024-08-09 NOTE — BH INPATIENT PSYCHIATRY PROGRESS NOTE - NSBHFUPINTERVALHXFT_PSY_A_CORE
Pt seen and evaluated, chart reviewed. As per nursing report, no acute events overnight, no PRNs. On evaluation, pt presents well-groomed and standing in front of his room, noted to be with hernandez affect and improved eye contact, less fidgety. He reports he is "better", which he attributes to continued improving appetite and sleep. He states he is eating at least half his meals and only woke once overnight. He admits to continued anxious ruminations on current circumstance, agrees to attend more groups today. He reports resolution of paranoid delusions, states he is feels safe and is looking to forward to return home to be with his family. He denies AVH, SI/HI, negative side effects of medications.

## 2024-08-10 RX ADMIN — AMLODIPINE BESYLATE 5 MILLIGRAM(S): 10 TABLET ORAL at 08:04

## 2024-08-10 RX ADMIN — Medication 25 MILLIGRAM(S): at 17:52

## 2024-08-10 RX ADMIN — OLANZAPINE 2.5 MILLIGRAM(S): 7.5 TABLET ORAL at 17:51

## 2024-08-10 RX ADMIN — Medication 81 MILLIGRAM(S): at 08:06

## 2024-08-10 RX ADMIN — ESCITALOPRAM OXALATE 15 MILLIGRAM(S): 10 TABLET ORAL at 08:00

## 2024-08-10 RX ADMIN — Medication 10 MILLIGRAM(S): at 20:00

## 2024-08-10 RX ADMIN — OLANZAPINE 5 MILLIGRAM(S): 7.5 TABLET ORAL at 20:00

## 2024-08-11 RX ADMIN — OLANZAPINE 5 MILLIGRAM(S): 7.5 TABLET ORAL at 20:05

## 2024-08-11 RX ADMIN — Medication 81 MILLIGRAM(S): at 08:06

## 2024-08-11 RX ADMIN — Medication 10 MILLIGRAM(S): at 20:05

## 2024-08-11 RX ADMIN — ESCITALOPRAM OXALATE 15 MILLIGRAM(S): 10 TABLET ORAL at 08:06

## 2024-08-11 RX ADMIN — AMLODIPINE BESYLATE 5 MILLIGRAM(S): 10 TABLET ORAL at 08:06

## 2024-08-12 PROCEDURE — 99232 SBSQ HOSP IP/OBS MODERATE 35: CPT

## 2024-08-12 RX ORDER — OLANZAPINE 7.5 MG/1
7.5 TABLET ORAL AT BEDTIME
Refills: 0 | Status: DISCONTINUED | OUTPATIENT
Start: 2024-08-12 | End: 2024-08-14

## 2024-08-12 RX ADMIN — Medication 10 MILLIGRAM(S): at 20:12

## 2024-08-12 RX ADMIN — ESCITALOPRAM OXALATE 15 MILLIGRAM(S): 10 TABLET ORAL at 08:53

## 2024-08-12 RX ADMIN — AMLODIPINE BESYLATE 5 MILLIGRAM(S): 10 TABLET ORAL at 05:30

## 2024-08-12 RX ADMIN — Medication 81 MILLIGRAM(S): at 08:53

## 2024-08-12 RX ADMIN — OLANZAPINE 7.5 MILLIGRAM(S): 7.5 TABLET ORAL at 20:12

## 2024-08-12 NOTE — BH INPATIENT PSYCHIATRY PROGRESS NOTE - NSBHASSESSSUMMFT_PSY_ALL_CORE
60 y/o man, resides with his wife and 2 adult sons, retired supervisor at [employer], with a PMH of HTN and recent chronic lacunar infarct with no residual deficits diagnosed in July 2024, and PPH of recent dx of depression who was admitted to the medical floor for the evaluation of AMS with negative medical workup, and transferred to [facility] for further management of SI and threatening statements made to his wife I/s/o ongoing marital conflicts. Patient had admitted to infidelity to his wife resulting in marital stressors, was diagnosed with depression on 7/15 and placed on Lexapro but had not been taking it. Patient was seen and initially considered not to be a danger to himself or others with the plan to follow up with outpatient psychiatric and psychotherapy services, however the  psychiatry team was recalled because patient was said to be making threatening statements to his wife and again endorsing SI. His worsening mood and anxiety appears more likely in the context of fears of his wife leaving him.     On evaluation, pt continues to present with hernandez affect and more fluid speech, improved eye contact and ADLs. He endorses difficulty distinguishing real vs unreal, admits to continued paranoia that his life is in danger resulting in disrupted sleep and limited appetite. He denies SI and reports motivation to get better, has been adherent to medications and not a behavioral concern.     #MDD with psychotic fx  #Anxiety  -c/w lexapro 15 mg daily  -titrate zyprexa 7.5 mg qhs  -encourage groups/DBT    #Agitation  -for agitation not amenable to verbal redirection, may give haldol 3 mg q6h prn and/or ativan 1 mg q6h prn prn with escalation to IM if pt is refusing PO and is an acute danger to self or/and others with repeat EKG to ensure QTc <500 ms

## 2024-08-12 NOTE — BH INPATIENT PSYCHIATRY PROGRESS NOTE - NSBHCHARTREVIEWVS_PSY_A_CORE FT
Vital Signs Last 24 Hrs  T(C): 36.4 (08-12-24 @ 08:00), Max: 36.8 (08-11-24 @ 16:21)  T(F): 97.6 (08-12-24 @ 08:00), Max: 98.3 (08-11-24 @ 16:21)  HR: 80 (08-12-24 @ 08:00) (80 - 103)  BP: 144/97 (08-12-24 @ 08:00) (129/91 - 166/106)  BP(mean): --  RR: 20 (08-12-24 @ 06:01) (18 - 20)  SpO2: --

## 2024-08-12 NOTE — BH INPATIENT PSYCHIATRY PROGRESS NOTE - NSBHFUPINTERVALHXFT_PSY_A_CORE
Pt seen and evaluated, chart reviewed. As per nursing report, no acute events overnight, no PRNs. On evaluation, pt presents sitting in bed, greets treatment team appropriately, noted with improved grooming. He reports he is "not good", admits to difficulty distinguishing real vs unreal and states he feels his life in danger but is unable to elaborate further. He reports disrupted sleep and limited appetite from anxious ruminations on paranoid delusions. He denies AH or VH. He denies SI/HI, intent and plan. He endorses motivation to get better and to return to his family. He denies negative side effects of medications.

## 2024-08-12 NOTE — BH INPATIENT PSYCHIATRY PROGRESS NOTE - NSBHMETABOLIC_PSY_ALL_CORE_FT
BMI: BMI (kg/m2): 24 (08-02-24 @ 14:17)  HbA1c: A1C with Estimated Average Glucose Result: 5.6 % (07-05-24 @ 12:37)    Glucose: POCT Blood Glucose.: 82 mg/dL (07-25-24 @ 00:24)    BP: 144/97 (08-12-24 @ 08:00) (106/69 - 166/106)Vital Signs Last 24 Hrs  T(C): 36.4 (08-12-24 @ 08:00), Max: 36.8 (08-11-24 @ 16:21)  T(F): 97.6 (08-12-24 @ 08:00), Max: 98.3 (08-11-24 @ 16:21)  HR: 80 (08-12-24 @ 08:00) (80 - 103)  BP: 144/97 (08-12-24 @ 08:00) (129/91 - 166/106)  BP(mean): --  RR: 20 (08-12-24 @ 06:01) (18 - 20)  SpO2: --      Lipid Panel: Date/Time: 07-05-24 @ 12:37  Cholesterol, Serum: 126  LDL Cholesterol Calculated: 58  HDL Cholesterol, Serum: 52  Total Cholesterol/HDL Ration Measurement: --  Triglycerides, Serum: 77

## 2024-08-12 NOTE — BH INPATIENT PSYCHIATRY PROGRESS NOTE - PRN MEDS
MEDICATIONS  (PRN):  hydrOXYzine hydrochloride 25 milliGRAM(s) Oral every 6 hours PRN anxiety/insomnia  OLANZapine 2.5 milliGRAM(s) Oral every 6 hours PRN agitation  senna 2 Tablet(s) Oral daily PRN constipation

## 2024-08-12 NOTE — BH INPATIENT PSYCHIATRY PROGRESS NOTE - CURRENT MEDICATION
MEDICATIONS  (STANDING):  amLODIPine   Tablet 5 milliGRAM(s) Oral daily  aspirin enteric coated 81 milliGRAM(s) Oral daily  atorvastatin 10 milliGRAM(s) Oral at bedtime  escitalopram 15 milliGRAM(s) Oral daily  OLANZapine 7.5 milliGRAM(s) Oral at bedtime    MEDICATIONS  (PRN):  hydrOXYzine hydrochloride 25 milliGRAM(s) Oral every 6 hours PRN anxiety/insomnia  OLANZapine 2.5 milliGRAM(s) Oral every 6 hours PRN agitation  senna 2 Tablet(s) Oral daily PRN constipation

## 2024-08-13 PROCEDURE — 99232 SBSQ HOSP IP/OBS MODERATE 35: CPT

## 2024-08-13 RX ADMIN — ESCITALOPRAM OXALATE 15 MILLIGRAM(S): 10 TABLET ORAL at 08:10

## 2024-08-13 RX ADMIN — Medication 81 MILLIGRAM(S): at 08:10

## 2024-08-13 RX ADMIN — OLANZAPINE 7.5 MILLIGRAM(S): 7.5 TABLET ORAL at 20:08

## 2024-08-13 RX ADMIN — AMLODIPINE BESYLATE 5 MILLIGRAM(S): 10 TABLET ORAL at 08:10

## 2024-08-13 RX ADMIN — Medication 10 MILLIGRAM(S): at 20:09

## 2024-08-13 NOTE — BH INPATIENT PSYCHIATRY PROGRESS NOTE - NSBHASSESSSUMMFT_PSY_ALL_CORE
60 y/o man, resides with his wife and 2 adult sons, retired supervisor at [employer], with a PMH of HTN and recent chronic lacunar infarct with no residual deficits diagnosed in July 2024, and PPH of recent dx of depression who was admitted to the medical floor for the evaluation of AMS with negative medical workup, and transferred to [facility] for further management of SI and threatening statements made to his wife I/s/o ongoing marital conflicts. Patient had admitted to infidelity to his wife resulting in marital stressors, was diagnosed with depression on 7/15 and placed on Lexapro but had not been taking it. Patient was seen and initially considered not to be a danger to himself or others with the plan to follow up with outpatient psychiatric and psychotherapy services, however the  psychiatry team was recalled because patient was said to be making threatening statements to his wife and again endorsing SI. His worsening mood and anxiety appears more likely in the context of fears of his wife leaving him.     On evaluation, pt continues to present with hernandez affect and more fluid speech, improved eye contact and ADLs. He endorses difficulty distinguishing real vs unreal, admits to continued paranoia that his life is in danger but less so compared to admission. He reports improving sleep and appetite. He denies SI and reports motivation to get better, has been adherent to medications and not a behavioral concern.     #MDD with psychotic fx  #Anxiety  -c/w lexapro 15 mg daily  -c/w zyprexa 7.5 mg qhs  -encourage groups/DBT    #Agitation  -for agitation not amenable to verbal redirection, may give haldol 3 mg q6h prn and/or ativan 1 mg q6h prn prn with escalation to IM if pt is refusing PO and is an acute danger to self or/and others with repeat EKG to ensure QTc <500 ms

## 2024-08-13 NOTE — BH INPATIENT PSYCHIATRY PROGRESS NOTE - NSBHFUPINTERVALHXFT_PSY_A_CORE
Pt seen and evaluated, chart reviewed. As per nursing report, no acute events overnight, no PRNs. On evaluation, pt presents sitting in bed, greets treatment team appropriately and is with improved eye contact. He reports he is "a bit better". He attributes improvement to improved sleep last night, also reports he is motivated to get better so he can return home to be with his family. He admits to continued paranoid delusions that his life is in danger, states he does not know by who or why, reports distress and anxious ruminations on thoughts. He denies AH or VH. He denies SI/HI, intent and plan. He denies negative side effects of medications. Encouraged groups/DBT.  Spoke with pt's wife- updated on POC, states she visited pt yesterday and he appears to be improving.

## 2024-08-13 NOTE — BH INPATIENT PSYCHIATRY PROGRESS NOTE - NSBHMETABOLIC_PSY_ALL_CORE_FT
BMI: BMI (kg/m2): 24 (08-02-24 @ 14:17)  HbA1c: A1C with Estimated Average Glucose Result: 5.6 % (07-05-24 @ 12:37)    Glucose: POCT Blood Glucose.: 82 mg/dL (07-25-24 @ 00:24)    BP: 133/92 (08-13-24 @ 08:45) (106/69 - 166/106)Vital Signs Last 24 Hrs  T(C): 36.4 (08-13-24 @ 08:45), Max: 36.8 (08-12-24 @ 16:13)  T(F): 97.6 (08-13-24 @ 08:45), Max: 98.3 (08-12-24 @ 16:13)  HR: 106 (08-13-24 @ 08:45) (106 - 106)  BP: 133/92 (08-13-24 @ 08:45) (124/84 - 133/92)  BP(mean): --  RR: 18 (08-13-24 @ 08:45) (16 - 18)  SpO2: --      Lipid Panel: Date/Time: 07-05-24 @ 12:37  Cholesterol, Serum: 126  LDL Cholesterol Calculated: 58  HDL Cholesterol, Serum: 52  Total Cholesterol/HDL Ration Measurement: --  Triglycerides, Serum: 77

## 2024-08-13 NOTE — BH INPATIENT PSYCHIATRY PROGRESS NOTE - PRN MEDS
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    Drains: None  Specimens: * No specimens in log *  Findings:   No visible purulence. All prior 10 antibiotic beads removed. Wound irrigated with 4 L NS. No tourniquet required. No visible hemorrhage. Wound was able to be re-closed primarily without any significant tension. Prior small lateral wound remained closed and dry. Prior small open wound just distal to knee had been dressed open and was now dry and covered by an eschar .  Complications: None.  Implants:   Implant Name Type Inv. Item Serial No.  Lot No. LRB No. Used Action   BONE CEMENT SIMPLEX W/TOBRAMYCIN 6197-9-001 - [id number] Cement, Bone BONE CEMENT SIMPLEX W/TOBRAMYCIN 6197-9-001  [employer] [id number] Left 1 Explanted         I spoke with the patient's responsible party, his mother Nory Bishop, immediately postoperatively by phone at [phone]. Findings and plan were discussed. All questions were answered.  Etienne Maier MD  November 15, 2023  9:30 PM   MEDICATIONS  (PRN):  hydrOXYzine hydrochloride 25 milliGRAM(s) Oral every 6 hours PRN anxiety/insomnia  OLANZapine 2.5 milliGRAM(s) Oral every 6 hours PRN agitation  senna 2 Tablet(s) Oral daily PRN constipation

## 2024-08-13 NOTE — BH INPATIENT PSYCHIATRY PROGRESS NOTE - NSBHCHARTREVIEWVS_PSY_A_CORE FT
Vital Signs Last 24 Hrs  T(C): 36.4 (08-13-24 @ 08:45), Max: 36.8 (08-12-24 @ 16:13)  T(F): 97.6 (08-13-24 @ 08:45), Max: 98.3 (08-12-24 @ 16:13)  HR: 106 (08-13-24 @ 08:45) (106 - 106)  BP: 133/92 (08-13-24 @ 08:45) (124/84 - 133/92)  BP(mean): --  RR: 18 (08-13-24 @ 08:45) (16 - 18)  SpO2: --

## 2024-08-14 PROCEDURE — 99232 SBSQ HOSP IP/OBS MODERATE 35: CPT

## 2024-08-14 RX ORDER — OLANZAPINE 7.5 MG/1
10 TABLET ORAL AT BEDTIME
Refills: 0 | Status: DISCONTINUED | OUTPATIENT
Start: 2024-08-14 | End: 2024-08-21

## 2024-08-14 RX ADMIN — ESCITALOPRAM OXALATE 15 MILLIGRAM(S): 10 TABLET ORAL at 08:24

## 2024-08-14 RX ADMIN — AMLODIPINE BESYLATE 5 MILLIGRAM(S): 10 TABLET ORAL at 08:24

## 2024-08-14 RX ADMIN — Medication 10 MILLIGRAM(S): at 20:23

## 2024-08-14 RX ADMIN — OLANZAPINE 10 MILLIGRAM(S): 7.5 TABLET ORAL at 20:23

## 2024-08-14 RX ADMIN — Medication 81 MILLIGRAM(S): at 08:24

## 2024-08-14 NOTE — BH INPATIENT PSYCHIATRY PROGRESS NOTE - NSBHASSESSSUMMFT_PSY_ALL_CORE
58 y/o man, resides with his wife and 2 adult sons, retired supervisor at [employer], with a PMH of HTN and recent chronic lacunar infarct with no residual deficits diagnosed in July 2024, and PPH of recent dx of depression who was admitted to the medical floor for the evaluation of AMS with negative medical workup, and transferred to [facility] for further management of SI and threatening statements made to his wife I/s/o ongoing marital conflicts. Patient had admitted to infidelity to his wife resulting in marital stressors, was diagnosed with depression on 7/15 and placed on Lexapro but had not been taking it. Patient was seen and initially considered not to be a danger to himself or others with the plan to follow up with outpatient psychiatric and psychotherapy services, however the  psychiatry team was recalled because patient was said to be making threatening statements to his wife and again endorsing SI. His worsening mood and anxiety appears more likely in the context of fears of his wife leaving him.     On evaluation, pt continues to present with hernandez affect and more fluid speech, improving eye contact and ADLs. He reports some improvement in ability to distinguish real vs unreal, admits to continued distressing paranoia that his life is in danger but less so compared to admission. He reports improving sleep and appetite. He denies SI and reports motivation to get better, has been adherent to medications and not a behavioral concern.     #MDD with psychotic fx  #Anxiety  -c/w lexapro 15 mg daily  -titrate zyprexa 10 mg qhs  -encourage groups/DBT    #Agitation  -for agitation not amenable to verbal redirection, may give haldol 3 mg q6h prn and/or ativan 1 mg q6h prn prn with escalation to IM if pt is refusing PO and is an acute danger to self or/and others with repeat EKG to ensure QTc <500 ms 58 y/o man, resides with his wife and 2 adult sons, retired supervisor at [employer], with a PMH of HTN and recent chronic lacunar infarct with no residual deficits diagnosed in July 2024, and PPH of recent dx of depression who was admitted to the medical floor for the evaluation of AMS with negative medical workup, and transferred to [facility] for further management of SI and threatening statements made to his wife I/s/o ongoing marital conflicts. Patient had admitted to infidelity to his wife resulting in marital stressors, was diagnosed with depression on 7/15 and placed on Lexapro but had not been taking it. Patient was seen and initially considered not to be a danger to himself or others with the plan to follow up with outpatient psychiatric and psychotherapy services, however the  psychiatry team was recalled because patient was said to be making threatening statements to his wife and again endorsing SI. His worsening mood and anxiety appears more likely in the context of fears of his wife leaving him.     On evaluation, pt continues to present with hernandez affect and more fluid speech, improving eye contact and ADLs. He reports some improvement in ability to distinguish real vs unreal, admits to continued distressing paranoia that his life is in danger but less so compared to admission. He reports improving sleep and appetite. He denies SI and reports motivation to get better, has been adherent to medications and not a behavioral concern. MOCA 25.    #MDD with psychotic fx  #Anxiety  -c/w lexapro 15 mg daily  -titrate zyprexa 10 mg qhs  -encourage groups/DBT    #Agitation  -for agitation not amenable to verbal redirection, may give haldol 3 mg q6h prn and/or ativan 1 mg q6h prn prn with escalation to IM if pt is refusing PO and is an acute danger to self or/and others with repeat EKG to ensure QTc <500 ms

## 2024-08-14 NOTE — BH INPATIENT PSYCHIATRY PROGRESS NOTE - NSBHMETABOLIC_PSY_ALL_CORE_FT
BMI: BMI (kg/m2): 24 (08-02-24 @ 14:17)  HbA1c: A1C with Estimated Average Glucose Result: 5.6 % (07-05-24 @ 12:37)    Glucose: POCT Blood Glucose.: 82 mg/dL (07-25-24 @ 00:24)    BP: 94/69 (08-14-24 @ 06:14) (94/69 - 166/106)Vital Signs Last 24 Hrs  T(C): 36.6 (08-14-24 @ 06:14), Max: 37 (08-13-24 @ 16:00)  T(F): 97.8 (08-14-24 @ 06:14), Max: 98.6 (08-13-24 @ 16:00)  HR: 125 (08-14-24 @ 06:14) (101 - 125)  BP: 94/69 (08-14-24 @ 06:14) (94/69 - 115/81)  BP(mean): --  RR: 17 (08-14-24 @ 06:14) (17 - 17)  SpO2: --      Lipid Panel: Date/Time: 07-05-24 @ 12:37  Cholesterol, Serum: 126  LDL Cholesterol Calculated: 58  HDL Cholesterol, Serum: 52  Total Cholesterol/HDL Ration Measurement: --  Triglycerides, Serum: 77   BMI: BMI (kg/m2): 24 (08-02-24 @ 14:17)  HbA1c: A1C with Estimated Average Glucose Result: 5.6 % (07-05-24 @ 12:37)    Glucose: POCT Blood Glucose.: 82 mg/dL (07-25-24 @ 00:24)    BP: 121/87 (08-14-24 @ 10:31) (94/69 - 166/106)Vital Signs Last 24 Hrs  T(C): 36.6 (08-14-24 @ 10:31), Max: 37 (08-13-24 @ 16:00)  T(F): 97.9 (08-14-24 @ 10:31), Max: 98.6 (08-13-24 @ 16:00)  HR: 82 (08-14-24 @ 10:31) (82 - 125)  BP: 121/87 (08-14-24 @ 10:31) (94/69 - 121/87)  BP(mean): --  RR: 17 (08-14-24 @ 10:31) (17 - 17)  SpO2: --      Lipid Panel: Date/Time: 07-05-24 @ 12:37  Cholesterol, Serum: 126  LDL Cholesterol Calculated: 58  HDL Cholesterol, Serum: 52  Total Cholesterol/HDL Ration Measurement: --  Triglycerides, Serum: 77

## 2024-08-14 NOTE — BH INPATIENT PSYCHIATRY PROGRESS NOTE - NSBHFUPINTERVALHXFT_PSY_A_CORE
Pt seen and evaluated, chart reviewed. As per nursing report, no acute events overnight, no PRNs. On evaluation, pt presents sitting in bed, greets treatment team appropriately and is with improving eye contact and ADLs. He admits to continued paranoia and anxious ruminations that his life is in danger from others, but he is unsure by who or why, states less so from admission but still distressing. He reports resolution of belief that his family is in danger, appears more future-oriented and states he looks forward to returning home to be with his family. He endorses overall improving appetite and sleep. He denies AH or VH. He denies SI/HI, intent and plan. He denies negative side effects of medications. Encouraged groups/DBT. Pt seen and evaluated, chart reviewed. As per nursing report, no acute events overnight, no PRNs. On evaluation, pt presents sitting in bed, greets treatment team appropriately and is with improving eye contact and ADLs. He admits to continued paranoia and anxious ruminations that his life is in danger from others, but he is unsure by who or why, states less so from admission but still distressing. He reports resolution of belief that his family is in danger, appears more future-oriented and states he looks forward to returning home to be with his family. He endorses overall improving appetite and sleep. He denies AH or VH. He denies SI/HI, intent and plan. He denies negative side effects of medications. Encouraged groups/DBT. MOCA 25.

## 2024-08-14 NOTE — BH INPATIENT PSYCHIATRY PROGRESS NOTE - NSBHCHARTREVIEWVS_PSY_A_CORE FT
Vital Signs Last 24 Hrs  T(C): 36.6 (08-14-24 @ 10:31), Max: 37 (08-13-24 @ 16:00)  T(F): 97.9 (08-14-24 @ 10:31), Max: 98.6 (08-13-24 @ 16:00)  HR: 82 (08-14-24 @ 10:31) (82 - 125)  BP: 121/87 (08-14-24 @ 10:31) (94/69 - 121/87)  BP(mean): --  RR: 17 (08-14-24 @ 10:31) (17 - 17)  SpO2: --

## 2024-08-15 PROCEDURE — 99232 SBSQ HOSP IP/OBS MODERATE 35: CPT

## 2024-08-15 RX ADMIN — OLANZAPINE 10 MILLIGRAM(S): 7.5 TABLET ORAL at 20:16

## 2024-08-15 RX ADMIN — ESCITALOPRAM OXALATE 15 MILLIGRAM(S): 10 TABLET ORAL at 08:28

## 2024-08-15 RX ADMIN — Medication 10 MILLIGRAM(S): at 20:15

## 2024-08-15 RX ADMIN — Medication 81 MILLIGRAM(S): at 08:28

## 2024-08-15 RX ADMIN — AMLODIPINE BESYLATE 5 MILLIGRAM(S): 10 TABLET ORAL at 08:25

## 2024-08-15 NOTE — BH INPATIENT PSYCHIATRY PROGRESS NOTE - CURRENT MEDICATION
MEDICATIONS  (STANDING):  amLODIPine   Tablet 5 milliGRAM(s) Oral daily  aspirin enteric coated 81 milliGRAM(s) Oral daily  atorvastatin 10 milliGRAM(s) Oral at bedtime  escitalopram 15 milliGRAM(s) Oral daily  OLANZapine 10 milliGRAM(s) Oral at bedtime    MEDICATIONS  (PRN):  hydrOXYzine hydrochloride 25 milliGRAM(s) Oral every 6 hours PRN anxiety/insomnia  OLANZapine 2.5 milliGRAM(s) Oral every 6 hours PRN agitation  senna 2 Tablet(s) Oral daily PRN constipation

## 2024-08-15 NOTE — BH INPATIENT PSYCHIATRY PROGRESS NOTE - NSBHCHARTREVIEWVS_PSY_A_CORE FT
Vital Signs Last 24 Hrs  T(C): 36.2 (08-14-24 @ 15:56), Max: 36.6 (08-14-24 @ 10:31)  T(F): 97.2 (08-14-24 @ 15:56), Max: 97.9 (08-14-24 @ 10:31)  HR: 102 (08-14-24 @ 15:56) (82 - 102)  BP: 133/86 (08-14-24 @ 15:56) (121/87 - 133/86)  BP(mean): --  RR: 16 (08-14-24 @ 15:56) (16 - 17)  SpO2: --     Vital Signs Last 24 Hrs  T(C): 36.7 (08-15-24 @ 10:46), Max: 36.7 (08-15-24 @ 10:46)  T(F): 98.1 (08-15-24 @ 10:46), Max: 98.1 (08-15-24 @ 10:46)  HR: 101 (08-15-24 @ 10:46) (101 - 102)  BP: 123/83 (08-15-24 @ 10:46) (123/83 - 133/86)  BP(mean): --  RR: 18 (08-15-24 @ 10:46) (16 - 18)  SpO2: --

## 2024-08-15 NOTE — BH INPATIENT PSYCHIATRY PROGRESS NOTE - NSBHASSESSSUMMFT_PSY_ALL_CORE
58 y/o man, resides with his wife and 2 adult sons, retired supervisor at [employer], with a PMH of HTN and recent chronic lacunar infarct with no residual deficits diagnosed in July 2024, and PPH of recent dx of depression who was admitted to the medical floor for the evaluation of AMS with negative medical workup, and transferred to [facility] for further management of SI and threatening statements made to his wife I/s/o ongoing marital conflicts. Patient had admitted to infidelity to his wife resulting in marital stressors, was diagnosed with depression on 7/15 and placed on Lexapro but had not been taking it. Patient was seen and initially considered not to be a danger to himself or others with the plan to follow up with outpatient psychiatric and psychotherapy services, however the  psychiatry team was recalled because patient was said to be making threatening statements to his wife and again endorsing SI. His worsening mood and anxiety appears more likely in the context of fears of his wife leaving him. MOCA 25.     On evaluation, pt continues to present with hernandez affect and more fluid speech, improving eye contact and ADLs. He reports some improvement in ability to distinguish real vs unreal, admits to continued distressing paranoia that his life is in danger but less so compared to admission. He reports improving sleep and appetite. He denies SI and reports motivation to get better, has been adherent to medications and not a behavioral concern.    #MDD with psychotic fx  #Anxiety  -c/w lexapro 15 mg daily  -titrate zyprexa 10 mg qhs  -encourage groups/DBT    #Agitation  -for agitation not amenable to verbal redirection, may give haldol 3 mg q6h prn and/or ativan 1 mg q6h prn prn with escalation to IM if pt is refusing PO and is an acute danger to self or/and others with repeat EKG to ensure QTc <500 ms 60 y/o man, resides with his wife and 2 adult sons, retired supervisor at [employer], with a PMH of HTN and recent chronic lacunar infarct with no residual deficits diagnosed in July 2024, and PPH of recent dx of depression who was admitted to the medical floor for the evaluation of AMS with negative medical workup, and transferred to [facility] for further management of SI and threatening statements made to his wife I/s/o ongoing marital conflicts. Patient had admitted to infidelity to his wife resulting in marital stressors, was diagnosed with depression on 7/15 and placed on Lexapro but had not been taking it. Patient was seen and initially considered not to be a danger to himself or others with the plan to follow up with outpatient psychiatric and psychotherapy services, however the  psychiatry team was recalled because patient was said to be making threatening statements to his wife and again endorsing SI. His worsening mood and anxiety appears more likely in the context of fears of his wife leaving him. MOCA 25.     On evaluation, pt continues to present with hernandez affect and more fluid speech, improving eye contact and ADLs. He reports some improvement in ability to distinguish real vs unreal, reports paranoia is less so compared to admission. He reports improving sleep and appetite. He denies SI and reports motivation to get better, has been adherent to medications and not a behavioral concern. Encouraged groups/DBT.    #MDD with psychotic fx  #Anxiety  -c/w lexapro 15 mg daily  -c/w zyprexa 10 mg qhs (8/14)  -encourage groups/DBT    #Agitation  -for agitation not amenable to verbal redirection, may give haldol 3 mg q6h prn and/or ativan 1 mg q6h prn prn with escalation to IM if pt is refusing PO and is an acute danger to self or/and others with repeat EKG to ensure QTc <500 ms

## 2024-08-15 NOTE — BH INPATIENT PSYCHIATRY PROGRESS NOTE - NSBHFUPINTERVALHXFT_PSY_A_CORE
Pt seen and evaluated, chart reviewed. As per nursing report, no acute events overnight, no PRNs. On evaluation, pt presents sitting in bed, greets treatment team appropriately and is with improving eye contact and ADLs. He reports he is "ok", states he was able to fall asleep more easily last night and is with improving appetite. He reports some improvement in anxious ruminations and paranoia, Pt seen and evaluated, chart reviewed. As per nursing report, no acute events overnight, no PRNs. On evaluation, pt presents sitting in bed, greets treatment team appropriately and is with improving eye contact and ADLs. He reports he is "ok", states he was able to fall asleep more easily last night and is with improving appetite. He reports some improvement in anxious ruminations and paranoia, agrees to attend more groups/DBT today. He denies SI/HI, intent and plan. He denies negative side effects of medications. Pt has not been a behavioral concern.

## 2024-08-15 NOTE — BH INPATIENT PSYCHIATRY PROGRESS NOTE - NSBHMETABOLIC_PSY_ALL_CORE_FT
BMI: BMI (kg/m2): 24 (08-02-24 @ 14:17)  HbA1c: A1C with Estimated Average Glucose Result: 5.6 % (07-05-24 @ 12:37)    Glucose: POCT Blood Glucose.: 82 mg/dL (07-25-24 @ 00:24)    BP: 133/86 (08-14-24 @ 15:56) (94/69 - 133/92)Vital Signs Last 24 Hrs  T(C): 36.2 (08-14-24 @ 15:56), Max: 36.6 (08-14-24 @ 10:31)  T(F): 97.2 (08-14-24 @ 15:56), Max: 97.9 (08-14-24 @ 10:31)  HR: 102 (08-14-24 @ 15:56) (82 - 102)  BP: 133/86 (08-14-24 @ 15:56) (121/87 - 133/86)  BP(mean): --  RR: 16 (08-14-24 @ 15:56) (16 - 17)  SpO2: --      Lipid Panel: Date/Time: 07-05-24 @ 12:37  Cholesterol, Serum: 126  LDL Cholesterol Calculated: 58  HDL Cholesterol, Serum: 52  Total Cholesterol/HDL Ration Measurement: --  Triglycerides, Serum: 77   BMI: BMI (kg/m2): 24 (08-02-24 @ 14:17)  HbA1c: A1C with Estimated Average Glucose Result: 5.6 % (07-05-24 @ 12:37)    Glucose: POCT Blood Glucose.: 82 mg/dL (07-25-24 @ 00:24)    BP: 123/83 (08-15-24 @ 10:46) (94/69 - 133/92)Vital Signs Last 24 Hrs  T(C): 36.7 (08-15-24 @ 10:46), Max: 36.7 (08-15-24 @ 10:46)  T(F): 98.1 (08-15-24 @ 10:46), Max: 98.1 (08-15-24 @ 10:46)  HR: 101 (08-15-24 @ 10:46) (101 - 102)  BP: 123/83 (08-15-24 @ 10:46) (123/83 - 133/86)  BP(mean): --  RR: 18 (08-15-24 @ 10:46) (16 - 18)  SpO2: --      Lipid Panel: Date/Time: 07-05-24 @ 12:37  Cholesterol, Serum: 126  LDL Cholesterol Calculated: 58  HDL Cholesterol, Serum: 52  Total Cholesterol/HDL Ration Measurement: --  Triglycerides, Serum: 77

## 2024-08-16 PROCEDURE — 99232 SBSQ HOSP IP/OBS MODERATE 35: CPT

## 2024-08-16 RX ORDER — ESCITALOPRAM OXALATE 10 MG/1
20 TABLET ORAL DAILY
Refills: 0 | Status: DISCONTINUED | OUTPATIENT
Start: 2024-08-17 | End: 2024-08-21

## 2024-08-16 RX ADMIN — ESCITALOPRAM OXALATE 15 MILLIGRAM(S): 10 TABLET ORAL at 08:06

## 2024-08-16 RX ADMIN — AMLODIPINE BESYLATE 5 MILLIGRAM(S): 10 TABLET ORAL at 08:06

## 2024-08-16 RX ADMIN — Medication 81 MILLIGRAM(S): at 08:06

## 2024-08-16 RX ADMIN — OLANZAPINE 10 MILLIGRAM(S): 7.5 TABLET ORAL at 20:43

## 2024-08-16 RX ADMIN — Medication 10 MILLIGRAM(S): at 20:43

## 2024-08-16 NOTE — BH TREATMENT PLAN - NSTXPLANTHERAPYSESSIONSFT_PSY_ALL_CORE
08-05-24  Type of therapy: Coping skills, Creative arts therapy, Inspiration and motiviation, Leisure development, Self esteem, Social skills training, Stress management, Symptom management  Type of session: Individual  Level of patient participation: Resistance to participation  Duration of participation: Less than 15 minutes  Therapy conducted by: Psych rehab  Therapy Summary: Pt will need increased encouragement to attend group RT sessions.  
  08-12-24  Type of therapy: Dialectical behavior therapy, Coping skills  Type of session: Group  Level of patient participation: Attentive, Participated with encouragement  Duration of participation: 45 minutes  Therapy conducted by: Social work  Therapy Summary: Patient attended the Crisis Skills Group with  and peers. The TIPP Skill was reviewed which identifies ways to help us cope with extreme emotions (by “tipping” our body chemistry). The following skills were reviewed: “Temperature, Intense Exercise, Paced Breathing, and Progressive Muscle Relaxation”.  Patients offered support and feedback while  facilitated the group.    Michael was an active listener. He appeared open to reviewing the materials presented. He feels he can take walks and listen to music as healthy ways to cope with stressors. He remained in good behavioral control for the groups duration.    08-12-24  Type of therapy: Coping skills, Psychoeducation  Type of session: Group  Level of patient participation: Attentive, Participated with encouragement  Duration of participation: 45 minutes  Therapy conducted by: Social work  Therapy Summary: Patient attended the Patient Support group with  and peers. The “Positive Steps To Wellbeing” worksheet was reviewed which identifies exercises to increase well-being. The tips that were described include being kind to yourself, exercising regularly, taking up a hobby, being creative, helping others, relaxing, eating healthy, balancing sleep, connecting with others, avoiding drugs, seeing the bigger picture, and learning to accept things as they are. Patients were asked to consider which tips/ steps they would like to personally work on to improve their own well-being.  facilitated the group and patients provided feedback and support.    08-14-24  Type of therapy: Coping skills  Type of session: Group  Level of patient participation: Attentive, Participated with encouragement  Duration of participation: 45 minutes  Therapy conducted by: Social work  Therapy Summary: Patient attended the Patient Support Group with  and peers. The topic of “Building Happiness” was explored and exercises were reviewed known to encourage lasting happiness such as positive journaling, exercise, acts of kindness, meditation, gratitude’s and fostering relationships. Patients provided support and feedback while  facilitated the discussion.    Patient participated in the dialogue. He identified his family as a protective factor.    08-16-24  Type of therapy: Coping skills, Health and fitness, Inspiration and motiviation, Leisure development, Self esteem, Social skills training, Stress management, Symptom management  Type of session: Group  Level of patient participation: Engaged, Participated with encouragement  Duration of participation: 30 minutes  Therapy conducted by: Psych rehab  Therapy Summary: Pt is attending select RT sessions , pt is calm cooperative , quiet . Pt will also engage in reading during leisue time .

## 2024-08-16 NOTE — BH TREATMENT PLAN - NSCMSPTSTRENGTHS_PSY_ALL_CORE
Compliance to treatment/Rupa/spirituality/Strong support system/Supportive family
unable to assess/Motivated/Supportive family

## 2024-08-16 NOTE — BH INPATIENT PSYCHIATRY PROGRESS NOTE - NSBHASSESSSUMMFT_PSY_ALL_CORE
58 y/o man, resides with his wife and 2 adult sons, retired supervisor at [employer], with a PMH of HTN and recent chronic lacunar infarct with no residual deficits diagnosed in July 2024, and PPH of recent dx of depression who was admitted to the medical floor for the evaluation of AMS with negative medical workup, and transferred to [facility] for further management of SI and threatening statements made to his wife I/s/o ongoing marital conflicts. Patient had admitted to infidelity to his wife resulting in marital stressors, was diagnosed with depression on 7/15 and placed on Lexapro but had not been taking it. Patient was seen and initially considered not to be a danger to himself or others with the plan to follow up with outpatient psychiatric and psychotherapy services, however the  psychiatry team was recalled because patient was said to be making threatening statements to his wife and again endorsing SI. His worsening mood and anxiety appears more likely in the context of fears of his wife leaving him. MOCA 25.     On evaluation, pt continues to present with hernandez affect and more fluid speech, improving eye contact and ADLs. Today he endorses significant guilt and anxiety in regards to returning home, perseverates on not deserving good things. He reports paranoia is less so compared to admission. He reports improving sleep and appetite. He denies active SI, has been adherent to medications and not a behavioral concern. Encouraged groups/DBT.    #MDD with psychotic fx  #Anxiety  -titrate lexapro 20 mg daily  -c/w zyprexa 10 mg qhs (8/14)  -encourage groups/DBT    #Agitation  -for agitation not amenable to verbal redirection, may give haldol 3 mg q6h prn and/or ativan 1 mg q6h prn prn with escalation to IM if pt is refusing PO and is an acute danger to self or/and others with repeat EKG to ensure QTc <500 ms

## 2024-08-16 NOTE — BH INPATIENT PSYCHIATRY PROGRESS NOTE - NSBHFUPINTERVALHXFT_PSY_A_CORE
Pt seen and evaluated, chart reviewed. As per nursing report, no acute events overnight, no PRNs. On evaluation, pt presents sitting in bed, greets treatment team appropriately and is with improving eye contact and ADLs. He states he has been anxiously ruminating on his situation. He reports he wants to go home, but is not ready. He admits to worry that his wife is going to leave him, states he does not deserve to have good things happen to him. He endorses guilt and feelings of hopelessness. He denies SI/HI, intent and plan. He denies AVH, denies others are trying to harm/follow him. He negative side effects of medications. Pt has been isolative to room, encouraged groups/DBT.  Pt seen and evaluated, chart reviewed. As per nursing report, no acute events overnight, no PRNs. On evaluation, pt presents sitting in bed, greets treatment team appropriately and is with improving eye contact and ADLs. He states he has been anxiously ruminating on his situation. He reports he wants to go home, but is not ready. He admits to worry that his wife is going to leave him, states he does not deserve to have good things happen to him. He endorses guilt and feelings of hopelessness. He denies SI/HI, intent and plan. He denies AVH, denies others are trying to harm/follow him. He negative side effects of medications. Pt has been isolative to room, encouraged groups/DBT.   Spoke with pt's wife- updated on POC, reports pt appears to be improving.

## 2024-08-16 NOTE — BH INPATIENT PSYCHIATRY PROGRESS NOTE - CURRENT MEDICATION
MEDICATIONS  (STANDING):  amLODIPine   Tablet 5 milliGRAM(s) Oral daily  aspirin enteric coated 81 milliGRAM(s) Oral daily  atorvastatin 10 milliGRAM(s) Oral at bedtime  OLANZapine 10 milliGRAM(s) Oral at bedtime    MEDICATIONS  (PRN):  hydrOXYzine hydrochloride 25 milliGRAM(s) Oral every 6 hours PRN anxiety/insomnia  OLANZapine 2.5 milliGRAM(s) Oral every 6 hours PRN agitation  senna 2 Tablet(s) Oral daily PRN constipation

## 2024-08-16 NOTE — BH TREATMENT PLAN - NSTXSUICIDINTERMD_PSY_ALL_CORE
Medications management, encourage groups/DBT, suicide precautions, safety plan
Medications management, encourage groups/DBT, suicide precautions, safety plan

## 2024-08-16 NOTE — BH TREATMENT PLAN - NSTXDEPRESINTERPR_PSY_ALL_CORE
Psych rehab will offer support and encouragement to attend RT sessions
NC will continue to offer support and ongoing encouragement to attend RT sessions .

## 2024-08-16 NOTE — BH TREATMENT PLAN - NSBHPRIMARYDX_PSY_ALL_CORE
Major depressive disorder with psychotic features    
Major depressive disorder with psychotic features

## 2024-08-16 NOTE — BH INPATIENT PSYCHIATRY PROGRESS NOTE - NSBHCHARTREVIEWVS_PSY_A_CORE FT
Vital Signs Last 24 Hrs  T(C): 36.6 (08-16-24 @ 08:47), Max: 36.6 (08-16-24 @ 08:47)  T(F): 97.9 (08-16-24 @ 08:47), Max: 97.9 (08-16-24 @ 08:47)  HR: 99 (08-16-24 @ 08:47) (68 - 99)  BP: 145/98 (08-16-24 @ 08:47) (145/98 - 146/92)  BP(mean): --  RR: 18 (08-16-24 @ 08:47) (18 - 18)  SpO2: --

## 2024-08-16 NOTE — BH TREATMENT PLAN - NSTXDCOPLKINTERSW_PSY_ALL_CORE
will meet with Michael (along with treatment team) daily to provide education and support.    will collaborate with Michael as part of the treatment team, to determine and refer to appropriate level of care in the community.    will encourage Michael to attend groups on the unit for psychoeducation.    will encourage outpatient follow up for improvement in mental health.
 will meet with Michael (along with treatment team) daily to provide education and support.    will collaborate with Michael as part of the treatment team, to determine and refer to appropriate level of care in the community.    will encourage Michael to attend groups on the unit for psychoeducation.    will encourage outpatient follow up for improvement in mental health.

## 2024-08-16 NOTE — BH INPATIENT PSYCHIATRY PROGRESS NOTE - NSBHMETABOLIC_PSY_ALL_CORE_FT
BMI: BMI (kg/m2): 24 (08-02-24 @ 14:17)  HbA1c: A1C with Estimated Average Glucose Result: 5.6 % (07-05-24 @ 12:37)    Glucose: POCT Blood Glucose.: 82 mg/dL (07-25-24 @ 00:24)    BP: 145/98 (08-16-24 @ 08:47) (94/69 - 146/92)Vital Signs Last 24 Hrs  T(C): 36.6 (08-16-24 @ 08:47), Max: 36.6 (08-16-24 @ 08:47)  T(F): 97.9 (08-16-24 @ 08:47), Max: 97.9 (08-16-24 @ 08:47)  HR: 99 (08-16-24 @ 08:47) (68 - 99)  BP: 145/98 (08-16-24 @ 08:47) (145/98 - 146/92)  BP(mean): --  RR: 18 (08-16-24 @ 08:47) (18 - 18)  SpO2: --      Lipid Panel: Date/Time: 07-05-24 @ 12:37  Cholesterol, Serum: 126  LDL Cholesterol Calculated: 58  HDL Cholesterol, Serum: 52  Total Cholesterol/HDL Ration Measurement: --  Triglycerides, Serum: 77

## 2024-08-17 RX ADMIN — AMLODIPINE BESYLATE 5 MILLIGRAM(S): 10 TABLET ORAL at 08:51

## 2024-08-17 RX ADMIN — Medication 81 MILLIGRAM(S): at 08:51

## 2024-08-17 RX ADMIN — ESCITALOPRAM OXALATE 20 MILLIGRAM(S): 10 TABLET ORAL at 08:50

## 2024-08-17 RX ADMIN — Medication 10 MILLIGRAM(S): at 20:49

## 2024-08-17 RX ADMIN — OLANZAPINE 10 MILLIGRAM(S): 7.5 TABLET ORAL at 20:49

## 2024-08-18 RX ADMIN — ESCITALOPRAM OXALATE 20 MILLIGRAM(S): 10 TABLET ORAL at 09:32

## 2024-08-18 RX ADMIN — Medication 10 MILLIGRAM(S): at 20:42

## 2024-08-18 RX ADMIN — Medication 81 MILLIGRAM(S): at 09:31

## 2024-08-18 RX ADMIN — AMLODIPINE BESYLATE 5 MILLIGRAM(S): 10 TABLET ORAL at 09:32

## 2024-08-18 RX ADMIN — OLANZAPINE 10 MILLIGRAM(S): 7.5 TABLET ORAL at 20:42

## 2024-08-19 PROCEDURE — 99232 SBSQ HOSP IP/OBS MODERATE 35: CPT

## 2024-08-19 RX ORDER — CLONIDINE HCL 0.2 MG
0.1 TABLET ORAL ONCE
Refills: 0 | Status: COMPLETED | OUTPATIENT
Start: 2024-08-19 | End: 2024-08-19

## 2024-08-19 RX ADMIN — Medication 10 MILLIGRAM(S): at 20:41

## 2024-08-19 RX ADMIN — ESCITALOPRAM OXALATE 20 MILLIGRAM(S): 10 TABLET ORAL at 08:08

## 2024-08-19 RX ADMIN — OLANZAPINE 10 MILLIGRAM(S): 7.5 TABLET ORAL at 20:41

## 2024-08-19 RX ADMIN — Medication 81 MILLIGRAM(S): at 08:08

## 2024-08-19 RX ADMIN — AMLODIPINE BESYLATE 5 MILLIGRAM(S): 10 TABLET ORAL at 08:08

## 2024-08-19 NOTE — BH INPATIENT PSYCHIATRY DISCHARGE NOTE - NSBHDCMEDICALFT_PSY_A_CORE
#AMS- resolved, no metabolic , no cardiac, no neurological , no infections cause   hx of lacunar infarct -MRI head with chronic microvascular changes, continue asa, statins tx,   -EEG normal  -B12, folate, TSH normal, RPR and HIV levels neg    # HTN - well controlled on daily Norvasc tx.     # mild elevated LFTs - close outpatient f/up  #AMS- resolved, no metabolic , no cardiac, no neurological , no infections cause   hx of lacunar infarct -MRI head with chronic microvascular changes, continue asa, statins tx,   -EEG normal  -B12, folate, TSH normal, RPR and HIV levels neg    # HTN - well controlled on daily Norvasc tx.     # mild elevated LFTs - close outpatient f/up     #Poor PO intake - RD consulted, start Ensure supplementation

## 2024-08-19 NOTE — BH INPATIENT PSYCHIATRY PROGRESS NOTE - NSBHMETABOLIC_PSY_ALL_CORE_FT
BMI: BMI (kg/m2): 24 (08-02-24 @ 14:17)  HbA1c: A1C with Estimated Average Glucose Result: 5.6 % (07-05-24 @ 12:37)    Glucose: POCT Blood Glucose.: 82 mg/dL (07-25-24 @ 00:24)    BP: 136/94 (08-19-24 @ 08:44) (118/83 - 136/94)Vital Signs Last 24 Hrs  T(C): 36.7 (08-19-24 @ 08:44), Max: 36.7 (08-18-24 @ 16:00)  T(F): 98.1 (08-19-24 @ 08:44), Max: 98.1 (08-18-24 @ 16:00)  HR: 105 (08-19-24 @ 08:44) (105 - 111)  BP: 136/94 (08-19-24 @ 08:44) (121/84 - 136/94)  BP(mean): --  RR: 18 (08-19-24 @ 08:44) (18 - 18)  SpO2: --      Lipid Panel: Date/Time: 07-05-24 @ 12:37  Cholesterol, Serum: 126  LDL Cholesterol Calculated: 58  HDL Cholesterol, Serum: 52  Total Cholesterol/HDL Ration Measurement: --  Triglycerides, Serum: 77

## 2024-08-19 NOTE — BH INPATIENT PSYCHIATRY DISCHARGE NOTE - NSDCCPCAREPLAN_GEN_ALL_CORE_FT
PRINCIPAL DISCHARGE DIAGNOSIS  Diagnosis: Major depressive disorder with psychotic features  Assessment and Plan of Treatment: Continue current medication regimen and follow up with aftercare appointments      SECONDARY DISCHARGE DIAGNOSES  Diagnosis: Anxiety  Assessment and Plan of Treatment: Continue current medication regimen and follow up with aftercare appointments
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
## 2024-08-19 NOTE — BH DISCHARGE NOTE NURSING/SOCIAL WORK/PSYCH REHAB - NSCDUDCCRISIS_PSY_A_CORE
[facility] Well  1 ([phone]) [facility] ([phone])  Text "WELL" to 71069  Website: www.[employer]/.National Suicide Prevention Lifeline 6 (794) 077-9088/.  [employer]  1 (447) LIFENET (395-8373)/988 Suicide and Crisis Lifeline

## 2024-08-19 NOTE — BH INPATIENT PSYCHIATRY DISCHARGE NOTE - NSBHASSESSSUMMFT_PSY_ALL_CORE
58 y/o man, resides with his wife and 2 adult sons, retired supervisor at [employer], with a PMH of HTN and recent chronic lacunar infarct with no residual deficits diagnosed in July 2024, and PPH of recent dx of depression who was admitted to the medical floor for the evaluation of AMS with negative medical workup, and transferred to [facility] for further management of SI and threatening statements made to his wife I/s/o ongoing marital conflicts. Patient had admitted to infidelity to his wife resulting in marital stressors, was diagnosed with depression on 7/15 and placed on Lexapro but had not been taking it. Patient was seen and initially considered not to be a danger to himself or others with the plan to follow up with outpatient psychiatric and psychotherapy services, however the  psychiatry team was recalled because patient was said to be making threatening statements to his wife and again endorsing SI. His worsening mood and anxiety appears more likely in the context of fears of his wife leaving him. MOCA 25.     On evaluation, pt continues to present with hernandez affect and more fluid speech, good eye contact and ADLs. He reports feeling better with overall improving anxiety, sleep and appetite since admission. He reports resolution of paranoia and states he feels safe to return home. He denies AH or VH, does not appear RIS. He denies SI/HI, is future-oriented and agreeable to IOP f/u. He has been adherent to medications and not a behavioral concern. Anticipated discharge for tomorrow.    #MDD with psychotic fx  #Anxiety  -c/w lexapro 20 mg daily  -c/w zyprexa 10 mg qhs   -encourage groups/DBT    #Agitation  -for agitation not amenable to verbal redirection, may give haldol 3 mg q6h prn and/or ativan 1 mg q6h prn prn with escalation to IM if pt is refusing PO and is an acute danger to self or/and others with repeat EKG to ensure QTc <500 ms 60 y/o man, resides with his wife and 2 adult sons, retired supervisor at [employer], with a PMH of HTN and recent chronic lacunar infarct with no residual deficits diagnosed in July 2024, and PPH of recent dx of depression who was admitted to the medical floor for the evaluation of AMS with negative medical workup, and transferred to [facility] for further management of SI and threatening statements made to his wife I/s/o ongoing marital conflicts. Patient had admitted to infidelity to his wife resulting in marital stressors, was diagnosed with depression on 7/15 and placed on Lexapro but had not been taking it. Patient was seen and initially considered not to be a danger to himself or others with the plan to follow up with outpatient psychiatric and psychotherapy services, however the  psychiatry team was recalled because patient was said to be making threatening statements to his wife and again endorsing SI. His worsening mood and anxiety appears more likely in the context of fears of his wife leaving him. MOCA 25.     On evaluation, pt continues to present with hernandez affect and more fluid speech, good eye contact and ADLs. He reports feeling better with overall improving mood, anxiety, sleep and appetite since admission. He reports resolution of paranoia and states he feels safe to return home. He denies AH or VH, does not appear RIS. He denies SI/HI, is future-oriented and agreeable to IOP f/u. He has been adherent to medications and not a behavioral concern. Discharge today.    #MDD with psychotic fx  #Anxiety  -c/w lexapro 20 mg daily  -c/w zyprexa 10 mg qhs   -encourage groups/DBT    #Agitation  -for agitation not amenable to verbal redirection, may give haldol 3 mg q6h prn and/or ativan 1 mg q6h prn prn with escalation to IM if pt is refusing PO and is an acute danger to self or/and others with repeat EKG to ensure QTc <500 ms 58 y/o man, resides with his wife and 2 adult sons, retired supervisor at [employer], with a PMH of HTN and recent chronic lacunar infarct with no residual deficits diagnosed in July 2024, and PPH of recent dx of depression who was admitted to the medical floor for the evaluation of AMS with negative medical workup, and transferred to [facility] for further management of SI and threatening statements made to his wife I/s/o ongoing marital conflicts. Patient had admitted to infidelity to his wife resulting in marital stressors, was diagnosed with depression on 7/15 and placed on Lexapro but had not been taking it. Patient was seen and initially considered not to be a danger to himself or others with the plan to follow up with outpatient psychiatric and psychotherapy services, however the  psychiatry team was recalled because patient was said to be making threatening statements to his wife and again endorsing SI. His worsening mood and anxiety appears more likely in the context of fears of his wife leaving him. MOCA 25.     On evaluation, pt continues to present with hernandez affect and more fluid speech, good eye contact and ADLs. He reports feeling better with overall improving mood, anxiety, sleep and appetite since admission. He reports resolution of paranoia and states he feels safe to return home. He denies AH or VH, does not appear RIS. He denies SI/HI, is future-oriented and agreeable to IOP f/u. He has been adherent to medications and reports some daytime drowsiness today, denies other negative side effects. Pt has not been a behavioral concern. Discharge today.    #MDD with psychotic fx  #Anxiety  -c/w lexapro 20 mg daily  -c/w zyprexa 10 mg qhs   -encourage groups/DBT    #Agitation  -for agitation not amenable to verbal redirection, may give haldol 3 mg q6h prn and/or ativan 1 mg q6h prn prn with escalation to IM if pt is refusing PO and is an acute danger to self or/and others with repeat EKG to ensure QTc <500 ms

## 2024-08-19 NOTE — BH INPATIENT PSYCHIATRY PROGRESS NOTE - NSBHFUPINTERVALHXFT_PSY_A_CORE
Pt seen and evaluated, chart reviewed. As per nursing report, no acute events overnight, no PRNs. On evaluation, pt presents sitting in bed, greets treatment team with a smile. Pt with improving eye contact and ADLs. He endorses overall improving mood, sleep and appetite since admission. He admits to anxious ruminations on ability to continue to do well on discharge, reports looking forward to being with family and agrees to IOP. He denies SI/HI, intent and plan. He denies AH of VH; of note, staff reports pt endorsed AH over the weekend, pt denies stating it was his own thoughts. He denies others are trying to harm/follow him. He negative side effects of medications. Pt has been more visible in groups/DBT.  Pt seen and evaluated, chart reviewed. As per nursing report, no acute events overnight, no PRNs. On evaluation, pt presents sitting in bed, greets treatment team with a smile. Pt with improving eye contact and ADLs. He endorses overall improving mood, sleep and appetite since admission. He admits to anxious ruminations on ability to continue to do well on discharge, reports looking forward to being with family and agrees to IOP. He denies SI/HI, intent and plan. He denies AH of VH; of note, staff reports pt endorsed AH over the weekend, pt denies stating it was his own thoughts. He denies others are trying to harm/follow him. He negative side effects of medications. Pt has been more visible in groups/DBT.   Spoke with pt's wife- updated on POC, reports pt appears much improved and denies safety concerns on discharge planning.

## 2024-08-19 NOTE — BH INPATIENT PSYCHIATRY DISCHARGE NOTE - NSDCMRMEDTOKEN_GEN_ALL_CORE_FT
aspirin 81 mg oral tablet: 1 tab(s) orally once a day  Aspirin Low Dose 81 mg oral delayed release tablet: 1 tab(s) orally once a day  atorvastatin 10 mg oral tablet: 1 tab(s) orally once a day (at bedtime)  escitalopram 10 mg oral tablet: 1 tab(s) orally once a day  Norvasc 5 mg oral tablet: 1 tab(s) orally once a day   amLODIPine 5 mg oral tablet: 1 tab(s) orally once a day x 14 days Continue to take as prescribed until told otherwise by your provider  aspirin 81 mg oral delayed release tablet: 1 tab(s) orally once a day x 14 days Continue to take as prescribed until told otherwise by your provider  atorvastatin 10 mg oral tablet: 1 tab(s) orally once a day (at bedtime) x 14 days Continue to take as prescribed until told otherwise by your provider  escitalopram 20 mg oral tablet: 1 tab(s) orally once a day x 14 days Continue to take as prescribed until told otherwise by your provider  OLANZapine 10 mg oral tablet: 1 tab(s) orally once a day (at bedtime) x 14 days Continue to take as prescribed until told otherwise by your provider
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
## 2024-08-19 NOTE — BH DISCHARGE NOTE NURSING/SOCIAL WORK/PSYCH REHAB - PATIENT PORTAL LINK FT
You can access the FollowMyHealth Patient Portal offered by [facility] by registering at the following website: [email or web address]. By joining [employer]’s FollowMyHealth portal, you will also be able to view your health information using other applications (apps) compatible with our system.
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
## 2024-08-19 NOTE — BH INPATIENT PSYCHIATRY DISCHARGE NOTE - NSDCRECOMMENDLABFT_PSY_ALL_CORE
Continue to monitor EKG, weight, BP, CBC, CMP, Hemoglobin A1c, fasting glucose and Lipid profile. Continue to monitor Hepatic Function Panel, EKG, weight, BP, CBC, CMP, Hemoglobin A1c, fasting glucose and Lipid profile.

## 2024-08-19 NOTE — BH DISCHARGE NOTE NURSING/SOCIAL WORK/PSYCH REHAB - NSDCADDINFO1FT_PSY_ALL_CORE
THIS IS AN IN-PERSON APPOINTMENT. PLEASE ARRIVE 30 MINUTES PRIOR TO YOUR APPOINTMENT*YOU MUST ATTEND THE INITIAL INTAKE APPOINTMENT IN ORDER TO SEE YOUR PSYCHIATRIST. A MISSED INTAKE WILL CANCEL PSYCHIATRIST'S APPT AUTOMATICALLY! *  THIS IS AN IN-PERSON APPOINTMENT. PLEASE ARRIVE 30 MINUTES PRIOR TO YOUR APPOINTMENT*YOU MUST ATTEND THE INITIAL INTAKE APPOINTMENT IN ORDER TO SEE YOUR PSYCHIATRIST. A MISSED INTAKE WILL CANCEL PSYCHIATRIST'S APPT AUTOMATICALLY! *

## 2024-08-19 NOTE — BH INPATIENT PSYCHIATRY PROGRESS NOTE - NSBHCHARTREVIEWVS_PSY_A_CORE FT
Vital Signs Last 24 Hrs  T(C): 36.7 (08-19-24 @ 08:44), Max: 36.7 (08-18-24 @ 16:00)  T(F): 98.1 (08-19-24 @ 08:44), Max: 98.1 (08-18-24 @ 16:00)  HR: 105 (08-19-24 @ 08:44) (105 - 111)  BP: 136/94 (08-19-24 @ 08:44) (121/84 - 136/94)  BP(mean): --  RR: 18 (08-19-24 @ 08:44) (18 - 18)  SpO2: --

## 2024-08-19 NOTE — BH DISCHARGE NOTE NURSING/SOCIAL WORK/PSYCH REHAB - NSBHDCAGENCY1FT_PSY_A_CORE
[facility]  [facility]   [facility] IOP  [facility]  [facility]

## 2024-08-19 NOTE — BH INPATIENT PSYCHIATRY DISCHARGE NOTE - HOSPITAL COURSE
Pt has made good progress over the course of hospitalization. With continuous psychotherapy from the treatment team and the medications, he reports feeling better with decrease in anxiety episodes, and overall improving sleep and appetite. He also endorsed decrease in paranoid delusions, states he feels safe to return home. Medications adjusted as follows- titrated lexapro 20 mg daily for improved mood sx, and started zyprexa for paranoia. Pt tolerated medications well, denied negative side effects. Thought process and insight improved. Pt was cooperative and was in good behavioral control. Denied any suicidal or homicidal ideations. Denied any auditory or visual hallucinations. Pt with good ADLs. Pt able to vocalize and utilize prosocial behaviors to address needs, and engage appropriately with staff and group milieu. Abnormalities in mental status improved sufficiently to permit outgoing care in the outpatient setting. Pt was evaluated by treatment team, pt is stable for discharge and shows no imminent danger to self, others or property at this time. He understands and agrees with treatment plan and following up with outpatient. Psychoeducation provided regarding diagnosis, medications, treatment and follow up. Risks, benefits, alternatives discussed, all questions and concerns addressed and answered. Reviewed crisis intervention with verbalized understanding.

## 2024-08-19 NOTE — BH INPATIENT PSYCHIATRY DISCHARGE NOTE - NSBHFUPINTERVALHXFT_PSY_A_CORE
Pt seen and evaluated, chart reviewed. As per nursing report, no acute events overnight, no PRNs. On evaluation, pt presents well-groomed and cooperative, reports he is doing better since admission and ready for discharge today. He endorses overall improved mood and decrease in anxiety episodes since admission. He endorses improved sleep and appetite. He denies AH or VH. He denies paranoia. He denies SI/HI, intent and plan. Pt is future-oriented and agreeable to OP f/u. Pt has not been a behavioral concern. Discharge today. Pt and pt's wife verbalizes understanding to discharge instructions.  Pt seen and evaluated, chart reviewed. As per nursing report, no acute events overnight, no PRNs. On evaluation, pt presents well-groomed and cooperative, reports he is doing better since admission and ready for discharge today. He endorses overall improved mood and decrease in anxiety episodes since admission. He endorses improved sleep and appetite. He denies AH or VH. He denies paranoia. He denies SI/HI, intent and plan. Pt is future-oriented and agreeable to IOP f/u. He is adherent to medications, reports some daytime drowsiness today, denies other negative side effects. Pt has not been a behavioral concern. Discharge today. Pt and pt's wife verbalizes understanding to discharge instructions.

## 2024-08-19 NOTE — BH INPATIENT PSYCHIATRY PROGRESS NOTE - CURRENT MEDICATION
MEDICATIONS  (STANDING):  amLODIPine   Tablet 5 milliGRAM(s) Oral daily  aspirin enteric coated 81 milliGRAM(s) Oral daily  atorvastatin 10 milliGRAM(s) Oral at bedtime  escitalopram 20 milliGRAM(s) Oral daily  OLANZapine 10 milliGRAM(s) Oral at bedtime    MEDICATIONS  (PRN):  hydrOXYzine hydrochloride 25 milliGRAM(s) Oral every 6 hours PRN anxiety/insomnia  OLANZapine 2.5 milliGRAM(s) Oral every 6 hours PRN agitation  senna 2 Tablet(s) Oral daily PRN constipation

## 2024-08-20 PROBLEM — I10 ESSENTIAL (PRIMARY) HYPERTENSION: Chronic | Status: ACTIVE | Noted: 2024-08-02

## 2024-08-20 PROCEDURE — 99232 SBSQ HOSP IP/OBS MODERATE 35: CPT

## 2024-08-20 RX ORDER — AMLODIPINE BESYLATE 10 MG/1
1 TABLET ORAL
Qty: 14 | Refills: 0
Start: 2024-08-20 | End: 2024-09-02

## 2024-08-20 RX ORDER — ASPIRIN 500 MG
1 TABLET ORAL
Refills: 0 | DISCHARGE

## 2024-08-20 RX ORDER — AMLODIPINE BESYLATE 2.5 MG/1
1 TABLET ORAL
Refills: 0 | DISCHARGE

## 2024-08-20 RX ORDER — ESCITALOPRAM OXALATE 10 MG/1
1 TABLET ORAL
Qty: 14 | Refills: 0
Start: 2024-08-20 | End: 2024-09-02

## 2024-08-20 RX ORDER — OLANZAPINE 7.5 MG/1
1 TABLET ORAL
Qty: 14 | Refills: 0
Start: 2024-08-20 | End: 2024-09-02

## 2024-08-20 RX ORDER — ATORVASTATIN CALCIUM 40 MG/1
1 TABLET, FILM COATED ORAL
Refills: 0 | DISCHARGE

## 2024-08-20 RX ORDER — ASPIRIN 81 MG
1 TABLET, DELAYED RELEASE (ENTERIC COATED) ORAL
Qty: 14 | Refills: 0
Start: 2024-08-20 | End: 2024-09-02

## 2024-08-20 RX ADMIN — Medication 0.1 MILLIGRAM(S): at 01:15

## 2024-08-20 RX ADMIN — Medication 81 MILLIGRAM(S): at 08:43

## 2024-08-20 RX ADMIN — Medication 10 MILLIGRAM(S): at 21:03

## 2024-08-20 RX ADMIN — OLANZAPINE 10 MILLIGRAM(S): 7.5 TABLET ORAL at 21:03

## 2024-08-20 RX ADMIN — ESCITALOPRAM OXALATE 20 MILLIGRAM(S): 10 TABLET ORAL at 08:34

## 2024-08-20 NOTE — BH INPATIENT PSYCHIATRY PROGRESS NOTE - ATTENDING COMMENTS
I have reviewed case with PNP, made any necessary revisions and concur with findings and plans. 
I have reviewed case with PNP, made any necessary revisions and concur with findings and plans. n
I have reviewed case with PNP, made any necessary revisions and concur with findings and plans.

## 2024-08-20 NOTE — BH INPATIENT PSYCHIATRY PROGRESS NOTE - NSBHASSESSSUMMFT_PSY_ALL_CORE
58 y/o man, resides with his wife and 2 adult sons, retired supervisor at [employer], with a PMH of HTN and recent chronic lacunar infarct with no residual deficits diagnosed in July 2024, and PPH of recent dx of depression who was admitted to the medical floor for the evaluation of AMS with negative medical workup, and transferred to [facility] for further management of SI and threatening statements made to his wife I/s/o ongoing marital conflicts. Patient had admitted to infidelity to his wife resulting in marital stressors, was diagnosed with depression on 7/15 and placed on Lexapro but had not been taking it. Patient was seen and initially considered not to be a danger to himself or others with the plan to follow up with outpatient psychiatric and psychotherapy services, however the  psychiatry team was recalled because patient was said to be making threatening statements to his wife and again endorsing SI. His worsening mood and anxiety appears more likely in the context of fears of his wife leaving him. MOCA 25.     On evaluation, pt continues to present with hernandez affect and more fluid speech, good eye contact and ADLs. He reports feeling better with overall improving anxiety, sleep and appetite since admission. He reports resolution of paranoia and states he feels safe to return home. He denies AH or VH, does not appear RIS. He denies SI/HI, is future-oriented and agreeable to IOP f/u. He has been adherent to medications and not a behavioral concern. Anticipated discharge for tomorrow.    #MDD with psychotic fx  #Anxiety  -c/w lexapro 20 mg daily  -c/w zyprexa 10 mg qhs   -encourage groups/DBT    #Agitation  -for agitation not amenable to verbal redirection, may give haldol 3 mg q6h prn and/or ativan 1 mg q6h prn prn with escalation to IM if pt is refusing PO and is an acute danger to self or/and others with repeat EKG to ensure QTc <500 ms

## 2024-08-20 NOTE — BH INPATIENT PSYCHIATRY PROGRESS NOTE - NSBHMETABOLIC_PSY_ALL_CORE_FT
BMI: BMI (kg/m2): 24 (08-02-24 @ 14:17)  HbA1c: A1C with Estimated Average Glucose Result: 5.6 % (07-05-24 @ 12:37)    Glucose: POCT Blood Glucose.: 82 mg/dL (07-25-24 @ 00:24)    BP: 114/79 (08-20-24 @ 02:00) (114/79 - 157/103)Vital Signs Last 24 Hrs  T(C): 36.6 (08-20-24 @ 10:14), Max: 36.7 (08-19-24 @ 16:26)  T(F): 97.8 (08-20-24 @ 10:14), Max: 98 (08-19-24 @ 16:26)  HR: 74 (08-20-24 @ 10:14) (74 - 127)  BP: 114/79 (08-20-24 @ 02:00) (114/79 - 157/103)  BP(mean): --  RR: 18 (08-20-24 @ 10:14) (18 - 18)  SpO2: --      Lipid Panel: Date/Time: 07-05-24 @ 12:37  Cholesterol, Serum: 126  LDL Cholesterol Calculated: 58  HDL Cholesterol, Serum: 52  Total Cholesterol/HDL Ration Measurement: --  Triglycerides, Serum: 77

## 2024-08-20 NOTE — BH INPATIENT PSYCHIATRY PROGRESS NOTE - NSBHCHARTREVIEWVS_PSY_A_CORE FT
Vital Signs Last 24 Hrs  T(C): 36.6 (08-20-24 @ 10:14), Max: 36.7 (08-19-24 @ 16:26)  T(F): 97.8 (08-20-24 @ 10:14), Max: 98 (08-19-24 @ 16:26)  HR: 74 (08-20-24 @ 10:14) (74 - 127)  BP: 114/79 (08-20-24 @ 02:00) (114/79 - 157/103)  BP(mean): --  RR: 18 (08-20-24 @ 10:14) (18 - 18)  SpO2: --

## 2024-08-20 NOTE — BH INPATIENT PSYCHIATRY PROGRESS NOTE - NSBHFUPINTERVALHXFT_PSY_A_CORE
Pt seen and evaluated, chart reviewed. As per nursing report, pt was anxious last night, several PRNs given. On evaluation, pt presents sitting in dayroom and watching TV, agrees to interview privately in room. He admits to anxious ruminations last night regarding discharge, reports worry on whether he can continue to do well when he returns home. He reports feeling better this morning, is future-oriented and endorses hope on his continued recovery, agrees to IOP. He reports overall improved mood, sleep and appetite since admission. He denies AVH. He denies paranoia. He denies SI/HI, intent and plan. He denies negative side effects of medications. Pt has been more visible on unit and has not been a behavioral control. Anticipated discharge for tomorrow.   Spoke with pt's wife- denies safety concerns on discharge tomorrow.

## 2024-08-21 VITALS
SYSTOLIC BLOOD PRESSURE: 125 MMHG | DIASTOLIC BLOOD PRESSURE: 87 MMHG | HEART RATE: 98 BPM | TEMPERATURE: 98 F | RESPIRATION RATE: 18 BRPM

## 2024-08-21 PROCEDURE — 99238 HOSP IP/OBS DSCHRG MGMT 30/<: CPT

## 2024-08-21 RX ADMIN — ESCITALOPRAM OXALATE 20 MILLIGRAM(S): 10 TABLET ORAL at 08:43

## 2024-08-21 RX ADMIN — Medication 81 MILLIGRAM(S): at 08:43

## 2024-08-21 RX ADMIN — AMLODIPINE BESYLATE 5 MILLIGRAM(S): 10 TABLET ORAL at 08:43

## 2024-08-21 NOTE — BH INPATIENT PSYCHIATRY PROGRESS NOTE - NSBHMSEPERCEPT_PSY_A_CORE
No abnormalities
Unable to assess
Unable to assess
No abnormalities

## 2024-08-21 NOTE — BH INPATIENT PSYCHIATRY PROGRESS NOTE - NSTXDEPRESDATEEST_PSY_ALL_CORE
02-Aug-2024
05-Aug-2024
02-Aug-2024
05-Aug-2024
02-Aug-2024
02-Aug-2024
05-Aug-2024
02-Aug-2024
02-Aug-2024
05-Aug-2024

## 2024-08-21 NOTE — BH INPATIENT PSYCHIATRY PROGRESS NOTE - NSICDXBHSECONDARYDX_PSY_ALL_CORE
Anxiety   F41.9  

## 2024-08-21 NOTE — BH INPATIENT PSYCHIATRY PROGRESS NOTE - NSTXFALLDATETRGT_PSY_ALL_CORE
09-Aug-2024

## 2024-08-21 NOTE — BH INPATIENT PSYCHIATRY PROGRESS NOTE - NSBHROSSYSTEMS_PSY_ALL_CORE
Psychiatric
Gastrointestinal.../Psychiatric
Psychiatric

## 2024-08-21 NOTE — BH INPATIENT PSYCHIATRY PROGRESS NOTE - NSBHMSEMOVE_PSY_A_CORE
No abnormal movements
12-Feb-2024 18:10
No abnormal movements

## 2024-08-21 NOTE — BH CHART NOTE - NSBHPTASSESSDT_PSY_A_CORE
05-Aug-2024 10:32
08-Aug-2024 10:48
05-Aug-2024 10:56
06-Aug-2024 09:54
06-Aug-2024 10:13
21-Aug-2024 09:20

## 2024-08-21 NOTE — BH INPATIENT PSYCHIATRY PROGRESS NOTE - NSBHMSEIMPULSE_PSY_A_CORE
Unable to assess
Normal
Unable to assess
Normal

## 2024-08-21 NOTE — BH INPATIENT PSYCHIATRY PROGRESS NOTE - NSBHCHARTREVIEWVS_PSY_A_CORE FT
Vital Signs Last 24 Hrs  T(C): 36.7 (08-21-24 @ 08:18), Max: 36.7 (08-21-24 @ 08:18)  T(F): 98 (08-21-24 @ 08:18), Max: 98 (08-21-24 @ 08:18)  HR: 98 (08-21-24 @ 08:18) (16 - 98)  BP: 125/87 (08-21-24 @ 08:18) (125/87 - 159/110)  BP(mean): --  RR: 18 (08-21-24 @ 08:18) (16 - 18)  SpO2: --

## 2024-08-21 NOTE — BH INPATIENT PSYCHIATRY PROGRESS NOTE - NSBHASSESSSUMMFT_PSY_ALL_CORE
60 y/o man, resides with his wife and 2 adult sons, retired supervisor at [employer], with a PMH of HTN and recent chronic lacunar infarct with no residual deficits diagnosed in July 2024, and PPH of recent dx of depression who was admitted to the medical floor for the evaluation of AMS with negative medical workup, and transferred to [facility] for further management of SI and threatening statements made to his wife I/s/o ongoing marital conflicts. Patient had admitted to infidelity to his wife resulting in marital stressors, was diagnosed with depression on 7/15 and placed on Lexapro but had not been taking it. Patient was seen and initially considered not to be a danger to himself or others with the plan to follow up with outpatient psychiatric and psychotherapy services, however the  psychiatry team was recalled because patient was said to be making threatening statements to his wife and again endorsing SI. His worsening mood and anxiety appears more likely in the context of fears of his wife leaving him. MOCA 25.     On evaluation, pt continues to present with hernandez affect and more fluid speech, good eye contact and ADLs. He reports feeling better with overall improving anxiety, sleep and appetite since admission. He reports resolution of paranoia and states he feels safe to return home. He denies AH or VH, does not appear RIS. He denies SI/HI, is future-oriented and agreeable to IOP f/u. He has been adherent to medications and not a behavioral concern. Anticipated discharge for tomorrow.    #MDD with psychotic fx  #Anxiety  -c/w lexapro 20 mg daily  -c/w zyprexa 10 mg qhs   -encourage groups/DBT    #Agitation  -for agitation not amenable to verbal redirection, may give haldol 3 mg q6h prn and/or ativan 1 mg q6h prn prn with escalation to IM if pt is refusing PO and is an acute danger to self or/and others with repeat EKG to ensure QTc <500 ms 58 y/o man, resides with his wife and 2 adult sons, retired supervisor at [employer], with a PMH of HTN and recent chronic lacunar infarct with no residual deficits diagnosed in July 2024, and PPH of recent dx of depression who was admitted to the medical floor for the evaluation of AMS with negative medical workup, and transferred to [facility] for further management of SI and threatening statements made to his wife I/s/o ongoing marital conflicts. Patient had admitted to infidelity to his wife resulting in marital stressors, was diagnosed with depression on 7/15 and placed on Lexapro but had not been taking it. Patient was seen and initially considered not to be a danger to himself or others with the plan to follow up with outpatient psychiatric and psychotherapy services, however the  psychiatry team was recalled because patient was said to be making threatening statements to his wife and again endorsing SI. His worsening mood and anxiety appears more likely in the context of fears of his wife leaving him. MOCA 25.     On evaluation, pt continues to present with hernandez affect and more fluid speech, good eye contact and ADLs. He reports feeling better with overall improving mood, anxiety, sleep and appetite since admission. He reports resolution of paranoia and states he feels safe to return home. He denies AH or VH, does not appear RIS. He denies SI/HI, is future-oriented and agreeable to IOP f/u. He has been adherent to medications and not a behavioral concern. Discharge today.    #MDD with psychotic fx  #Anxiety  -c/w lexapro 20 mg daily  -c/w zyprexa 10 mg qhs   -encourage groups/DBT    #Agitation  -for agitation not amenable to verbal redirection, may give haldol 3 mg q6h prn and/or ativan 1 mg q6h prn prn with escalation to IM if pt is refusing PO and is an acute danger to self or/and others with repeat EKG to ensure QTc <500 ms 60 y/o man, resides with his wife and 2 adult sons, retired supervisor at [employer], with a PMH of HTN and recent chronic lacunar infarct with no residual deficits diagnosed in July 2024, and PPH of recent dx of depression who was admitted to the medical floor for the evaluation of AMS with negative medical workup, and transferred to [facility] for further management of SI and threatening statements made to his wife I/s/o ongoing marital conflicts. Patient had admitted to infidelity to his wife resulting in marital stressors, was diagnosed with depression on 7/15 and placed on Lexapro but had not been taking it. Patient was seen and initially considered not to be a danger to himself or others with the plan to follow up with outpatient psychiatric and psychotherapy services, however the  psychiatry team was recalled because patient was said to be making threatening statements to his wife and again endorsing SI. His worsening mood and anxiety appears more likely in the context of fears of his wife leaving him. MOCA 25.     On evaluation, pt continues to present with hernandez affect and more fluid speech, good eye contact and ADLs. He reports feeling better with overall improving mood, anxiety, sleep and appetite since admission. He reports resolution of paranoia and states he feels safe to return home. He denies AH or VH, does not appear RIS. He denies SI/HI, is future-oriented and agreeable to IOP f/u. He has been adherent to medications and reports some daytime drowsiness today. Pt has not been a behavioral concern. Discharge today.    #MDD with psychotic fx  #Anxiety  -c/w lexapro 20 mg daily  -c/w zyprexa 10 mg qhs   -encourage groups/DBT    #Agitation  -for agitation not amenable to verbal redirection, may give haldol 3 mg q6h prn and/or ativan 1 mg q6h prn prn with escalation to IM if pt is refusing PO and is an acute danger to self or/and others with repeat EKG to ensure QTc <500 ms

## 2024-08-21 NOTE — BH INPATIENT PSYCHIATRY PROGRESS NOTE - NSBHFUPINTERVALHXFT_PSY_A_CORE
Pt seen and evaluated, chart reviewed. As per nursing report, Pt seen and evaluated, chart reviewed. As per nursing report, no acute events overnight, no PRNs. On evaluation, pt presents well-groomed and cooperative, reports he is doing better since admission and ready for discharge today. He endorses overall improved mood and decrease in anxiety episodes since admission. He endorses improved sleep and appetite. He denies AH or VH. He denies paranoia. He denies SI/HI, intent and plan. Pt is future-oriented and agreeable to OP f/u. Pt has not been a behavioral concern. Discharge today. Pt and pt's wife verbalizes understanding to discharge instructions.  Pt seen and evaluated, chart reviewed. As per nursing report, no acute events overnight, no PRNs. On evaluation, pt presents well-groomed and cooperative, reports he is doing better since admission and ready for discharge today. He endorses overall improved mood and decrease in anxiety episodes since admission. He endorses improved sleep and appetite. He denies AH or VH. He denies paranoia. He denies SI/HI, intent and plan. Pt is future-oriented and agreeable to OP f/u. Pt has not been a behavioral concern. Discharge today. Pt verbalizes understanding to discharge instructions.

## 2024-08-21 NOTE — BH INPATIENT PSYCHIATRY PROGRESS NOTE - LEVEL OF CONSCIOUSNESS
Lethargic, arousable to tactile stimulus
Alert
Lethargic, arousable to tactile stimulus
Alert

## 2024-08-21 NOTE — BH INPATIENT PSYCHIATRY PROGRESS NOTE - NSBHMSEINSIGHT_PSY_A_CORE
Fair
Unable to assess
Fair
Fair
Unable to assess
Fair
Unable to assess
Unable to assess
Fair

## 2024-08-21 NOTE — BH INPATIENT PSYCHIATRY PROGRESS NOTE - NSTXFALLPROGRES_PSY_ALL_CORE
Met - goal discontinued
Improving
Met - goal discontinued
Improving
Met - goal discontinued

## 2024-08-21 NOTE — BH INPATIENT PSYCHIATRY PROGRESS NOTE - NSBHCHARTREVIEWINVESTIGATE_PSY_A_CORE FT
< from: 12 Lead ECG (07.25.24 @ 06:42) >      Ventricular Rate 72 BPM    Atrial Rate 72 BPM    P-R Interval 102 ms    QRS Duration 80 ms    Q-T Interval 372 ms    QTC Calculation(Bazett) 407 ms    P Axis 76 degrees    R Axis 5 degrees    T Axis 67 degrees    Diagnosis Line Sinus rhythm with short HI  Borderline ECG    < end of copied text >    
< from: 12 Lead ECG (07.25.24 @ 06:42) >      Ventricular Rate 72 BPM    Atrial Rate 72 BPM    P-R Interval 102 ms    QRS Duration 80 ms    Q-T Interval 372 ms    QTC Calculation(Bazett) 407 ms    P Axis 76 degrees    R Axis 5 degrees    T Axis 67 degrees    Diagnosis Line Sinus rhythm with short NV  Borderline ECG    < end of copied text >    
< from: 12 Lead ECG (07.25.24 @ 06:42) >      Ventricular Rate 72 BPM    Atrial Rate 72 BPM    P-R Interval 102 ms    QRS Duration 80 ms    Q-T Interval 372 ms    QTC Calculation(Bazett) 407 ms    P Axis 76 degrees    R Axis 5 degrees    T Axis 67 degrees    Diagnosis Line Sinus rhythm with short MA  Borderline ECG    < end of copied text >    
< from: 12 Lead ECG (07.25.24 @ 06:42) >      Ventricular Rate 72 BPM    Atrial Rate 72 BPM    P-R Interval 102 ms    QRS Duration 80 ms    Q-T Interval 372 ms    QTC Calculation(Bazett) 407 ms    P Axis 76 degrees    R Axis 5 degrees    T Axis 67 degrees    Diagnosis Line Sinus rhythm with short MO  Borderline ECG    < end of copied text >    
< from: 12 Lead ECG (07.25.24 @ 06:42) >      Ventricular Rate 72 BPM    Atrial Rate 72 BPM    P-R Interval 102 ms    QRS Duration 80 ms    Q-T Interval 372 ms    QTC Calculation(Bazett) 407 ms    P Axis 76 degrees    R Axis 5 degrees    T Axis 67 degrees    Diagnosis Line Sinus rhythm with short ID  Borderline ECG    < end of copied text >    
< from: 12 Lead ECG (07.25.24 @ 06:42) >      Ventricular Rate 72 BPM    Atrial Rate 72 BPM    P-R Interval 102 ms    QRS Duration 80 ms    Q-T Interval 372 ms    QTC Calculation(Bazett) 407 ms    P Axis 76 degrees    R Axis 5 degrees    T Axis 67 degrees    Diagnosis Line Sinus rhythm with short ME  Borderline ECG    < end of copied text >    
< from: 12 Lead ECG (07.25.24 @ 06:42) >      Ventricular Rate 72 BPM    Atrial Rate 72 BPM    P-R Interval 102 ms    QRS Duration 80 ms    Q-T Interval 372 ms    QTC Calculation(Bazett) 407 ms    P Axis 76 degrees    R Axis 5 degrees    T Axis 67 degrees    Diagnosis Line Sinus rhythm with short SC  Borderline ECG    < end of copied text >    
< from: 12 Lead ECG (07.25.24 @ 06:42) >      Ventricular Rate 72 BPM    Atrial Rate 72 BPM    P-R Interval 102 ms    QRS Duration 80 ms    Q-T Interval 372 ms    QTC Calculation(Bazett) 407 ms    P Axis 76 degrees    R Axis 5 degrees    T Axis 67 degrees    Diagnosis Line Sinus rhythm with short DC  Borderline ECG    < end of copied text >    
< from: 12 Lead ECG (07.25.24 @ 06:42) >      Ventricular Rate 72 BPM    Atrial Rate 72 BPM    P-R Interval 102 ms    QRS Duration 80 ms    Q-T Interval 372 ms    QTC Calculation(Bazett) 407 ms    P Axis 76 degrees    R Axis 5 degrees    T Axis 67 degrees    Diagnosis Line Sinus rhythm with short IL  Borderline ECG    < end of copied text >    
< from: 12 Lead ECG (07.25.24 @ 06:42) >      Ventricular Rate 72 BPM    Atrial Rate 72 BPM    P-R Interval 102 ms    QRS Duration 80 ms    Q-T Interval 372 ms    QTC Calculation(Bazett) 407 ms    P Axis 76 degrees    R Axis 5 degrees    T Axis 67 degrees    Diagnosis Line Sinus rhythm with short OK  Borderline ECG    < end of copied text >    
< from: 12 Lead ECG (07.25.24 @ 06:42) >      Ventricular Rate 72 BPM    Atrial Rate 72 BPM    P-R Interval 102 ms    QRS Duration 80 ms    Q-T Interval 372 ms    QTC Calculation(Bazett) 407 ms    P Axis 76 degrees    R Axis 5 degrees    T Axis 67 degrees    Diagnosis Line Sinus rhythm with short AZ  Borderline ECG    < end of copied text >    
< from: 12 Lead ECG (07.25.24 @ 06:42) >      Ventricular Rate 72 BPM    Atrial Rate 72 BPM    P-R Interval 102 ms    QRS Duration 80 ms    Q-T Interval 372 ms    QTC Calculation(Bazett) 407 ms    P Axis 76 degrees    R Axis 5 degrees    T Axis 67 degrees    Diagnosis Line Sinus rhythm with short OK  Borderline ECG    < end of copied text >    
< from: 12 Lead ECG (07.25.24 @ 06:42) >      Ventricular Rate 72 BPM    Atrial Rate 72 BPM    P-R Interval 102 ms    QRS Duration 80 ms    Q-T Interval 372 ms    QTC Calculation(Bazett) 407 ms    P Axis 76 degrees    R Axis 5 degrees    T Axis 67 degrees    Diagnosis Line Sinus rhythm with short MN  Borderline ECG    < end of copied text >

## 2024-08-21 NOTE — BH INPATIENT PSYCHIATRY PROGRESS NOTE - NSTXDCOPLKPROGRES_PSY_ALL_CORE
Improving
Met - goal discontinued
Improving
Met - goal discontinued
Improving

## 2024-08-21 NOTE — BH INPATIENT PSYCHIATRY PROGRESS NOTE - NSTXDEPRESGOAL_PSY_ALL_CORE
Will identify 2 coping skills that assist in improving mood
Exhibit improvements in self-grooming, hygiene, sleep and appetite
Will identify 2 coping skills that assist in improving mood
Will identify 2 coping skills that assist in improving mood
Exhibit improvements in self-grooming, hygiene, sleep and appetite
Will identify 2 coping skills that assist in improving mood

## 2024-08-21 NOTE — BH INPATIENT PSYCHIATRY PROGRESS NOTE - NSBHMSEKNOW_PSY_A_CORE
Normal
Unable to assess
Normal
Unable to assess
Normal
Normal

## 2024-08-21 NOTE — BH INPATIENT PSYCHIATRY PROGRESS NOTE - NSBHMSETHTASSOC_PSY_A_CORE
DATE OF SURGERY:  2023



HISTORY OF PRESENT ILLNESS:  The patient is a 51-year-old female presented for four year 
follow up of some colon polyps. Her mother may have had colon cancer well. She has no 
other complaints at this time. 



PAST MEDICAL HISTORY:   Hypertension, hyperlipidemia, thyroid, asthma, anxiety, 
depression. 



PAST SURGICAL HISTORY:  Uterine ablation.  section. Tubal ligation.



ALLERGIES:  NKDA.



MEDICATIONS:  Bupropion, atorvastatin, venlafaxine, levothyroxine, albuterol, Seroquel.  



FAMILY HISTORY:   Colon cancer. Cervical cancer. Heart disease. Thyroid. 



SOCIAL HISTORY:  Occasional alcohol. 



REVIEW OF SYSTEMS: CONSTITUTIONAL:  Denies fever or chills. CHEST: Denies shortness of 
breath. CVS: Denies chest pain. ABDOMEN: Denies abdominal pain. 



PHYSICAL EXAMINATION:  

GENERAL:  No acute distress.  

CHEST: Nonlabored. No shortness of breath. 

CVS:  Regular rate and rhythm.

ABDOMEN: Soft. 



IMPRESSION:  History of colon polyp and family history of colon cancer.  



PLAN:  Colonoscopy with Dr. Moiz Tinajero. 



As dictated by Cindi Mcginnis NP.
Normal
Unable to assess
Normal
Unable to assess
Normal

## 2024-08-21 NOTE — BH INPATIENT PSYCHIATRY PROGRESS NOTE - NSBHATTESTBILLONSITE_PSY_A_CORE
RENÉ to bill

## 2024-08-21 NOTE — BH INPATIENT PSYCHIATRY PROGRESS NOTE - NSBHPSYCHOLCOGORIENT_PSY_A_CORE
Oriented to time, place, person, situation
Oriented to time, place, person, situation
Unable to assess
Oriented to time, place, person, situation
Unable to assess
Oriented to time, place, person, situation
Oriented to time, place, person, situation

## 2024-08-21 NOTE — BH INPATIENT PSYCHIATRY PROGRESS NOTE - NSTXPSYCHODATEEST_PSY_ALL_CORE
06-Aug-2024

## 2024-08-21 NOTE — BH INPATIENT PSYCHIATRY PROGRESS NOTE - NSBHMSEGAIT_PSY_A_CORE
Normal gait / station
Unable to assess
Normal gait / station
Unable to assess
Unable to assess
Normal gait / station
Unable to assess
Normal gait / station

## 2024-08-21 NOTE — BH INPATIENT PSYCHIATRY PROGRESS NOTE - NSTXDEPRESDATENEW_PSY_ALL_CORE
16-Aug-2024

## 2024-08-21 NOTE — BH INPATIENT PSYCHIATRY PROGRESS NOTE - NSTXDCOPLKDATETRGT_PSY_ALL_CORE
16-Aug-2024
30-Aug-2024
16-Aug-2024
30-Aug-2024
16-Aug-2024

## 2024-08-21 NOTE — BH INPATIENT PSYCHIATRY PROGRESS NOTE - NSTXSUICIDPROGRES_PSY_ALL_CORE
Met - goal discontinued

## 2024-08-21 NOTE — BH INPATIENT PSYCHIATRY PROGRESS NOTE - NSBHATTESTBILLING_PSY_A_CORE
[id number]-Eeihsqfodd OBS or IP - moderate complexity OR 35-49 mins
[id number]-Henjycwklw OBS or IP - moderate complexity OR 35-49 mins
11575-Mjkzxaaduq OBS or IP - moderate complexity OR 35-49 mins
[id number]-Niyauvjfss OBS or IP - moderate complexity OR 35-49 mins
55226-Rhcfnqircf OBS or IP - moderate complexity OR 35-49 mins
86510-Sfaiatwxod OBS or IP - moderate complexity OR 35-49 mins
32433-Oisybuunrg OBS or IP - moderate complexity OR 35-49 mins
00709-Wvvlewxymw OBS or IP - moderate complexity OR 35-49 mins
[id number]-Yphjixyrig OBS or IP - moderate complexity OR 35-49 mins
09657-Gvdnfqhboc OBS or IP - moderate complexity OR 35-49 mins
81652-Noairmjrkj OBS or IP - moderate complexity OR 35-49 mins
14688-Ffujvjqtdi OBS or IP - moderate complexity OR 35-49 mins
14510-Lbiysfioua OBS or IP - moderate complexity OR 35-49 mins
55486-Ydqsqjvgsw OBS or IP - moderate complexity OR 35-49 mins
72419-Nxixhagvdz OBS or IP - moderate complexity OR 35-49 mins

## 2024-08-21 NOTE — BH INPATIENT PSYCHIATRY PROGRESS NOTE - NSTXSUICIDDATEEST_PSY_ALL_CORE
02-Aug-2024

## 2024-08-21 NOTE — BH INPATIENT PSYCHIATRY PROGRESS NOTE - NSTXDCOPLKINTERMD_PSY_ALL_CORE
SW coordination

## 2024-08-21 NOTE — BH INPATIENT PSYCHIATRY PROGRESS NOTE - NSTXDEPRESDATETRGT_PSY_ALL_CORE
19-Aug-2024
03-Sep-2024
23-Aug-2024
23-Aug-2024
16-Aug-2024
16-Aug-2024
23-Aug-2024
19-Aug-2024
09-Aug-2024
19-Aug-2024
09-Aug-2024
16-Aug-2024
16-Aug-2024
19-Aug-2024
16-Aug-2024

## 2024-08-21 NOTE — BH INPATIENT PSYCHIATRY PROGRESS NOTE - NSTXFALLINTERMD_PSY_ALL_CORE
Fall precautions

## 2024-08-21 NOTE — BH CHART NOTE - NSNOTETYPE_PSY_ALL_CORE
Event Note
Event Note
Psychology Progress Note
Event Note
Psychology Progress Note
Suicide Risk Assessment

## 2024-08-21 NOTE — BH INPATIENT PSYCHIATRY PROGRESS NOTE - NSBHMSEATTEN_PSY_A_CORE
Normal
Normal
Impaired
Normal
Impaired
Normal

## 2024-08-21 NOTE — BH CHART NOTE - NSSUICPROTFACT_PSY_ALL_CORE
Responsibility to children, family, or others/Identifies reasons for living/Supportive social network of family or friends/Cultural, spiritual and/or moral attitudes against suicide/[employer] beliefs

## 2024-08-21 NOTE — BH INPATIENT PSYCHIATRY PROGRESS NOTE - NSBHMSEMOOD_PSY_A_CORE
Anxious/Other
Anxious/Other
Other
Depressed/Anxious
Anxious/Other
Anxious
Anxious/Other
Depressed/Anxious
Other
Anxious/Other
Depressed/Anxious
Anxious/Other
Anxious
Anxious/Other
Anxious/Other

## 2024-08-21 NOTE — BH INPATIENT PSYCHIATRY PROGRESS NOTE - NSTXPROBFALL_PSY_ALL_CORE
FALL RISK

## 2024-08-21 NOTE — BH INPATIENT PSYCHIATRY PROGRESS NOTE - NSTXANXINTERMD_PSY_ALL_CORE
Medications management, encourage groups/DBT

## 2024-08-21 NOTE — BH INPATIENT PSYCHIATRY PROGRESS NOTE - NSBHMSEJUDGE_PSY_A_CORE
Unable to assess
Fair
Unable to assess
Fair
Unable to assess
Fair
Fair
Unable to assess
Fair

## 2024-08-21 NOTE — BH INPATIENT PSYCHIATRY PROGRESS NOTE - NSBHMSEAFFQUAL_PSY_A_CORE
Depressed/Anxious
Depressed/Anxious
Anxious
Depressed/Anxious
Depressed/Anxious
Depressed
Depressed/Anxious
Depressed/Anxious
Anxious
Depressed/Anxious
Depressed
Anxious

## 2024-08-21 NOTE — BH INPATIENT PSYCHIATRY PROGRESS NOTE - NSICDXBHTERTIARYDX_PSY_ALL_CORE
R/O MDD (major depressive disorder)   F32.9  R/O Adjustment disorder   F43.20  
R/O MDD (major depressive disorder)   F32.9  R/O Adjustment disorder   F43.20  R/O Major depressive disorder with psychotic features   F32.3  
R/O MDD (major depressive disorder)   F32.9  R/O Adjustment disorder   F43.20  
R/O MDD (major depressive disorder)   F32.9  R/O Adjustment disorder   F43.20  R/O Major depressive disorder with psychotic features   F32.3  
R/O MDD (major depressive disorder)   F32.9  R/O Adjustment disorder   F43.20  R/O Major depressive disorder with psychotic features   F32.3

## 2024-08-21 NOTE — BH INPATIENT PSYCHIATRY PROGRESS NOTE - NSTXPSYCHOPROGRES_PSY_ALL_CORE
Improving
Improving
Met - goal discontinued

## 2024-08-21 NOTE — BH INPATIENT PSYCHIATRY PROGRESS NOTE - NSBHMSESPEECH_PSY_A_CORE
Normal volume, rate, productivity, spontaneity and articulation
Abnormal as indicated, otherwise normal...
Abnormal as indicated, otherwise normal...
Normal volume, rate, productivity, spontaneity and articulation

## 2024-08-21 NOTE — BH INPATIENT PSYCHIATRY PROGRESS NOTE - NSBHMSETHTPROC_PSY_A_CORE
Unable to assess
Perseverative
Linear
Perseverative
Linear
Unable to assess
Linear

## 2024-08-21 NOTE — BH INPATIENT PSYCHIATRY PROGRESS NOTE - NSDCCRITERIA_PSY_ALL_CORE
When pt is no longer an acute or imminent risk of harm to self or/and others, and is able to care for self safely, pt may then be discharged

## 2024-08-21 NOTE — BH INPATIENT PSYCHIATRY PROGRESS NOTE - NSTXSUICIDINTERMD_PSY_ALL_CORE
Medications management, encourage groups/DBT, suicide precautions, safety plan

## 2024-08-21 NOTE — BH INPATIENT PSYCHIATRY PROGRESS NOTE - NSBHMETABOLIC_PSY_ALL_CORE_FT
BMI: BMI (kg/m2): 24 (08-02-24 @ 14:17)  HbA1c: A1C with Estimated Average Glucose Result: 5.6 % (07-05-24 @ 12:37)    Glucose: POCT Blood Glucose.: 82 mg/dL (07-25-24 @ 00:24)    BP: 125/87 (08-21-24 @ 08:18) (114/79 - 159/110)Vital Signs Last 24 Hrs  T(C): 36.7 (08-21-24 @ 08:18), Max: 36.7 (08-21-24 @ 08:18)  T(F): 98 (08-21-24 @ 08:18), Max: 98 (08-21-24 @ 08:18)  HR: 98 (08-21-24 @ 08:18) (16 - 98)  BP: 125/87 (08-21-24 @ 08:18) (125/87 - 159/110)  BP(mean): --  RR: 18 (08-21-24 @ 08:18) (16 - 18)  SpO2: --      Lipid Panel: Date/Time: 07-05-24 @ 12:37  Cholesterol, Serum: 126  LDL Cholesterol Calculated: 58  HDL Cholesterol, Serum: 52  Total Cholesterol/HDL Ration Measurement: --  Triglycerides, Serum: 77

## 2024-08-21 NOTE — BH INPATIENT PSYCHIATRY PROGRESS NOTE - NSTXFALLGOAL_PSY_ALL_CORE
Call for assistance before getting out of bed or chair

## 2024-08-21 NOTE — BH INPATIENT PSYCHIATRY PROGRESS NOTE - NSTXPSYCHODATETRGT_PSY_ALL_CORE
13-Aug-2024

## 2024-08-21 NOTE — BH INPATIENT PSYCHIATRY PROGRESS NOTE - NSCGISEVERILLNESS_PSY_ALL_CORE
3 = Mildly ill – clearly established symptoms with minimal, if any, distress or difficulty in social and occupational function
3 = Mildly ill – clearly established symptoms with minimal, if any, distress or difficulty in social and occupational function
2 = Borderline mentally ill – subtle or suspected pathology

## 2024-08-21 NOTE — BH INPATIENT PSYCHIATRY PROGRESS NOTE - NSTXPSYCHOGOAL_PSY_ALL_CORE
State that he/she is only about 50% sure of the certainty of the delusions instead of 100% certain

## 2024-08-21 NOTE — BH INPATIENT PSYCHIATRY PROGRESS NOTE - NSTXDEPRESPROGRES_PSY_ALL_CORE
Improving
Met - goal discontinued
Improving
Met - goal discontinued
Improving
Improving

## 2024-08-21 NOTE — BH INPATIENT PSYCHIATRY PROGRESS NOTE - NSTXDCOPLKDATEEST_PSY_ALL_CORE
02-Aug-2024
02-Aug-2024
16-Aug-2024
02-Aug-2024
16-Aug-2024
02-Aug-2024
16-Aug-2024
02-Aug-2024
16-Aug-2024
02-Aug-2024

## 2024-08-21 NOTE — BH INPATIENT PSYCHIATRY PROGRESS NOTE - NSTXANXPROGRES_PSY_ALL_CORE
Improving
Met - goal discontinued
Improving
Met - goal discontinued

## 2024-08-21 NOTE — BH INPATIENT PSYCHIATRY PROGRESS NOTE - NSBHATTESTATTENDNAMEFT_PSY_A_CORE
Dr. Jin

## 2024-08-21 NOTE — BH INPATIENT PSYCHIATRY PROGRESS NOTE - NSTXSUICIDGOAL_PSY_ALL_CORE
Will develop a suicide prevention/safety plan

## 2024-08-21 NOTE — BH CHART NOTE - RISK ASSESSMENT
Suicide and risk assessment performed prior to discharge. The patient with low acute risk. Protective factors include help-seeking bx, denying SI/HI, no SIB, able to verbalize reasons for living, good social supports in their family, no substance abuse, no hopelessness, future-oriented in returning to home, agreeable to OP f/u, no access to firearms, able to safety plan. Risk factors include presenting illness. Immediate risk was minimized by inpatient admission to a safe environment with appropriate supervision and limited access to lethal means. Future risk was minimized before discharge by treatment of acute episode, maximizing outpatient support, providing relevant patient education, discussing emergency procedures, and ensuring close follow-up. The patient remains at a low risk of self-harm, and such risk cannot be further ameliorated by continued inpatient treatment and the patient is therefore appropriate for discharge.

## 2024-08-21 NOTE — BH INPATIENT PSYCHIATRY PROGRESS NOTE - NSBHMSEINTELL_PSY_A_CORE
Average
Unable to assess
Average
Unable to assess
Average

## 2024-08-21 NOTE — BH INPATIENT PSYCHIATRY PROGRESS NOTE - NSBHMSEMUSCLE_PSY_A_CORE
Unable to assess
Normal muscle tone/strength
Unable to assess
Normal muscle tone/strength
Unable to assess
Unable to assess
Normal muscle tone/strength

## 2024-08-22 ENCOUNTER — APPOINTMENT (OUTPATIENT)
Dept: PSYCHIATRY | Facility: CLINIC | Age: 59
End: 2024-08-22

## 2024-08-22 RX ORDER — ESCITALOPRAM OXALATE 10 MG/1
1 TABLET ORAL
Qty: 14 | Refills: 0
Start: 2024-08-22 | End: 2024-09-04

## 2024-08-22 RX ORDER — OLANZAPINE 7.5 MG/1
1 TABLET ORAL
Qty: 14 | Refills: 0
Start: 2024-08-22 | End: 2024-09-04

## 2024-08-23 RX ORDER — OLANZAPINE 7.5 MG/1
1 TABLET ORAL
Qty: 8 | Refills: 0
Start: 2024-08-23 | End: 2024-08-30

## 2024-08-23 RX ORDER — ESCITALOPRAM OXALATE 10 MG/1
1 TABLET ORAL
Qty: 8 | Refills: 0
Start: 2024-08-23 | End: 2024-08-30

## 2024-08-23 NOTE — BH SOCIAL WORK CONFIRMATION FOLLOW UP NOTE - NSCOMMENTS_PSY_ALL_CORE
[facility] contact call was made by  (8/22); contact was made with patient via telephone to check in and review upcoming appointments.

## 2024-08-27 ENCOUNTER — APPOINTMENT (OUTPATIENT)
Dept: PSYCHIATRY | Facility: CLINIC | Age: 59
End: 2024-08-27

## 2024-08-27 DIAGNOSIS — F32.3 MAJOR DEPRESSIVE DISORDER, SINGLE EPISODE, SEVERE WITH PSYCHOTIC FEATURES: ICD-10-CM

## 2024-08-27 DIAGNOSIS — Z79.899 OTHER LONG TERM (CURRENT) DRUG THERAPY: ICD-10-CM

## 2024-08-27 DIAGNOSIS — I10 ESSENTIAL (PRIMARY) HYPERTENSION: ICD-10-CM

## 2024-08-27 DIAGNOSIS — R45.851 SUICIDAL IDEATIONS: ICD-10-CM

## 2024-08-27 DIAGNOSIS — Z79.82 LONG TERM (CURRENT) USE OF ASPIRIN: ICD-10-CM

## 2024-08-27 DIAGNOSIS — Z81.8 FAMILY HISTORY OF OTHER MENTAL AND BEHAVIORAL DISORDERS: ICD-10-CM

## 2024-08-27 DIAGNOSIS — Z86.73 PERSONAL HISTORY OF TRANSIENT ISCHEMIC ATTACK (TIA), AND CEREBRAL INFARCTION WITHOUT RESIDUAL DEFICITS: ICD-10-CM

## 2024-08-27 DIAGNOSIS — F41.9 ANXIETY DISORDER, UNSPECIFIED: ICD-10-CM

## 2024-08-27 DIAGNOSIS — F43.20 ADJUSTMENT DISORDER, UNSPECIFIED: ICD-10-CM

## 2024-08-29 ENCOUNTER — APPOINTMENT (OUTPATIENT)
Dept: PSYCHIATRY | Facility: CLINIC | Age: 59
End: 2024-08-29

## 2024-08-29 ENCOUNTER — OUTPATIENT (OUTPATIENT)
Dept: OUTPATIENT SERVICES | Facility: HOSPITAL | Age: 59
LOS: 1 days | End: 2024-08-29
Payer: COMMERCIAL

## 2024-08-29 DIAGNOSIS — F32.A DEPRESSION, UNSPECIFIED: ICD-10-CM

## 2024-08-29 DIAGNOSIS — F32.2 MAJOR DEPRESSIVE DISORDER, SINGLE EPISODE, SEVERE W/OUT PSYCHOTIC FEATURES: ICD-10-CM

## 2024-08-29 PROCEDURE — 90791 PSYCH DIAGNOSTIC EVALUATION: CPT

## 2024-08-29 NOTE — RISK ASSESSMENT
[Clinical Records] : Clinical Records [In last 30 days] : in the last 30 days [No] : No [No known suicide factors] : No known suicide factors

## 2024-08-29 NOTE — PHYSICAL EXAM
[Slowed] : slowed [Cooperative] : cooperative [Constricted] : constricted [Clear] : clear [Linear/Goal Directed] : linear/goal directed [Average] : average [WNL] : within normal limits

## 2024-08-30 DIAGNOSIS — F32.A DEPRESSION, UNSPECIFIED: ICD-10-CM

## 2024-09-04 PROBLEM — F32.2 MDD (MAJOR DEPRESSIVE DISORDER), SEVERE: Status: ACTIVE | Noted: 2024-09-04

## 2024-09-04 NOTE — FAMILY HISTORY
[FreeTextEntry1] : Family composition: [ ], pt, wife, 2 sons (21 and 29) Family history and background - Pt was born in [facility], raised in [place] and [place], father was in the , father  and mother lives in [place]. Pt reports he is close with his 2 sons.   Family relationship [ ] Pt reports close ties to all family members.  Pertinent Family Medical, MH and Substance Use History including Adult Child of Alcoholic and child of substance abuse status; history of cancer and heart disease- no

## 2024-09-04 NOTE — SOCIAL HISTORY
[FreeTextEntry1] : Social History   Employment history - [employer] for 31 yrs, pt retired 2019.   Developmental history - none Sexual hx/identity Sexual History/ Concern (include sexual orientation and other relevant information) - none   Social supports (AA, etc.)  ?   Spiritual Assessment Tool - SHERRY WILDE What is your aniya or belief? Pentacostal  Do you consider yourself spiritual or Congregational? Congregational Is there something you believe in that gives meaning to your life? wife and kids, family I: Is it important in your life? yes What influence does it have on how you take care of yourself? I must work and get proper amount of rest  How have your beliefs influenced your behavior during this illness? What role do your beliefs play in regaining your health? none C. Are you part of a spiritual or Congregational community? yes Is this of support to you and how? you can speak to the  if you need help.   Is there a person or group of people you really love or who are really important to you? [ ] family and friends  H. We have been discussing your belief and supports. What else gives you internal support? going out, walking, doing things  What are your sources of hope, strength, comfort and peace? family What do you hold on to during difficult times? [ ] family what sustains you and keeps you going? [ ] family A. How would you like me, your healthcare provider, to address these issues in your healthcare? [ ] unknown  BRIEF TOBACCO CESSATION INTERVENTION  Do you Smoke? no  Do you want to quit?   -ASK  Number of cigarettes _____ cigars _____ pipe bowls _____ per day  Number of cartridges per week ______  Number of years used ______  How soon after you wake up do you vape? ?  Previous quit attempts: # of attempts ___ longest quit period _1 month__ methods(s) used __Chew gum________________  How long ago was last attempts to quit __year_____ years __________ months  Reasons for wanting to quit ________________________  -ADVISE about the oral benefits of quitting  -ASSESS willingness to make a quit attempts (Stage of Change)  -Precontemplation (Stop here & Reassess next visit) Contemplation ?Preparation  -ASSIST (depending on stage of change)  Self-Help Pamphlets & Materials  List of local community group/ individuals quit programs and phone help lines  Encourage a quite date (for those who are ready)  Pharmacotherapy: Nicotine gum/ Lozenge/ Patch/ Inhaler/NS/Zyban/ Chantix  RX: ()  -ARRANGE Follow up if set a quit date (with permission)  Quite Date: __________________________ Phone calls or visits: ?Week 1-2 Months ? 1 ? 3 ? 6 ?12  -Yearly Reassessment __ No Changes of Smoking __Change of Smoking Habit (Smoker-Non-Smoker/ Smoker to Non Smoker)  (If there is change from Non Smoker to Smoker, please fill out new BRIEF TOBACCO CESSATION INTERVENTION FORM)  Date of Yearly Reassessment : ___________________ Date of Yearly Reassessment : ___________________   If the patient agreed on working on any positive screening on assessment, you should include this problem in your goals.

## 2024-09-04 NOTE — REASON FOR VISIT
[Number can be texted] : number can be texted [Psychiatric Inpatient] : Psychiatric Inpatient [Patient] : Patient [Prior Medical Records] : Prior Medical Records [FreeTextEntry4] : 11:18 [FreeTextEntry1] : [phone] [FreeTextEntry3] : [email or web address] [FreeTextEntry5] : English [FreeTextEntry6] : Michael

## 2024-09-04 NOTE — FAMILY HISTORY
[FreeTextEntry1] : Family composition: [ ], pt, wife, 2 sons (21 and 29) Family history and background - Pt was born in [facility], raised in [place] and [place], father was in the , father  and mother lives in [place]. Pt reports he is close with his 2 sons.   Family relationship [ ] Pt reports close ties to all family members.  Pertinent Family Medical, MH and Substance Use History including Adult Child of Alcoholic and child of substance abuse status; history of cancer and heart disease- no

## 2024-09-04 NOTE — PSYCHOSOCIAL ASSESSMENT
[None known] : None known [HS Diploma or GED] : HS Diploma or GED [tried hard not to think about the event(s) or went out of your way to avoid situations that reminded you of the event] : tried hard to avoid thinking about events or avoid situations that reminded patient of the event [Other employment] : other employment [Retired] : retired [Financially stable] : financially stable [None] : none [Private residence (home, apartment, rooming house, hotel, motel, supported housing, supported Single Room Occupancy (SRO),] : Private residence (home, apartment, rooming house, hotel, motel, supported housing, supported Single Room Occupancy (SRO), permanent housing programs, transient housing programs, and shelter plus care housing) [Client's spouse or domestic partner] : client's spouse or domestic partner [Yes] : yes [N/A] : n/a [Spouse/Partner] : spouse/partner [Good] : good [Lives with Family Member] : lives with family member [No] : Prior or current active [place]  service? No [FreeTextEntry3] : Pt gets along well with his peers, has many friends from work and his childhood.  [had nightmares about the event(s) or thought about the event(s) when you did not want] : did not have nightmares and/or unwanted thoughts about the events [FreeTextEntry1] : none [has been constantly on guard, watchful, or easily startled] : has not been constantly on guard, watchful, or easily startled [felt numb or detached from people, activities, or your surrounding] : has not felt numb or detached from people, activities, or surroundings [felt guilty or unable to stop blaming yourself or others for the event(s) or any problems the event(s) may have caused] : has not felt guilty or unable to stop blaming self or others for event(s), or any problems the event(s) may have caused [FreeTextEntry7] : Kianna Leaks

## 2024-09-04 NOTE — REASON FOR VISIT
[Number can be texted] : number can be texted [Psychiatric Inpatient] : Psychiatric Inpatient [Patient] : Patient [Prior Medical Records] : Prior Medical Records [FreeTextEntry4] : 11:18 [FreeTextEntry1] : [phone] [FreeTextEntry3] : [email or web address] [FreeTextEntry5] : English [FreeTextEntry6] : Michael

## 2024-09-04 NOTE — HISTORY OF PRESENT ILLNESS
[Not Applicable] : Not applicable [Suicidal Behavior/Ideation] : suicidal behavior/ideation [FreeTextEntry1] : HPI (Include Illness Quality, Severity, Duration, Timing, Context, Modifying Factors, Associated Signs and Symptoms)	Mr Pizarro is a 59 year old Black man, resides with his wife and 2 sons ( 21 and 29 years old ) retired supervisor at [employer] , on a pension, with a PMH of HTN and recent chronic lacunar infarct with no residual deficits diagnosed in July 2024, and PPH of recent dx of depression who was admitted to the medical floor for the evaluation of Altered mental status. Medical workup was negative. According to the medical team, patient has significant marital stressors and was diagnosed with Depression on 7/15, was placed on Lexapro but has not been taking it . psychiatry consult was called for the evaluation of depressed mood and possible suicidal ideations.      During evaluation in ED, Patient reports that he remembers telling staff that he was having thoughts of ending his life,  however denies currently having such thoughts . he reports that he has been going though a lot of issues lately and has had to deal with the consequences of his actions and the way it has affected his marriage and his family, patient reports that he engaged in behaviors that he has no business engaging in  and this has made him very depressed , hopeless and helpless and became  disappointed in himself . he reports that he was prescribed a medication called Lexapro by a psychiatrist he saw a few weeks ago however he did not take it . Patient denies acute symptoms of psychosis or alexandro . He also denies current use of illicit drugs or alcohol.  Collateral information was obtained from patient's wife Jaylin ( [phone] ) .  She reports that he had been depressed and anxious  about a week a go , had brought him to the emergency room where he was seen then  but was cleared and referred for outpatient psychiatry services . She reports that patient continued to report being depressed causing her to take him to see the psychiatrist at [facility]  who wrote him a prescription of Lexapro 5mg for anxiety . She confirmed that patient did not take the medication. She reports that it was at the emergency room this time around that he seemed to have told the staff that he was having suicidal thoughts which he later confirmed to her .  She reports that in her opinion, the suicidal thoughts are secondary to the feeling of shame and guilt he has been feelings for the past 6 weeks since he ended an extramarital affair that he had been engaged in for so time now . She reports that he confessed to her , their children and the rest of his family however even though they have all informed him that they have forgiven him , he is still having alot of trouble coping with his actions. " Patient was offered a voluntary inpatient psychiatric hospitalization for medication management and symptom stabilization. he verbalized understanding and was agreeable"  Upon [facility], pt appeared very drowsy and was nodding off several times during interview. As a result, therapist was not able to obtain much information and interview was brief. Therapist used session to review paperwork, and clinic policies and complete a risk assessment.  Pt denied any SI, both passive and active. Pt has a safety plan from [facility]. Pt reported that the current medication regimen, which he takes in the morning, is very sedating and he was advised to speak to the prescribing psychiatrist from [facility]. Therapist reiterated  this with pt's wife who was in waiting room, who was concerned for his drowsiness as well.   [FreeTextEntry2] : no PMH [FreeTextEntry3] : none

## 2024-09-04 NOTE — PSYCHOSOCIAL ASSESSMENT
[None known] : None known [HS Diploma or GED] : HS Diploma or GED [tried hard not to think about the event(s) or went out of your way to avoid situations that reminded you of the event] : tried hard to avoid thinking about events or avoid situations that reminded patient of the event [Other employment] : other employment [Retired] : retired [Financially stable] : financially stable [None] : none [Private residence (home, apartment, rooming house, hotel, motel, supported housing, supported Single Room Occupancy (SRO),] : Private residence (home, apartment, rooming house, hotel, motel, supported housing, supported Single Room Occupancy (SRO), permanent housing programs, transient housing programs, and shelter plus care housing) [Client's spouse or domestic partner] : client's spouse or domestic partner [Yes] : yes [N/A] : n/a [Spouse/Partner] : spouse/partner [Good] : good [Lives with Family Member] : lives with family member [No] : Prior or current active [place]  service? No [FreeTextEntry3] : Pt gets along well with his peers, has many friends from work and his childhood.  [FreeTextEntry1] : none [had nightmares about the event(s) or thought about the event(s) when you did not want] : did not have nightmares and/or unwanted thoughts about the events [has been constantly on guard, watchful, or easily startled] : has not been constantly on guard, watchful, or easily startled [felt numb or detached from people, activities, or your surrounding] : has not felt numb or detached from people, activities, or surroundings [felt guilty or unable to stop blaming yourself or others for the event(s) or any problems the event(s) may have caused] : has not felt guilty or unable to stop blaming self or others for event(s), or any problems the event(s) may have caused [FreeTextEntry7] : Kianna Leaks

## 2024-09-04 NOTE — SOCIAL HISTORY
[FreeTextEntry1] : Social History   Employment history - [employer] for 31 yrs, pt retired 2019.   Developmental history - none Sexual hx/identity Sexual History/ Concern (include sexual orientation and other relevant information) - none   Social supports (AA, etc.)  ?   Spiritual Assessment Tool - SHERRY WILDE What is your aniya or belief? Pentacostal  Do you consider yourself spiritual or Mosque? Mosque Is there something you believe in that gives meaning to your life? wife and kids, family I: Is it important in your life? yes What influence does it have on how you take care of yourself? I must work and get proper amount of rest  How have your beliefs influenced your behavior during this illness? What role do your beliefs play in regaining your health? none C. Are you part of a spiritual or Mosque community? yes Is this of support to you and how? you can speak to the  if you need help.   Is there a person or group of people you really love or who are really important to you? [ ] family and friends  H. We have been discussing your belief and supports. What else gives you internal support? going out, walking, doing things  What are your sources of hope, strength, comfort and peace? family What do you hold on to during difficult times? [ ] family what sustains you and keeps you going? [ ] family A. How would you like me, your healthcare provider, to address these issues in your healthcare? [ ] unknown  BRIEF TOBACCO CESSATION INTERVENTION  Do you Smoke? no  Do you want to quit?   -ASK  Number of cigarettes _____ cigars _____ pipe bowls _____ per day  Number of cartridges per week ______  Number of years used ______  How soon after you wake up do you vape? ?  Previous quit attempts: # of attempts ___ longest quit period _1 month__ methods(s) used __Chew gum________________  How long ago was last attempts to quit __year_____ years __________ months  Reasons for wanting to quit ________________________  -ADVISE about the oral benefits of quitting  -ASSESS willingness to make a quit attempts (Stage of Change)  -Precontemplation (Stop here & Reassess next visit) Contemplation ?Preparation  -ASSIST (depending on stage of change)  Self-Help Pamphlets & Materials  List of local community group/ individuals quit programs and phone help lines  Encourage a quite date (for those who are ready)  Pharmacotherapy: Nicotine gum/ Lozenge/ Patch/ Inhaler/NS/Zyban/ Chantix  RX: ()  -ARRANGE Follow up if set a quit date (with permission)  Quite Date: __________________________ Phone calls or visits: ?Week 1-2 Months ? 1 ? 3 ? 6 ?12  -Yearly Reassessment __ No Changes of Smoking __Change of Smoking Habit (Smoker-Non-Smoker/ Smoker to Non Smoker)  (If there is change from Non Smoker to Smoker, please fill out new BRIEF TOBACCO CESSATION INTERVENTION FORM)  Date of Yearly Reassessment : ___________________ Date of Yearly Reassessment : ___________________   If the patient agreed on working on any positive screening on assessment, you should include this problem in your goals.

## 2024-09-04 NOTE — HISTORY OF PRESENT ILLNESS
[Not Applicable] : Not applicable [Suicidal Behavior/Ideation] : suicidal behavior/ideation [FreeTextEntry1] : HPI (Include Illness Quality, Severity, Duration, Timing, Context, Modifying Factors, Associated Signs and Symptoms)	Mr Pizarro is a 59 year old Black man, resides with his wife and 2 sons ( 21 and 29 years old ) retired supervisor at [employer] , on a pension, with a PMH of HTN and recent chronic lacunar infarct with no residual deficits diagnosed in July 2024, and PPH of recent dx of depression who was admitted to the medical floor for the evaluation of Altered mental status. Medical workup was negative. According to the medical team, patient has significant marital stressors and was diagnosed with Depression on 7/15, was placed on Lexapro but has not been taking it . psychiatry consult was called for the evaluation of depressed mood and possible suicidal ideations.      During evaluation in ED, Patient reports that he remembers telling staff that he was having thoughts of ending his life,  however denies currently having such thoughts . he reports that he has been going though a lot of issues lately and has had to deal with the consequences of his actions and the way it has affected his marriage and his family, patient reports that he engaged in behaviors that he has no business engaging in  and this has made him very depressed , hopeless and helpless and became  disappointed in himself . he reports that he was prescribed a medication called Lexapro by a psychiatrist he saw a few weeks ago however he did not take it . Patient denies acute symptoms of psychosis or alexandro . He also denies current use of illicit drugs or alcohol.  Collateral information was obtained from patient's wife Jaylin ( [phone] ) .  She reports that he had been depressed and anxious  about a week a go , had brought him to the emergency room where he was seen then  but was cleared and referred for outpatient psychiatry services . She reports that patient continued to report being depressed causing her to take him to see the psychiatrist at [facility]  who wrote him a prescription of Lexapro 5mg for anxiety . She confirmed that patient did not take the medication. She reports that it was at the emergency room this time around that he seemed to have told the staff that he was having suicidal thoughts which he later confirmed to her .  She reports that in her opinion, the suicidal thoughts are secondary to the feeling of shame and guilt he has been feelings for the past 6 weeks since he ended an extramarital affair that he had been engaged in for so time now . She reports that he confessed to her , their children and the rest of his family however even though they have all informed him that they have forgiven him , he is still having alot of trouble coping with his actions. " Patient was offered a voluntary inpatient psychiatric hospitalization for medication management and symptom stabilization. he verbalized understanding and was agreeable"  Upon intake today at [facility], pt appeared very drowsy and was nodding off several times during interview. As a result, therapist was not able to obtain much information and interview was brief. Therapist used session to review paperwork, and clinic policies and complete a risk assessment.  Pt denied any SI, both passive and active. Pt has a safety plan from [facility]. Pt reported that the current medication regimen, which he takes in the morning, is very sedating and he was advised to speak to the prescribing psychiatrist from [facility]. Therapist reiterated  this with pt's wife who was in waiting room, who was concerned for his drowsiness as well.   [FreeTextEntry2] : no PMH [FreeTextEntry3] : none

## 2024-09-10 ENCOUNTER — OUTPATIENT (OUTPATIENT)
Dept: OUTPATIENT SERVICES | Facility: HOSPITAL | Age: 59
LOS: 1 days | End: 2024-09-10
Payer: COMMERCIAL

## 2024-09-10 ENCOUNTER — APPOINTMENT (OUTPATIENT)
Dept: PSYCHIATRY | Facility: CLINIC | Age: 59
End: 2024-09-10

## 2024-09-10 DIAGNOSIS — F32.3 MAJOR DEPRESSIVE DISORDER, SINGLE EPISODE, SEVERE WITH PSYCHOTIC FEATURES: ICD-10-CM

## 2024-09-10 DIAGNOSIS — F32.2 MAJOR DEPRESSIVE DISORDER, SINGLE EPISODE, SEVERE W/OUT PSYCHOTIC FEATURES: ICD-10-CM

## 2024-09-10 PROCEDURE — 90832 PSYTX W PT 30 MINUTES: CPT

## 2024-09-10 NOTE — PLAN
[Other: ____] : [unfilled] [FreeTextEntry2] : TBD [de-identified] : This was the 2nd intake assessment appt for pt and therapist. Therapist completed the remaining portions of the assessment. Pt appeared brighter, was not sleepy this week and stayed awake throughout entire session./ Pt reported that he started taking the medication earlier and the effects have worn off already. Pt and therapist collaborated on pt's safety plan as he did express passive SI while hospitalized. Pt was able to identify coping tools as well as several supports that he could turn to when in crisis.  Pt denies any current SI and is reports improved mood and less anxiety. Pt is not interested in therapy at this time Therapist  will consult with pt's psychiatrist Dr Chua, following his psychiatric evaluation on 9/12 for his recommendation . Pt was not given a follow up at this time for therapy .  [FreeTextEntry1] : Pt will contact tx team for worsening of symptoms and/or problems with medication. Next appt: none needed at this time

## 2024-09-10 NOTE — PHYSICAL EXAM
[Cooperative] : cooperative [Euthymic] : euthymic [Constricted] : constricted [Clear] : clear [Linear/Goal Directed] : linear/goal directed [None] : none [None Reported] : none reported [Average] : average [WNL] : within normal limits

## 2024-09-11 DIAGNOSIS — F32.2 MAJOR DEPRESSIVE DISORDER, SINGLE EPISODE, SEVERE WITHOUT PSYCHOTIC FEATURES: ICD-10-CM

## 2024-09-12 ENCOUNTER — APPOINTMENT (OUTPATIENT)
Dept: PSYCHIATRY | Facility: CLINIC | Age: 59
End: 2024-09-12

## 2024-09-12 ENCOUNTER — OUTPATIENT (OUTPATIENT)
Dept: OUTPATIENT SERVICES | Facility: HOSPITAL | Age: 59
LOS: 1 days | End: 2024-09-12
Payer: COMMERCIAL

## 2024-09-12 DIAGNOSIS — F32.3 MAJOR DEPRESSIVE DISORDER, SINGLE EPISODE, SEVERE WITH PSYCHOTIC FEATURES: ICD-10-CM

## 2024-09-12 DIAGNOSIS — F32.A DEPRESSION, UNSPECIFIED: ICD-10-CM

## 2024-09-12 PROCEDURE — 90792 PSYCH DIAG EVAL W/MED SRVCS: CPT

## 2024-09-12 RX ORDER — ESCITALOPRAM OXALATE 20 MG/1
20 TABLET ORAL DAILY
Qty: 30 | Refills: 1 | Status: ACTIVE | COMMUNITY
Start: 2024-09-12 | End: 1900-01-01

## 2024-09-12 RX ORDER — OLANZAPINE 10 MG/1
10 TABLET, FILM COATED ORAL AT BEDTIME
Qty: 30 | Refills: 1 | Status: ACTIVE | COMMUNITY
Start: 2024-09-12 | End: 1900-01-01

## 2024-09-12 NOTE — RISK ASSESSMENT
[Clinical Records] : Clinical Records [In last 30 days] : in the last 30 days [No] : No [No known suicide factors] : No known suicide factors No

## 2024-09-13 DIAGNOSIS — F32.A DEPRESSION, UNSPECIFIED: ICD-10-CM
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
## 2024-09-19 NOTE — PHYSICAL EXAM
[Cooperative] : cooperative [Constricted] : constricted [Clear] : clear [Linear/Goal Directed] : linear/goal directed [Average] : average [WNL] : within normal limits [FreeTextEntry8] : "good"

## 2024-09-19 NOTE — PLAN
[FreeTextEntry4] : #MDD with psychotic features - c/w zyprexa 10mg qhs (pt was taking in morning and had drowsiness in daytime, told to take at bedtime instead) - c/w lexapro 20mg qdaily - f/u 1 month or sooner if needed
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
## 2024-10-11 ENCOUNTER — APPOINTMENT (OUTPATIENT)
Dept: PSYCHIATRY | Facility: CLINIC | Age: 59
End: 2024-10-11
Payer: COMMERCIAL

## 2024-10-11 ENCOUNTER — OUTPATIENT (OUTPATIENT)
Dept: OUTPATIENT SERVICES | Facility: HOSPITAL | Age: 59
LOS: 1 days | End: 2024-10-11
Payer: COMMERCIAL

## 2024-10-11 DIAGNOSIS — F32.3 MAJOR DEPRESSIVE DISORDER, SINGLE EPISODE, SEVERE WITH PSYCHOTIC FEATURES: ICD-10-CM

## 2024-10-11 PROCEDURE — 99214 OFFICE O/P EST MOD 30 MIN: CPT

## 2024-10-11 PROCEDURE — ZZZZZ: CPT

## 2024-10-12 DIAGNOSIS — F32.3 MAJOR DEPRESSIVE DISORDER, SINGLE EPISODE, SEVERE WITH PSYCHOTIC FEATURES: ICD-10-CM

## 2024-11-08 ENCOUNTER — OUTPATIENT (OUTPATIENT)
Dept: OUTPATIENT SERVICES | Facility: HOSPITAL | Age: 59
LOS: 1 days | End: 2024-11-08
Payer: COMMERCIAL

## 2024-11-08 ENCOUNTER — APPOINTMENT (OUTPATIENT)
Dept: PSYCHIATRY | Facility: CLINIC | Age: 59
End: 2024-11-08
Payer: COMMERCIAL

## 2024-11-08 DIAGNOSIS — F32.3 MAJOR DEPRESSIVE DISORDER, SINGLE EPISODE, SEVERE WITH PSYCHOTIC FEATURES: ICD-10-CM

## 2024-11-08 PROCEDURE — ZZZZZ: CPT

## 2024-11-08 PROCEDURE — 99214 OFFICE O/P EST MOD 30 MIN: CPT

## 2024-11-09 DIAGNOSIS — F32.3 MAJOR DEPRESSIVE DISORDER, SINGLE EPISODE, SEVERE WITH PSYCHOTIC FEATURES: ICD-10-CM

## 2024-12-27 ENCOUNTER — APPOINTMENT (OUTPATIENT)
Dept: PSYCHIATRY | Facility: CLINIC | Age: 59
End: 2024-12-27

## 2024-12-27 ENCOUNTER — OUTPATIENT (OUTPATIENT)
Dept: OUTPATIENT SERVICES | Facility: HOSPITAL | Age: 59
LOS: 1 days | End: 2024-12-27
Payer: COMMERCIAL

## 2024-12-27 DIAGNOSIS — F32.3 MAJOR DEPRESSIVE DISORDER, SINGLE EPISODE, SEVERE WITH PSYCHOTIC FEATURES: ICD-10-CM

## 2024-12-27 PROCEDURE — ZZZZZ: CPT

## 2024-12-27 PROCEDURE — 99213 OFFICE O/P EST LOW 20 MIN: CPT

## 2024-12-28 DIAGNOSIS — F32.3 MAJOR DEPRESSIVE DISORDER, SINGLE EPISODE, SEVERE WITH PSYCHOTIC FEATURES: ICD-10-CM

## 2025-02-14 ENCOUNTER — APPOINTMENT (OUTPATIENT)
Dept: PSYCHIATRY | Facility: CLINIC | Age: 60
End: 2025-02-14
Payer: COMMERCIAL

## 2025-02-14 ENCOUNTER — OUTPATIENT (OUTPATIENT)
Dept: OUTPATIENT SERVICES | Facility: HOSPITAL | Age: 60
LOS: 1 days | End: 2025-02-14
Payer: COMMERCIAL

## 2025-02-14 DIAGNOSIS — F32.3 MAJOR DEPRESSIVE DISORDER, SINGLE EPISODE, SEVERE WITH PSYCHOTIC FEATURES: ICD-10-CM

## 2025-02-14 PROCEDURE — ZZZZZ: CPT

## 2025-02-14 PROCEDURE — 99213 OFFICE O/P EST LOW 20 MIN: CPT

## 2025-02-15 DIAGNOSIS — F32.3 MAJOR DEPRESSIVE DISORDER, SINGLE EPISODE, SEVERE WITH PSYCHOTIC FEATURES: ICD-10-CM

## 2025-04-01 NOTE — BH DISCHARGE NOTE NURSING/SOCIAL WORK/PSYCH REHAB - [id number]
DVT ultrasound negative for acute process.  No other acute emergent causes for thigh pain suspected.  Follow-up with primary care in 1 to 2 weeks if symptoms are not resolved.    
27-Aug-2024 10:00

## 2025-04-11 ENCOUNTER — APPOINTMENT (OUTPATIENT)
Dept: PSYCHIATRY | Facility: CLINIC | Age: 60
End: 2025-04-11
Payer: COMMERCIAL

## 2025-04-11 ENCOUNTER — OUTPATIENT (OUTPATIENT)
Dept: OUTPATIENT SERVICES | Facility: HOSPITAL | Age: 60
LOS: 1 days | End: 2025-04-11
Payer: COMMERCIAL

## 2025-04-11 DIAGNOSIS — F32.2 MAJOR DEPRESSIVE DISORDER, SINGLE EPISODE, SEVERE W/OUT PSYCHOTIC FEATURES: ICD-10-CM

## 2025-04-11 DIAGNOSIS — F32.3 MAJOR DEPRESSIVE DISORDER, SINGLE EPISODE, SEVERE WITH PSYCHOTIC FEATURES: ICD-10-CM

## 2025-04-11 PROCEDURE — ZZZZZ: CPT

## 2025-04-11 PROCEDURE — 98006 SYNCH AUDIO-VIDEO EST MOD 30: CPT

## 2025-04-12 DIAGNOSIS — F32.3 MAJOR DEPRESSIVE DISORDER, SINGLE EPISODE, SEVERE WITH PSYCHOTIC FEATURES: ICD-10-CM

## 2025-06-03 ENCOUNTER — APPOINTMENT (OUTPATIENT)
Dept: PSYCHIATRY | Facility: CLINIC | Age: 60
End: 2025-06-03

## 2025-06-03 ENCOUNTER — OUTPATIENT (OUTPATIENT)
Dept: OUTPATIENT SERVICES | Facility: HOSPITAL | Age: 60
LOS: 1 days | End: 2025-06-03
Payer: COMMERCIAL

## 2025-06-03 DIAGNOSIS — F32.3 MAJOR DEPRESSIVE DISORDER, SINGLE EPISODE, SEVERE WITH PSYCHOTIC FEATURES: ICD-10-CM

## 2025-06-03 PROCEDURE — ZZZZZ: CPT

## 2025-06-03 PROCEDURE — 98007 SYNCH AUDIO-VIDEO EST HI 40: CPT

## 2025-06-04 DIAGNOSIS — F32.3 MAJOR DEPRESSIVE DISORDER, SINGLE EPISODE, SEVERE WITH PSYCHOTIC FEATURES: ICD-10-CM

## 2025-07-29 ENCOUNTER — OUTPATIENT (OUTPATIENT)
Dept: OUTPATIENT SERVICES | Facility: HOSPITAL | Age: 60
LOS: 1 days | End: 2025-07-29
Payer: COMMERCIAL

## 2025-07-29 ENCOUNTER — APPOINTMENT (OUTPATIENT)
Dept: PSYCHIATRY | Facility: CLINIC | Age: 60
End: 2025-07-29

## 2025-07-29 DIAGNOSIS — F32.3 MAJOR DEPRESSIVE DISORDER, SINGLE EPISODE, SEVERE WITH PSYCHOTIC FEATURES: ICD-10-CM

## 2025-07-29 PROCEDURE — 98006 SYNCH AUDIO-VIDEO EST MOD 30: CPT

## 2025-07-29 PROCEDURE — ZZZZZ: CPT

## 2025-07-30 DIAGNOSIS — F32.3 MAJOR DEPRESSIVE DISORDER, SINGLE EPISODE, SEVERE WITH PSYCHOTIC FEATURES: ICD-10-CM
